# Patient Record
Sex: MALE | Race: AMERICAN INDIAN OR ALASKA NATIVE
[De-identification: names, ages, dates, MRNs, and addresses within clinical notes are randomized per-mention and may not be internally consistent; named-entity substitution may affect disease eponyms.]

---

## 2017-04-10 ENCOUNTER — HOSPITAL ENCOUNTER (EMERGENCY)
Dept: HOSPITAL 31 - C.ER | Age: 67
Discharge: HOME | End: 2017-04-10
Payer: COMMERCIAL

## 2017-04-10 VITALS — OXYGEN SATURATION: 100 %

## 2017-04-10 VITALS — BODY MASS INDEX: 25.9 KG/M2

## 2017-04-10 VITALS — SYSTOLIC BLOOD PRESSURE: 148 MMHG | HEART RATE: 76 BPM | RESPIRATION RATE: 18 BRPM | DIASTOLIC BLOOD PRESSURE: 88 MMHG

## 2017-04-10 VITALS — TEMPERATURE: 98.1 F

## 2017-04-10 DIAGNOSIS — R05: Primary | ICD-10-CM

## 2017-04-10 NOTE — C.PDOC
History Of Present Illness


66 year old male presents to the ED with complaints of a dry non-productive 

cough for the past week. Patient states he was seen by his PMD and was told it 

was likely viral. Denies fever, chills, chest pain, SOB, or any other 

complaints at this time.


Time Seen by Provider: 04/10/17 15:27


Chief Complaint (Nursing): Cough, Cold, Congestion


History Per: Patient


History/Exam Limitations: no limitations


Onset/Duration Of Symptoms: Days


Current Symptoms Are (Timing): Still Present


Severity: Mild


Associated Symptoms: denies: Fever, Chills, Chest Pain, Productive Cough





Past Medical History


Reviewed: Historical Data, Nursing Documentation, Vital Signs


Vital Signs: 


 Last Vital Signs











Temp  98.1 F   04/10/17 13:34


 


Pulse  76   04/10/17 15:58


 


Resp  18   04/10/17 15:58


 


BP  148/88   04/10/17 15:58


 


Pulse Ox  99   04/10/17 15:58














- Medical History


PMH: Benign Prostatic Hyperplasia, Cardia Arrhythmia (Ventricular fibrillations 

x 2 in the past with acute MI's.), CHF, HTN, Hypercholesterolemia, 

Hyperlipidemia, Kidney Stones, Pneumonia, Chronic Kidney Disease


Surgical History: Coronary Stent (X4)





- [facility] Procedures








CARDIOPULM RESUSCITA NOS (10/14/13)


CONTINUOUS INVASIVE MECHANICAL VENTILATION <96 CONSEC HRS (10/14/13)


CONTROL POSTOP PROST HEM (10/14/13)


CORONAR ARTERIOGR-2 CATH (10/14/13)


DESTRUCTION OF PROSTATE, ENDO (02/21/16)


DILATION OF BLADDER WITH INTRALUMINAL DEVICE, ENDO (02/21/16)


DILATION OF L KIDNEY PELVIS WITH INTRALUM DEV, PERC APPROACH (02/21/16)


DIR ING HERNIA REP-GRAFT (07/19/01)


DRAINAGE OF BLADDER WITH DRAINAGE DEVICE, OPEN APPROACH (02/21/16)


ESOPHAGOGASTRODUODENOSCOPY [EGD] W/CLOSED BIOPSY (01/23/02)


EXC LES SOFT TISSUE NEC (07/19/01)


EXTIRPATION OF MATTER FROM BLADDER, ENDO (02/21/16)


EXTIRPATION OF MATTER FROM LEFT URETER, ENDO (02/21/16)


FLUOROSCOPY OF LEFT HEART USING LOW OSMOLAR CONTRAST (04/10/16)


INSERT ENDOTRACHEAL TUBE (10/14/13)


INSERTION OF ENDOTRACHEAL AIRWAY INTO TRACHEA, VIA OPENING (02/21/16)


INSERTION OF ONE VASCULAR STENT (10/14/13)


INSPECTION OF LARYNX, ENDO (02/21/16)


INSPECTION OF PERITONEAL CAVITY, OPEN APPROACH (02/21/16)


INSPECTION OF TRACHEOBRONCHIAL TREE, ENDO (02/21/16)


INTRODUCTION OF NUTRITIONAL INTO UP GI, VIA OPENING (02/21/16)


INTRODUCTION OF VASOPRESSOR INTO PERIPH VEIN, PERC APPROACH (02/21/16)


LEFT HEART CARDIAC CATH (10/14/13)


LT HEART ANGIOCARDIOGRAM (10/14/13)


MEASURE OF CARDIAC SAMPL & PRESSURE, L HEART, PERC APPROACH (04/10/16)


PACKED CELL TRANSFUSION (10/14/13)


PERCUTANEOUS TRANSLUMINAL CORONARY ANGIOPLASTY [PTCA] (10/14/13)


PROCEDURE ON SINGLE VESSEL (10/14/13)


REPAIR BLADDER, OPEN APPROACH (02/21/16)


RESPIRATORY VENTILATION, GREATER THAN 96 CONSECUTIVE HOURS (02/21/16)


RETROGR CYSTOURETHROGRAM (10/14/13)


TRANSFUSE NONAUT FROZEN PLASMA IN PERIPH VEIN, PERC (02/21/16)


TRANSFUSE NONAUT RED BLOOD CELLS IN PERIPH VEIN, PERC (02/21/16)


TRANSURETH BLADD BIOPSY (10/14/13)


TU DESTRUC BLADD LES NEC (10/14/13)








Family History: States: Unknown Family Hx





- Social History


Hx Tobacco Use: No


Hx Alcohol Use: No


Hx Substance Use: No





- Immunization History


Hx Tetanus Toxoid Vaccination: No


Hx Influenza Vaccination: Yes


Hx Pneumococcal Vaccination: No





Review Of Systems


Except As Marked, All Systems Reviewed And Found Negative.


Constitutional: Negative for: Fever, Chills


Cardiovascular: Negative for: Chest Pain, Palpitations


Respiratory: Positive for: Cough.  Negative for: Shortness of Breath, Sputum





Physical Exam





- Physical Exam


Appears: Non-toxic, No Acute Distress


Skin: Normal Color, Warm, Dry


Head: Atraumatic, Normacephalic


Eye(s): bilateral: Normal Inspection


Oral Mucosa: Moist


Neck: Supple


Chest: Symmetrical, No Deformity


Cardiovascular: Rhythm Regular


Respiratory: Normal Breath Sounds, No Accessory Muscle Use, No Rales, No Rhonchi

, No Wheezing


Extremity: Normal ROM, No Pedal Edema


Neurological/Psych: Oriented x3, Normal Speech, Normal Cognition





ED Course And Treatment


O2 Sat by Pulse Oximetry: 100 (Room air)


Pulse Ox Interpretation: Normal


Progress Note: Patient treated with Robitussin.





Medical Decision Making


Medical Decision Making: 


dry non-prod cough chronic, clear lungs


otherwise normal exam


defer w/u w informed consent.





Disposition


Doctor Will See Patient In The: Office


Counseled Patient/Family Regarding: Studies Performed, Diagnosis





- Disposition


Referrals: 


Kiley Burnham MD [Staff Provider] - 


Disposition: HOME/ ROUTINE


Disposition Time: 15:45


Condition: GOOD


Additional Instructions: 


continue OTC cold medicines, as directed


Follow-up with Dr. DONIS as needed. 





- Clinical Impression


Clinical Impression: 


 Cough





- Scribe Statement


The provider has reviewed the documentation as recorded by the Scribe


Benitez Carney.





Provider Attestation: 





All medical record entries made by the Scribe were at my direction and 

personally dictated by me. I have reviewed the chart and agree that the record 

accurately reflects my personal performance of the history, physical exam, 

medical decision making, and the department course for this patient. I have 

also personally directed, reviewed, and agree with the discharge instructions 

and disposition.

## 2017-11-09 ENCOUNTER — HOSPITAL ENCOUNTER (INPATIENT)
Dept: HOSPITAL 31 - C.ER | Age: 67
LOS: 4 days | Discharge: HOME | DRG: 812 | End: 2017-11-13
Attending: LEGAL MEDICINE | Admitting: LEGAL MEDICINE
Payer: MEDICARE

## 2017-11-09 VITALS — BODY MASS INDEX: 25.9 KG/M2

## 2017-11-09 DIAGNOSIS — Z87.01: ICD-10-CM

## 2017-11-09 DIAGNOSIS — K29.50: ICD-10-CM

## 2017-11-09 DIAGNOSIS — F17.200: ICD-10-CM

## 2017-11-09 DIAGNOSIS — E11.22: ICD-10-CM

## 2017-11-09 DIAGNOSIS — K59.00: ICD-10-CM

## 2017-11-09 DIAGNOSIS — I25.2: ICD-10-CM

## 2017-11-09 DIAGNOSIS — I25.10: ICD-10-CM

## 2017-11-09 DIAGNOSIS — H40.9: ICD-10-CM

## 2017-11-09 DIAGNOSIS — Z79.899: ICD-10-CM

## 2017-11-09 DIAGNOSIS — N18.2: ICD-10-CM

## 2017-11-09 DIAGNOSIS — D62: Primary | ICD-10-CM

## 2017-11-09 DIAGNOSIS — K92.1: ICD-10-CM

## 2017-11-09 DIAGNOSIS — N32.89: ICD-10-CM

## 2017-11-09 DIAGNOSIS — Z95.5: ICD-10-CM

## 2017-11-09 DIAGNOSIS — I25.5: ICD-10-CM

## 2017-11-09 DIAGNOSIS — I50.9: ICD-10-CM

## 2017-11-09 DIAGNOSIS — Z87.442: ICD-10-CM

## 2017-11-09 DIAGNOSIS — K64.8: ICD-10-CM

## 2017-11-09 DIAGNOSIS — N40.0: ICD-10-CM

## 2017-11-09 DIAGNOSIS — Z87.11: ICD-10-CM

## 2017-11-09 DIAGNOSIS — E78.00: ICD-10-CM

## 2017-11-09 DIAGNOSIS — K44.9: ICD-10-CM

## 2017-11-09 DIAGNOSIS — K21.9: ICD-10-CM

## 2017-11-09 DIAGNOSIS — E78.5: ICD-10-CM

## 2017-11-09 DIAGNOSIS — N17.9: ICD-10-CM

## 2017-11-09 DIAGNOSIS — H91.90: ICD-10-CM

## 2017-11-09 DIAGNOSIS — I49.9: ICD-10-CM

## 2017-11-09 DIAGNOSIS — K57.30: ICD-10-CM

## 2017-11-09 DIAGNOSIS — J44.9: ICD-10-CM

## 2017-11-09 DIAGNOSIS — K29.00: ICD-10-CM

## 2017-11-09 DIAGNOSIS — I13.0: ICD-10-CM

## 2017-11-09 LAB
ALBUMIN/GLOB SERPL: 1.3 {RATIO} (ref 1–2.1)
ALP SERPL-CCNC: 47 U/L (ref 38–126)
ALT SERPL-CCNC: 27 U/L (ref 21–72)
APTT BLD: 31 SECONDS (ref 21–34)
AST SERPL-CCNC: 24 U/L (ref 17–59)
BACTERIA #/AREA URNS HPF: (no result) /[HPF]
BASOPHILS # BLD AUTO: 0.1 K/UL (ref 0–0.2)
BASOPHILS NFR BLD: 1.3 % (ref 0–2)
BILIRUB SERPL-MCNC: 0.6 MG/DL (ref 0.2–1.3)
BILIRUB UR-MCNC: NEGATIVE MG/DL
BUN SERPL-MCNC: 19 MG/DL (ref 9–20)
CALCIUM SERPL-MCNC: 8.2 MG/DL (ref 8.6–10.4)
CHLORIDE SERPL-SCNC: 104 MMOL/L (ref 98–107)
CO2 SERPL-SCNC: 22 MMOL/L (ref 22–30)
EOSINOPHIL # BLD AUTO: 0.1 K/UL (ref 0–0.7)
EOSINOPHIL NFR BLD: 1.1 % (ref 0–4)
ERYTHROCYTE [DISTWIDTH] IN BLOOD BY AUTOMATED COUNT: 19.6 % (ref 11.5–14.5)
GLOBULIN SER-MCNC: 2.8 GM/DL (ref 2.2–3.9)
GLUCOSE SERPL-MCNC: 101 MG/DL (ref 75–110)
GLUCOSE UR STRIP-MCNC: NORMAL MG/DL
HCT VFR BLD CALC: 17.2 % (ref 35–51)
INR PPP: 1.1
KETONES UR STRIP-MCNC: NEGATIVE MG/DL
LEUKOCYTE ESTERASE UR-ACNC: (no result) LEU/UL
LYMPHOCYTES # BLD AUTO: 0.9 K/UL (ref 1–4.3)
LYMPHOCYTES NFR BLD AUTO: 16.4 % (ref 20–40)
MCH RBC QN AUTO: 17.2 PG (ref 27–31)
MCHC RBC AUTO-ENTMCNC: 28.6 G/DL (ref 33–37)
MCV RBC AUTO: 60.3 FL (ref 80–94)
MONOCYTES # BLD: 0.7 K/UL (ref 0–0.8)
MONOCYTES NFR BLD: 12.2 % (ref 0–10)
NRBC BLD AUTO-RTO: 0.2 % (ref 0–2)
PH UR STRIP: 8 [PH] (ref 5–8)
PLATELET # BLD: 284 K/UL (ref 130–400)
PMV BLD AUTO: 8.5 FL (ref 7.2–11.7)
POTASSIUM SERPL-SCNC: 4.3 MMOL/L (ref 3.6–5.2)
PROT SERPL-MCNC: 6.5 G/DL (ref 6.3–8.3)
PROT UR STRIP-MCNC: (no result) MG/DL
RBC # UR STRIP: (no result) /UL
RBC #/AREA URNS HPF: 2 /HPF (ref 0–3)
SODIUM SERPL-SCNC: 136 MMOL/L (ref 132–148)
SP GR UR STRIP: 1.01 (ref 1–1.03)
TRI-PHOS CRY #/AREA URNS HPF: (no result) /HPF
UROBILINOGEN UR-MCNC: NORMAL MG/DL (ref 0.2–1)
WBC # BLD AUTO: 5.6 K/UL (ref 4.8–10.8)
WBC #/AREA URNS HPF: 62 /HPF (ref 0–5)

## 2017-11-09 PROCEDURE — 30233N1 TRANSFUSION OF NONAUTOLOGOUS RED BLOOD CELLS INTO PERIPHERAL VEIN, PERCUTANEOUS APPROACH: ICD-10-PCS | Performed by: LEGAL MEDICINE

## 2017-11-09 RX ADMIN — PANTOPRAZOLE SODIUM SCH MLS/HR: 40 INJECTION, POWDER, FOR SOLUTION INTRAVENOUS at 21:32

## 2017-11-09 RX ADMIN — Medication SCH: at 19:53

## 2017-11-09 NOTE — C.PDOC
History Of Present Illness


65 y/o male presents to ED sent by PMD for low hemoglobin. Patient states he 

had epigastric abdominal pain and went to see Dr. Burnham where lab work was 

done and showed a hemoglobin of 5. PMD advised patient come to ED for further 

evaluation.Patient reports he has been constipated and denies fever, chills, 

dizziness, nausea or any other complaints at this time.    


Time Seen by Provider: 17 10:47


Chief Complaint (Nursing): Abdominal Pain


History Per: Patient


History/Exam Limitations: no limitations


Onset/Duration Of Symptoms: Days


Current Symptoms Are (Timing): Still Present


Location Of Pain/Discomfort: Epigastric





Past Medical History


Reviewed: Historical Data, Nursing Documentation, Vital Signs


Vital Signs: 


 Last Vital Signs











Temp  98.7 F   17 13:14


 


Pulse  79   17 13:14


 


Resp  15   17 13:14


 


BP  117/66   17 13:14


 


Pulse Ox  100   17 13:37














- Medical History


PMH: Benign Prostatic Hyperplasia, Cardia Arrhythmia (Ventricular fibrillations 

x 2 in the past with acute MI's.), CHF, HTN, Hypercholesterolemia, 

Hyperlipidemia, Kidney Stones, Pneumonia, Chronic Kidney Disease


Surgical History: Coronary Stent (X4)





- [facility] Procedures








CARDIOPULM RESUSCITA NOS (10/14/13)


CONTINUOUS INVASIVE MECHANICAL VENTILATION <96 CONSEC HRS (10/14/13)


CONTROL POSTOP PROST HEM (10/14/13)


CORONAR ARTERIOGR-2 CATH (10/14/13)


DESTRUCTION OF PROSTATE, ENDO (16)


DILATION OF BLADDER WITH INTRALUMINAL DEVICE, ENDO (16)


DILATION OF L KIDNEY PELVIS WITH INTRALUM DEV, PERC APPROACH (16)


DIR ING HERNIA REP-GRAFT (01)


DRAINAGE OF BLADDER WITH DRAINAGE DEVICE, OPEN APPROACH (16)


ESOPHAGOGASTRODUODENOSCOPY [EGD] W/CLOSED BIOPSY (02)


EXC LES SOFT TISSUE NEC (01)


EXTIRPATION OF MATTER FROM BLADDER, ENDO (16)


EXTIRPATION OF MATTER FROM LEFT URETER, ENDO (16)


FLUOROSCOPY OF LEFT HEART USING LOW OSMOLAR CONTRAST (04/10/16)


INSERT ENDOTRACHEAL TUBE (10/14/13)


INSERTION OF ENDOTRACHEAL AIRWAY INTO TRACHEA, VIA OPENING (16)


INSERTION OF ONE VASCULAR STENT (10/14/13)


INSPECTION OF LARYNX, ENDO (16)


INSPECTION OF PERITONEAL CAVITY, OPEN APPROACH (16)


INSPECTION OF TRACHEOBRONCHIAL TREE, ENDO (16)


INTRODUCTION OF NUTRITIONAL INTO UP GI, VIA OPENING (16)


INTRODUCTION OF VASOPRESSOR INTO PERIPH VEIN, PERC APPROACH (16)


LEFT HEART CARDIAC CATH (10/14/13)


LT HEART ANGIOCARDIOGRAM (10/14/13)


MEASURE OF CARDIAC SAMPL & PRESSURE, L HEART, PERC APPROACH (04/10/16)


PACKED CELL TRANSFUSION (10/14/13)


PERCUTANEOUS TRANSLUMINAL CORONARY ANGIOPLASTY [PTCA] (10/14/13)


PROCEDURE ON SINGLE VESSEL (10/14/13)


REPAIR BLADDER, OPEN APPROACH (16)


RESPIRATORY VENTILATION, GREATER THAN 96 CONSECUTIVE HOURS (16)


RETROGR CYSTOURETHROGRAM (10/14/13)


TRANSFUSE NONAUT FROZEN PLASMA IN PERIPH VEIN, PERC (16)


TRANSFUSE NONAUT RED BLOOD CELLS IN PERIPH VEIN, PERC (16)


TRANSURETH BLADD BIOPSY (10/14/13)


TU DESTRUC BLADD LES NEC (10/14/13)








Family History: States: No Known Family Hx





- Social History


Hx Tobacco Use: No


Hx Alcohol Use: No


Hx Substance Use: No





- Immunization History


Hx Tetanus Toxoid Vaccination: No


Hx Influenza Vaccination: No


Hx Pneumococcal Vaccination: No





Review Of Systems


Constitutional: Negative for: Fever, Chills


Gastrointestinal: Positive for: Abdominal Pain.  Negative for: Nausea, Vomiting


Skin: Negative for: Rash


Neurological: Negative for: Weakness, Numbness, Dizziness





Physical Exam





- Physical Exam


Appears: Non-toxic, No Acute Distress


Skin: Normal Color, Warm, Dry, No Rash


Head: Atraumatic, Normacephalic


Eye(s): bilateral: Normal Inspection


Oral Mucosa: Moist


Neck: Normal ROM, Supple


Cardiovascular: Rhythm Regular


Respiratory: Normal Breath Sounds, No Rales, No Rhonchi, No Wheezing


Gastrointestinal/Abdominal: Soft, Tenderness (Mild periumbilical ), No Guarding

, No Rebound


Rectal: Heme Negative (Stool brown, trace positive with guiac)


Back: No CVA Tenderness


Neurological/Psych: Oriented x3





ED Course And Treatment





- Laboratory Results


Result Diagrams: 


 17 11:25





 17 11:25


Lab Interpretation: Abnormal


ECG: Interpreted By Me


ECG Rhythm: Sinus Rhythm, Nonspecific Changes


ECG Interpretation: No Acute Changes


Rate From EC


O2 Sat by Pulse Oximetry: 100 (RA)


Pulse Ox Interpretation: Normal





- Other Rad


  ** No standard instances


X-Ray: Viewed By Me, Read By Radiologist


Interpretation: Findings:  Mild venous congestion.  Calcification at the aortic 

knob.  Tortuous aorta.  Mild cardiomegaly.  Degenerative changes in the spine 

and shoulders.  Few mildly distended loops of small bowel in the right and left 

vinny abdomen.  Mild fecal retention the colon.  Degenerative changes in the 

spine and bilateral iliac crests and hips.  Calcified phleboliths in pelvis.  

Impression:  Nonspecific bowel gas pattern with a few distended loops of bowel 

in the right and left mid abdomen. Correlation with CT scan may be helpful if 

abdominal pain persists. Clinical correlation.


Progress Note: Treated with IVF NSS @ 100 ml/hr.  2 units PC ordered.  Case 

discussed with Dr Aggarwal who request admission to Dr Dodge.  Admitted to 

telemetry in stable condition


Reassessment Condition: Unchanged





- Physician Consult Information


Physician Contacted: Kiley Burnham


Outcome Of Conversation: admit





Medical Decision Making


Medical Decision Making: 


Patient is on Brilinta for irregular heart rate, on blood thinners





Plan: Abdominal Xray, IV fluids, ECG, Blood work, UA 





Disposition


Discussed With Dr.: Kiley Burnham


Doctor Will See Patient In The: Hospital





- Disposition


Disposition: HOSPITALIZED


Disposition Time: 12:10


Condition: STABLE





- POA


Present On Arrival: None





- Clinical Impression


Clinical Impression: 


 Anemia, GI bleeding








- PA / NP / Resident Statement


MD/DO has reviewed & agrees with the documentation as recorded.





- Scribe Statement


The provider has reviewed the documentation as recorded by the Scribe


Zeeshan Blanc





All medical record entries made by the Rhiannaibbre were at my direction and 

personally dictated by me. I have reviewed the chart and agree that the record 

accurately reflects my personal performance of the history, physical exam, 

medical decision making, and the department course for this patient. I have 

also personally directed, reviewed, and agree with the discharge instructions 

and disposition.





Decision To Admit





- Pt Status Changed To:


Hospital Disposition Of: Inpatient





- Admit Certification


Admit to Inpatient:: After my assessment, the patient will require 

hospitalization for at least two midnights.  This is because of the severity of 

symptoms shown, intensity of services needed, and/or the medical risk in this 

patient being treated as an outpatient.





- InPatient:


Physician Admission Certification:: Anemia.  GI Bleeding





- .


Bed Request Type: Telemetry


Admitting Physician: Kiley Burnham


Patient Diagnosis: 


 Anemia, GI bleeding

## 2017-11-09 NOTE — RAD
Abdomen four views 



History: Abdominal pain. 



Comparison: None available. 



Findings: 



Mild venous congestion. 



Calcification at the aortic knob. 



Tortuous aorta. 



Mild cardiomegaly. 



Degenerative changes in the spine and shoulders. 



Few mildly distended loops of small bowel in the right and left vinny 

abdomen. 



Mild fecal retention the colon. 



Degenerative changes in the spine and bilateral iliac crests and 

hips. 



Calcified phleboliths in pelvis. 



Impression: 



Nonspecific bowel gas pattern with a few distended loops of bowel in 

the right and left mid abdomen. Correlation with CT scan may be 

helpful if abdominal pain persists. Clinical correlation.

## 2017-11-09 NOTE — CP.PCM.CON
History of Present Illness





- History of Present Illness


History of Present Illness: 





65 yo male complaining of vague abdominal discomfort, and nausea for 2 weeks. A 

CBC done in my office yesterday revealed a hgb: 5.0. Patient was advised 

hospitalization. Patient is known to have a CAD, s/p PCI of the RCA, an 

ischemic cardiomyopathy with a mild LV systolic dysfunction ( LVEF= 40%, mild MR

, TR on recent echocardiogram.), urinary bladder stones, a peptic ulcer 10 

years ago, on Pepcid, Brilinta, ASA, Crestor, Coreg, Losartan, he denies any 

loss of weight, but admits to have black, tarry stools for the past few days. 

He is receiving PRC transfusions. Brilinta and ASA are on hold, and the patient 

is scheduled for an EGD on Saturday.





Review of Systems





- Constitutional


Constitutional: Weakness





- Cardiovascular


Cardiovascular: Lightheadedness





- Gastrointestinal


Gastrointestinal: Melena, Nausea, Vomiting





Past Patient History





- Infectious Disease


Hx of Infectious Diseases: None





- Tetanus Immunizations


Tetanus Immunization: Unknown





- Past Medical History & Family History


Past Medical History?: Yes





- Past Social History


Smoking Status: Smoked extensively [place] quit after stent placement


Occupation: worked in [employer]


Alcohol: None


Drugs: Denies


Home Situation {Lives}: With Family





- CARDIAC


Hx Cardiac Disorders: Yes


Hx Cardia Arrhythmia: Yes (Ventricular fibrillations x 2 in the past with acute 

MI's.)


Hx Congestive Heart Failure: Yes


Hx Heart Attack: Yes


Hx Hypercholesterolemia: Yes


Hx Hypertension: Yes





- PULMONARY


Hx Respiratory Disorders: No


Hx Pneumonia: Yes





- NEUROLOGICAL


Hx Neurological Disorder: No





- HEENT


Hx HEENT Problems: Yes


Hx Deafness: Yes (HEARING AIDS)


Hx Glaucoma: Yes





- RENAL


Hx Chronic Kidney Disease: Yes


Hx Kidney Stones: Yes


Other/Comment: Perforated bladder 2015





- ENDOCRINE/METABOLIC


Hx Endocrine Disorders: Yes (Pre diabetes/no medication)


Other/Comment: "borderline diabetes"





- HEMATOLOGICAL/ONCOLOGICAL


Hx Blood Disorders: Yes


Hx Blood Transfusions: Yes


Hx Cirrhosis: No


Hx Hepatitis A: No


Hx Hepatitis B: No


Hx Hepatitis C: No


Hx Human Immunodeficiency Virus (HIV): No





- INTEGUMENTARY


Hx Dermatological Problems: No





- MUSCULOSKELETAL/RHEUMATOLOGICAL


Hx Musculoskeletal Disorders: Yes


Hx Back Pain: Yes


Hx Falls: No


Hx Herniated Disk: Yes ("LOWER BACK")





- GASTROINTESTINAL


Hx Gastrointestinal Disorders: Yes


Hx Bowel Surgery: No


Hx Clostridium Difficile: No


Hx Colitis: No


Hx Colostomy: No


Hx Constipation: Yes


Hx Crohn's Disease: No


Hx Diarrhea: No


Hx Diverticulitis: No


Hx Esophageal Varices: No


Hx Fatty Liver Disease: No


Hx Gall Bladder Disease: No


Hx Gastritis: Yes


Hx Gastroesophageal Reflux: Yes


Hx Hemorrhoids: No


Hx Ileostomy: No


Hx Irritable Bowel: No


Hx Liver Failure: No


Hx Nausea: No


Hx Pancreatitis: No


HX Swallowing Problems: No


Hx Ulcer: Yes (2008 with H pylori treated)





- GENITOURINARY/GYNECOLOGICAL


Hx Genitourinary Disorders: Yes


Hx Prostate Problems: Yes





- PSYCHIATRIC


Hx Psychophysiologic Disorder: No


Hx Substance Use: No





- SURGICAL HISTORY


Hx Surgeries: Yes


Hx Coronary Stent: Yes (X4)


Other/Comment: Bladder/Prostate surgery and perforation repair





- ANESTHESIA


Hx Anesthesia: Yes


Hx Anesthesia Reactions: No (? HAD MI 2013 AFTER PROSTATE SURGERY-PT ALSO HAD 

BLEEDING)


Hx Malignant Hyperthermia: No





Meds


Allergies/Adverse Reactions: 


 Allergies











Allergy/AdvReac Type Severity Reaction Status Date / Time


 


Penicillins Allergy Unknown HAPPENED Verified 11/09/17 10:10





   AS A  





   CHILD-UNKNOWN  





   REACTION  














- Medications


Medications: 


 Current Medications





Acetaminophen (Tylenol 325mg Tab)  650 mg PO Q6 PRN


   PRN Reason: transfusion


   Last Admin: 11/09/17 16:55 Dose:  650 mg


Carvedilol (Coreg)  6.25 mg PO BID [facility]


   Last Admin: 11/09/17 19:52 Dose:  Not Given


Furosemide (Lasix)  20 mg IVP DAILY [facility]


Pantoprazole Sodium 80 mg/ (Dextrose)  100 mls @ 10 mls/hr IVPB .Q10H [facility]


   PRN Reason: 8 MG/HR


   Last Admin: 11/09/17 21:32 Dose:  10 mls/hr


Losartan Potassium (Cozaar)  100 mg PO DAILY [facility]


   Last Admin: 11/09/17 19:52 Dose:  Not Given


Multivitamins (Hexavitamin)  1 tab PO DAILY [facility]


   Last Admin: 11/09/17 19:53 Dose:  Not Given


Rosuvastatin Calcium (Crestor)  10 mg PO HS [facility]


   Last Admin: 11/09/17 21:22 Dose:  Not Given


Tamsulosin HCl (Flomax)  0.4 mg PO DAILY [facility]


   Last Admin: 11/09/17 19:53 Dose:  Not Given











Physical Exam





- Constitutional


Appears: No Acute Distress





- Head Exam


Head Exam: NORMAL INSPECTION





- Eye Exam


Eye Exam: Normal appearance





- ENT Exam


ENT Exam: Normal Exam





- Neck Exam


Neck exam: Positive for: Normal Inspection





- Respiratory Exam


Respiratory Exam: Clear to Auscultation Bilateral, NORMAL BREATHING PATTERN





- Cardiovascular Exam


Cardiovascular Exam: REGULAR RHYTHM





- GI/Abdominal Exam


GI & Abdominal Exam: Normal Bowel Sounds, Soft





- Rectal Exam


Rectal Exam: Black Stool





- Extremities Exam


Extremities exam: Positive for: normal inspection





- Back Exam


Back exam: NORMAL INSPECTION





- Neurological Exam


Neurological exam: Alert, Oriented x3





- Psychiatric Exam


Psychiatric exam: Anxious





- Skin


Skin Exam: Dry, Intact





Results





- Vital Signs


Recent Vital Signs: 


 Last Vital Signs











Temp  98.4 F   11/09/17 23:18


 


Pulse  73   11/09/17 23:18


 


Resp  20   11/09/17 21:15


 


BP  125/68   11/09/17 21:22


 


Pulse Ox  98   11/09/17 16:00














- Labs


Result Diagrams: 


 11/09/17 11:25





 11/09/17 11:25


Labs: 


 Laboratory Results - last 24 hr











  11/09/17 11/09/17 11/09/17





  11:25 11:25 11:25


 


WBC  5.6  


 


RBC  2.85 L  


 


Hgb  4.9 L* D  


 


Hct  17.2 L  


 


MCV  60.3 L D  


 


MCH  17.2 L  


 


MCHC  28.6 L  


 


RDW  19.6 H  


 


Plt Count  284  D  


 


MPV  8.5  


 


Neut % (Auto)  69.0  


 


Lymph % (Auto)  16.4 L  


 


Mono % (Auto)  12.2 H  


 


Eos % (Auto)  1.1  


 


Baso % (Auto)  1.3  


 


Neut #  3.8  


 


Lymph #  0.9 L  


 


Mono #  0.7  


 


Eos #  0.1  


 


Baso #  0.1  


 


Smear Path Review    


 


PT   12.9 H 


 


INR   1.1 


 


APTT   31 


 


Sodium   


 


Potassium   


 


Chloride   


 


Carbon Dioxide   


 


Anion Gap   


 


BUN   


 


Creatinine   


 


Est GFR ( Amer)   


 


Est GFR (Non-Af Amer)   


 


Random Glucose   


 


Calcium   


 


Total Bilirubin   


 


AST   


 


ALT   


 


Alkaline Phosphatase   


 


Total Protein   


 


Albumin   


 


Globulin   


 


Albumin/Globulin Ratio   


 


Lipase   


 


Urine Color    Yellow


 


Urine Clarity    Hazy


 


Urine pH    8.0


 


Ur Specific Gravity    1.014


 


Urine Protein    1+ H


 


Urine Glucose (UA)    Normal


 


Urine Ketones    Negative


 


Urine Blood    1+ H


 


Urine Nitrate    Negative


 


Urine Bilirubin    Negative


 


Urine Urobilinogen    Normal


 


Ur Leukocyte Esterase    3+ H


 


Urine WBC (Auto)    62 H


 


Urine RBC (Auto)    2


 


Ur Squamous Epith Cells    2


 


Triple Phos Crystals    Occ H


 


Urine Bacteria    Rare


 


Blood Type   


 


Antibody Screen   














  11/09/17 11/09/17





  11:25 11:25


 


WBC  


 


RBC  


 


Hgb  


 


Hct  


 


MCV  


 


MCH  


 


MCHC  


 


RDW  


 


Plt Count  


 


MPV  


 


Neut % (Auto)  


 


Lymph % (Auto)  


 


Mono % (Auto)  


 


Eos % (Auto)  


 


Baso % (Auto)  


 


Neut #  


 


Lymph #  


 


Mono #  


 


Eos #  


 


Baso #  


 


Smear Path Review  


 


PT  


 


INR  


 


APTT  


 


Sodium  136 


 


Potassium  4.3 


 


Chloride  104 


 


Carbon Dioxide  22 


 


Anion Gap  15 


 


BUN  19 


 


Creatinine  1.5 


 


Est GFR ( Amer)  57 


 


Est GFR (Non-Af Amer)  47 


 


Random Glucose  101 


 


Calcium  8.2 L 


 


Total Bilirubin  0.6 


 


AST  24 


 


ALT  27 


 


Alkaline Phosphatase  47 


 


Total Protein  6.5 


 


Albumin  3.7 


 


Globulin  2.8 


 


Albumin/Globulin Ratio  1.3 


 


Lipase  120 


 


Urine Color  


 


Urine Clarity  


 


Urine pH  


 


Ur Specific Gravity  


 


Urine Protein  


 


Urine Glucose (UA)  


 


Urine Ketones  


 


Urine Blood  


 


Urine Nitrate  


 


Urine Bilirubin  


 


Urine Urobilinogen  


 


Ur Leukocyte Esterase  


 


Urine WBC (Auto)  


 


Urine RBC (Auto)  


 


Ur Squamous Epith Cells  


 


Triple Phos Crystals  


 


Urine Bacteria  


 


Blood Type   A POSITIVE


 


Antibody Screen   Negative














Assessment & Plan


(1) Severe anemia


Status: Acute   





(2) GI bleeding


Assessment and Plan: 


Hold Brilinta and ASA. Start Protonix as per Dr Price.


Status: Acute   





(3) Coronary artery disease


Status: Chronic

## 2017-11-09 NOTE — CP.PCM.CON
History of Present Illness





- History of Present Illness


History of Present Illness: 





66 /yo male with pmx of HTN/CAD/ho stent restenosis presents to [facility] 

with weakness, dizziness and black stools x2 days. Patient has history of anemia

, GI source. Pateint has no complaints of chest pain, denies dizziness, denies 

any visual disturbance. Patient notes has last bM was 2 days ago. no dysuria. 

PAtient's GI and primary physicain requested ICU monitoring with h/o stent 

restenosis and Arrythmia.





Review of Systems





- Constitutional


Constitutional: Weakness





- Cardiovascular


Cardiovascular: Other


Additional comments: 





Arrythmia





- Gastrointestinal


Gastrointestinal: Change in Bowel Habits, Constipation, Melena





- Genitourinary


Genitourinary: Other.  absent: As Per HPI, Change in Urinary Stream, Difficulty 

Urinating, Dysuria, Flank Pain, Hematuria, Pyuria, Nocturia, Urinary 

Incontinence, Urinary Frequency, Urinary Hesitance, Urinary Urgency, Voiding 

Freq/Small Amts, Freq UTI, Hx Renal/Bladder Calculi, Hx /Renal Surgery, 

Bladder Distension


Additional comments: 


patient denies





- Neurological


Neurological: absent: As Per HPI, Abnormal Gait, Abnormal Hearing, Abnormal 

Movements, Abnormal Speech, Behavioral Changes, Burning Sensations, Confusion, 

Convulsions, Disequilibrium, Dizziness, Numbness, Focal Weakness, Frequent Falls

, Headaches, Lack of Coordination, Loss of Vision, Memory Loss, Paresthesias, 

Radicular Pain, Restless Legs, Sensory Deficit, Syncope, Tingling, Tremor, 

Vertigo, Weakness, Other Visual Disturbances, Other





- Hematologic/Lymphatic


Hematologic: As Per HPI, Other (ON antipletelets)





Past Patient History





- Infectious Disease


Hx of Infectious Diseases: None





- Tetanus Immunizations


Tetanus Immunization: Unknown





- Past Medical History & Family History


Past Medical History?: Yes





- Past Social History


Smoking Status: Smoked extensively [place] quit after stent placement


Occupation: worked in [employer]


Alcohol: None


Drugs: Denies


Home Situation {Lives}: With Family





- CARDIAC


Hx Cardiac Disorders: Yes


Hx Cardia Arrhythmia: Yes (Ventricular fibrillations x 2 in the past with acute 

MI's.)


Hx Congestive Heart Failure: Yes


Hx Heart Attack: Yes


Hx Hypercholesterolemia: Yes


Hx Hypertension: Yes





- PULMONARY


Hx Respiratory Disorders: No


Hx Pneumonia: Yes





- NEUROLOGICAL


Hx Neurological Disorder: No





- HEENT


Hx HEENT Problems: Yes


Hx Deafness: Yes (HEARING AIDS)


Hx Glaucoma: Yes





- RENAL


Hx Chronic Kidney Disease: Yes


Hx Kidney Stones: Yes


Other/Comment: Perforated bladder 2015





- ENDOCRINE/METABOLIC


Hx Endocrine Disorders: Yes (Pre diabetes/no medication)


Other/Comment: "borderline diabetes"





- HEMATOLOGICAL/ONCOLOGICAL


Hx Blood Disorders: Yes


Hx Blood Transfusions: Yes


Hx Cirrhosis: No


Hx Hepatitis A: No


Hx Hepatitis B: No


Hx Hepatitis C: No


Hx Human Immunodeficiency Virus (HIV): No





- INTEGUMENTARY


Hx Dermatological Problems: No





- MUSCULOSKELETAL/RHEUMATOLOGICAL


Hx Musculoskeletal Disorders: Yes


Hx Back Pain: Yes


Hx Falls: No


Hx Herniated Disk: Yes ("LOWER BACK")





- GASTROINTESTINAL


Hx Gastrointestinal Disorders: Yes


Hx Bowel Surgery: No


Hx Clostridium Difficile: No


Hx Colitis: No


Hx Colostomy: No


Hx Constipation: Yes


Hx Crohn's Disease: No


Hx Diarrhea: No


Hx Diverticulitis: No


Hx Esophageal Varices: No


Hx Fatty Liver Disease: No


Hx Gall Bladder Disease: No


Hx Gastritis: Yes


Hx Gastroesophageal Reflux: Yes


Hx Hemorrhoids: No


Hx Ileostomy: No


Hx Irritable Bowel: No


Hx Liver Failure: No


Hx Nausea: No


Hx Pancreatitis: No


HX Swallowing Problems: No


Hx Ulcer: Yes (2008 with H pylori treated)





- GENITOURINARY/GYNECOLOGICAL


Hx Genitourinary Disorders: Yes


Hx Prostate Problems: Yes





- PSYCHIATRIC


Hx Psychophysiologic Disorder: No


Hx Substance Use: No





- SURGICAL HISTORY


Hx Surgeries: Yes


Hx Coronary Stent: Yes (X4)


Other/Comment: Bladder/Prostate surgery and perforation repair





- ANESTHESIA


Hx Anesthesia: Yes


Hx Anesthesia Reactions: No (? HAD MI 2013 AFTER PROSTATE SURGERY-PT ALSO HAD 

BLEEDING)


Hx Malignant Hyperthermia: No





Meds


Allergies/Adverse Reactions: 


 Allergies











Allergy/AdvReac Type Severity Reaction Status Date / Time


 


Penicillins Allergy Unknown HAPPENED Verified 11/09/17 10:10





   AS A  





   CHILD-UNKNOWN  





   REACTION  














- Medications


Medications: 


 Current Medications





Acetaminophen (Tylenol 325mg Tab)  650 mg PO Q6 PRN


   PRN Reason: transfusion


   Last Admin: 11/09/17 16:55 Dose:  650 mg


Carvedilol (Coreg)  6.25 mg PO BID GRETCHEN


Furosemide (Lasix)  20 mg IVP DAILY GRETCH[facility]


Sodium Chloride (Sodium Chloride 0.9%)  1,000 mls @ 100 mls/hr IV .Q10H GRETCHEN


   Last Admin: 11/09/17 12:06 Dose:  100 mls/hr


Losartan Potassium (Cozaar)  100 mg PO DAILY [facility]


Multivitamins (Hexavitamin)  1 tab PO DAILY [facility]


Pantoprazole Sodium (Protonix Inj)  40 mg IVP Q12H GRETCHEN


   Last Admin: 11/09/17 17:27 Dose:  40 mg


Rosuvastatin Calcium (Crestor)  10 mg PO HS [facility]


Tamsulosin HCl (Flomax)  0.4 mg PO DAILY [facility]











Physical Exam





- Constitutional


Appears: No Acute Distress





- Head Exam


Head Exam: ATRAUMATIC, NORMAL INSPECTION





- Eye Exam


Eye Exam: Normal appearance


Pupil Exam: NORMAL ACCOMODATION





- ENT Exam


ENT Exam: Normal Exam





- Respiratory Exam


Respiratory Exam: NORMAL BREATHING PATTERN





- Cardiovascular Exam


Cardiovascular Exam: REGULAR RHYTHM, +S1, +S2, Systolic Murmur





- GI/Abdominal Exam


GI & Abdominal Exam: Soft





- Rectal Exam


Additional comments: 





Black stool as per history





- Extremities Exam


Extremities exam: Positive for: normal inspection





- Skin


Skin Exam: Normal Color





Results





- Vital Signs


Recent Vital Signs: 


 Last Vital Signs











Temp  98.4 F   11/09/17 18:13


 


Pulse  75   11/09/17 18:13


 


Resp  20   11/09/17 18:13


 


BP  128/73   11/09/17 18:13


 


Pulse Ox  98   11/09/17 16:00














- Labs


Result Diagrams: 


 11/09/17 11:25





 11/09/17 11:25


Labs: 


 Laboratory Results - last 24 hr











  11/09/17 11/09/17 11/09/17





  11:25 11:25 11:25


 


WBC  5.6  


 


RBC  2.85 L  


 


Hgb  4.9 L* D  


 


Hct  17.2 L  


 


MCV  60.3 L D  


 


MCH  17.2 L  


 


MCHC  28.6 L  


 


RDW  19.6 H  


 


Plt Count  284  D  


 


MPV  8.5  


 


Neut % (Auto)  69.0  


 


Lymph % (Auto)  16.4 L  


 


Mono % (Auto)  12.2 H  


 


Eos % (Auto)  1.1  


 


Baso % (Auto)  1.3  


 


Neut #  3.8  


 


Lymph #  0.9 L  


 


Mono #  0.7  


 


Eos #  0.1  


 


Baso #  0.1  


 


Smear Path Review    


 


PT   12.9 H 


 


INR   1.1 


 


APTT   31 


 


Sodium   


 


Potassium   


 


Chloride   


 


Carbon Dioxide   


 


Anion Gap   


 


BUN   


 


Creatinine   


 


Est GFR ( Amer)   


 


Est GFR (Non-Af Amer)   


 


Random Glucose   


 


Calcium   


 


Total Bilirubin   


 


AST   


 


ALT   


 


Alkaline Phosphatase   


 


Total Protein   


 


Albumin   


 


Globulin   


 


Albumin/Globulin Ratio   


 


Lipase   


 


Urine Color    Yellow


 


Urine Clarity    Hazy


 


Urine pH    8.0


 


Ur Specific Gravity    1.014


 


Urine Protein    1+ H


 


Urine Glucose (UA)    Normal


 


Urine Ketones    Negative


 


Urine Blood    1+ H


 


Urine Nitrate    Negative


 


Urine Bilirubin    Negative


 


Urine Urobilinogen    Normal


 


Ur Leukocyte Esterase    3+ H


 


Urine WBC (Auto)    62 H


 


Urine RBC (Auto)    2


 


Ur Squamous Epith Cells    2


 


Triple Phos Crystals    Occ H


 


Urine Bacteria    Rare


 


Blood Type   


 


Antibody Screen   














  11/09/17 11/09/17





  11:25 11:25


 


WBC  


 


RBC  


 


Hgb  


 


Hct  


 


MCV  


 


MCH  


 


MCHC  


 


RDW  


 


Plt Count  


 


MPV  


 


Neut % (Auto)  


 


Lymph % (Auto)  


 


Mono % (Auto)  


 


Eos % (Auto)  


 


Baso % (Auto)  


 


Neut #  


 


Lymph #  


 


Mono #  


 


Eos #  


 


Baso #  


 


Smear Path Review  


 


PT  


 


INR  


 


APTT  


 


Sodium  136 


 


Potassium  4.3 


 


Chloride  104 


 


Carbon Dioxide  22 


 


Anion Gap  15 


 


BUN  19 


 


Creatinine  1.5 


 


Est GFR ( Amer)  57 


 


Est GFR (Non-Af Amer)  47 


 


Random Glucose  101 


 


Calcium  8.2 L 


 


Total Bilirubin  0.6 


 


AST  24 


 


ALT  27 


 


Alkaline Phosphatase  47 


 


Total Protein  6.5 


 


Albumin  3.7 


 


Globulin  2.8 


 


Albumin/Globulin Ratio  1.3 


 


Lipase  120 


 


Urine Color  


 


Urine Clarity  


 


Urine pH  


 


Ur Specific Gravity  


 


Urine Protein  


 


Urine Glucose (UA)  


 


Urine Ketones  


 


Urine Blood  


 


Urine Nitrate  


 


Urine Bilirubin  


 


Urine Urobilinogen  


 


Ur Leukocyte Esterase  


 


Urine WBC (Auto)  


 


Urine RBC (Auto)  


 


Ur Squamous Epith Cells  


 


Triple Phos Crystals  


 


Urine Bacteria  


 


Blood Type   A POSITIVE


 


Antibody Screen   Negative














Assessment & Plan


(1) Anemia associated with acute blood loss


Assessment and Plan: 


acute on chronic bloos loss anemia: continue blood transfusion to keep hb/hct >9

/24


-continue ppi


-GI endoscopy for urgent EGD





Status: Acute   Priority: High   Onset Date: ~11/07/17   





(2) CAD (coronary artery disease)


Assessment and Plan: 


Avoid antiplatelets


-continue av saqib blocker


-monitor hb/hct


-keep MAP >65


-check pro-bnp








Status: Chronic   Priority: High   





(3) COPD (chronic obstructive pulmonary disease)


Assessment and Plan: 


Patient strongly advised to stop smoking


no wheezing


PRN duonebs


Status: Suspected   Priority: Medium   





(4) CKD (chronic kidney disease) stage 2, GFR 60-89 ml/min


Assessment and Plan: 


creatine slightly high, avoid neprhjotosicx drugs


-hold Acei


-continue to monitor


-bladder US at abedside


-continue tamsulosen


Status: Acute   





(5) CKD (chronic kidney disease) stage 2, GFR 60-89 ml/min


Status: Acute   





(6) Acute kidney failure


Status: Acute   





- Assessment and Plan (Free Text)


Assessment: 





DVT ppx : scds


PUD ppx rx with ppi/protonix


Plan: 





Patient seen and examined at bedside. Patient remains hemodynamically stable. 

GIven the risks of type II mi and history of V-tach. Patient was informed of 

the necessacity for close monitoring.


Patient verbalized understanding.


Risks, benefits and alternatives explained to patient.


All questions answered.


cc time 45 minutes, excluding any time spent in any procedures


d/w ICU team/nuring


-

## 2017-11-09 NOTE — CP.PCM.HP
History of Present Illness





- History of Present Illness


History of Present Illness: 





                                Patient is a known case of CAD, had been on 

Brilinta  and ASA . Had been having abdominal discomfort for 3 weeks now





                 and claims he had black stools yesterday.





Present on Admission





- Present on Admission


Any Indicators Present on Admission: No





Review of Systems





- Review of Systems


Systems not reviewed;Unavailable: Acuity of Condition





- Gastrointestinal


Gastrointestinal: Bloating, Hematochezia, Melena





Past Patient History





- Infectious Disease


Hx of Infectious Diseases: None





- Tetanus Immunizations


Tetanus Immunization: Unknown





- Past Medical History & Family History


Past Medical History?: Yes





- Past Social History


Smoking Status: Former Smoker


Alcohol: None


Drugs: Denies


Home Situation {Lives}: With Family





- CARDIAC


Hx Cardiac Disorders: Yes


Hx Cardia Arrhythmia: Yes (Ventricular fibrillations x 2 in the past with acute 

MI's.)


Hx Congestive Heart Failure: Yes


Hx Heart Attack: Yes


Hx Hypercholesterolemia: Yes


Hx Hypertension: Yes





- PULMONARY


Hx Respiratory Disorders: No


Hx Pneumonia: Yes





- NEUROLOGICAL


Hx Neurological Disorder: No





- HEENT


Hx HEENT Problems: Yes


Hx Deafness: Yes (HEARING AIDS)


Hx Glaucoma: Yes





- RENAL


Hx Chronic Kidney Disease: Yes


Hx Kidney Stones: Yes


Other/Comment: Perforated bladder 2015





- ENDOCRINE/METABOLIC


Hx Endocrine Disorders: Yes (Pre diabetes/no medication)


Other/Comment: "borderline diabetes"





- HEMATOLOGICAL/ONCOLOGICAL


Hx Blood Disorders: Yes


Hx Blood Transfusions: Yes


Hx Cirrhosis: No


Hx Hepatitis A: No


Hx Hepatitis B: No


Hx Hepatitis C: No


Hx Human Immunodeficiency Virus (HIV): No





- INTEGUMENTARY


Hx Dermatological Problems: No





- MUSCULOSKELETAL/RHEUMATOLOGICAL


Hx Musculoskeletal Disorders: Yes


Hx Back Pain: Yes


Hx Falls: No


Hx Herniated Disk: Yes ("LOWER BACK")





- GASTROINTESTINAL


Hx Gastrointestinal Disorders: Yes


Hx Bowel Surgery: No


Hx Clostridium Difficile: No


Hx Colitis: No


Hx Colostomy: No


Hx Constipation: Yes


Hx Crohn's Disease: No


Hx Diarrhea: No


Hx Diverticulitis: No


Hx Esophageal Varices: No


Hx Fatty Liver Disease: No


Hx Gall Bladder Disease: No


Hx Gastritis: Yes


Hx Gastroesophageal Reflux: Yes


Hx Hemorrhoids: No


Hx Ileostomy: No


Hx Irritable Bowel: No


Hx Liver Failure: No


Hx Nausea: No


Hx Pancreatitis: No


HX Swallowing Problems: No


Hx Ulcer: Yes (2008 with H pylori treated)





- GENITOURINARY/GYNECOLOGICAL


Hx Genitourinary Disorders: Yes


Hx Prostate Problems: Yes





- PSYCHIATRIC


Hx Psychophysiologic Disorder: No


Hx Substance Use: No





- SURGICAL HISTORY


Hx Surgeries: Yes


Hx Coronary Stent: Yes (X4)


Other/Comment: Bladder/Prostate surgery and perforation repair





- ANESTHESIA


Hx Anesthesia: Yes


Hx Anesthesia Reactions: No (? HAD MI 2013 AFTER PROSTATE SURGERY-PT ALSO HAD 

BLEEDING)


Hx Malignant Hyperthermia: No





Meds


Allergies/Adverse Reactions: 


 Allergies











Allergy/AdvReac Type Severity Reaction Status Date / Time


 


Penicillins Allergy Unknown HAPPENED Verified 11/09/17 10:10





   AS A  





   CHILD-UNKNOWN  





   REACTION  














Physical Exam





- Constitutional


Appears: Well, No Acute Distress





- Head Exam


Head Exam: ATRAUMATIC, NORMAL INSPECTION, NORMOCEPHALIC





- Eye Exam


Pupil Exam: PERRL





- ENT Exam


ENT Exam: Mucous Membranes Moist





- Neck Exam


Neck exam: Positive for: Full Rom





- Respiratory Exam


Respiratory Exam: Clear to Auscultation Bilateral, NORMAL BREATHING PATTERN





- Cardiovascular Exam


Cardiovascular Exam: REGULAR RHYTHM, +S1, +S2





- GI/Abdominal Exam


GI & Abdominal Exam: Normal Bowel Sounds, Soft, Tenderness





- Rectal Exam


Rectal Exam: Deferred





- Back Exam


Back exam: NORMAL INSPECTION





- Neurological Exam


Neurological exam: Alert, Oriented x3





- Skin


Skin Exam: Dry, Intact, Normal Color, Pallor, Warm





Results





- Vital Signs


Recent Vital Signs: 





 Last Vital Signs











Temp  98.4 F   11/09/17 18:13


 


Pulse  75   11/09/17 18:13


 


Resp  20   11/09/17 18:13


 


BP  128/73   11/09/17 18:13


 


Pulse Ox  98   11/09/17 16:00














- Labs


Result Diagrams: 


 11/09/17 11:25





 11/09/17 11:25


Labs: 





 Laboratory Results - last 24 hr











  11/09/17 11/09/17 11/09/17





  11:25 11:25 11:25


 


WBC  5.6  


 


RBC  2.85 L  


 


Hgb  4.9 L* D  


 


Hct  17.2 L  


 


MCV  60.3 L D  


 


MCH  17.2 L  


 


MCHC  28.6 L  


 


RDW  19.6 H  


 


Plt Count  284  D  


 


MPV  8.5  


 


Neut % (Auto)  69.0  


 


Lymph % (Auto)  16.4 L  


 


Mono % (Auto)  12.2 H  


 


Eos % (Auto)  1.1  


 


Baso % (Auto)  1.3  


 


Neut #  3.8  


 


Lymph #  0.9 L  


 


Mono #  0.7  


 


Eos #  0.1  


 


Baso #  0.1  


 


Smear Path Review    


 


PT   12.9 H 


 


INR   1.1 


 


APTT   31 


 


Sodium   


 


Potassium   


 


Chloride   


 


Carbon Dioxide   


 


Anion Gap   


 


BUN   


 


Creatinine   


 


Est GFR ( Amer)   


 


Est GFR (Non-Af Amer)   


 


Random Glucose   


 


Calcium   


 


Total Bilirubin   


 


AST   


 


ALT   


 


Alkaline Phosphatase   


 


Total Protein   


 


Albumin   


 


Globulin   


 


Albumin/Globulin Ratio   


 


Lipase   


 


Urine Color    Yellow


 


Urine Clarity    Hazy


 


Urine pH    8.0


 


Ur Specific Gravity    1.014


 


Urine Protein    1+ H


 


Urine Glucose (UA)    Normal


 


Urine Ketones    Negative


 


Urine Blood    1+ H


 


Urine Nitrate    Negative


 


Urine Bilirubin    Negative


 


Urine Urobilinogen    Normal


 


Ur Leukocyte Esterase    3+ H


 


Urine WBC (Auto)    62 H


 


Urine RBC (Auto)    2


 


Ur Squamous Epith Cells    2


 


Triple Phos Crystals    Occ H


 


Urine Bacteria    Rare


 


Blood Type   


 


Antibody Screen   














  11/09/17 11/09/17





  11:25 11:25


 


WBC  


 


RBC  


 


Hgb  


 


Hct  


 


MCV  


 


MCH  


 


MCHC  


 


RDW  


 


Plt Count  


 


MPV  


 


Neut % (Auto)  


 


Lymph % (Auto)  


 


Mono % (Auto)  


 


Eos % (Auto)  


 


Baso % (Auto)  


 


Neut #  


 


Lymph #  


 


Mono #  


 


Eos #  


 


Baso #  


 


Smear Path Review  


 


PT  


 


INR  


 


APTT  


 


Sodium  136 


 


Potassium  4.3 


 


Chloride  104 


 


Carbon Dioxide  22 


 


Anion Gap  15 


 


BUN  19 


 


Creatinine  1.5 


 


Est GFR ( Amer)  57 


 


Est GFR (Non-Af Amer)  47 


 


Random Glucose  101 


 


Calcium  8.2 L 


 


Total Bilirubin  0.6 


 


AST  24 


 


ALT  27 


 


Alkaline Phosphatase  47 


 


Total Protein  6.5 


 


Albumin  3.7 


 


Globulin  2.8 


 


Albumin/Globulin Ratio  1.3 


 


Lipase  120 


 


Urine Color  


 


Urine Clarity  


 


Urine pH  


 


Ur Specific Gravity  


 


Urine Protein  


 


Urine Glucose (UA)  


 


Urine Ketones  


 


Urine Blood  


 


Urine Nitrate  


 


Urine Bilirubin  


 


Urine Urobilinogen  


 


Ur Leukocyte Esterase  


 


Urine WBC (Auto)  


 


Urine RBC (Auto)  


 


Ur Squamous Epith Cells  


 


Triple Phos Crystals  


 


Urine Bacteria  


 


Blood Type   A POSITIVE


 


Antibody Screen   Negative














Assessment & Plan





- Assessment and Plan (Free Text)


Assessment: 





                         Assessment:








                    Severe anemia from blood loss.





                    UGI Bleeding?





                     CAD





                    Pre diabetes





                    HTN.


  


                    BPH








             


Plan: 





                                         Plan:








               Hold Brilinta and ASA





              Blood transfusion to 10 hgb,





            Transfer to ICU





             GI and cardiology consult





- Date & Time


Date: 11/09/17


Time: 06:40

## 2017-11-09 NOTE — CP.PCM.CON
History of Present Illness





- History of Present Illness


History of Present Illness: 





65 yo AA male went to see his Cardiologist yesterday afternoon due to epigastric

/chest pain. Found to have hgb-5 on exam and told to go to ED. Patient reports 

he has been having heartburn and 2 days PTA he had black tarry large stool. Has 

h/o Peptic ulcer treated in [place] about ten years ago. Has extensive CAD 

where he has failed Plavix, Effient and other anti-platelet drugs and required 

several stents between 2013 and 2016. Also h/o CHF. Reports his brother had 

colon cancer and patient has never had a colonoscopy. Felt weak and lightheaded 

and found to have hgb-4.  in ED here. Transfusion of PRBCs is ongoing with a 

total of 6 units ordered between today and tomorrow. No vomiting, fever or 

chills. Last took blood thinners this am. Takes Famotidine as outpatient 

according to his wife and Dr Burnham at bedside. 





Review of Systems





- Cardiovascular


Cardiovascular: Dyspnea on Exertion.  absent: Chest Pain





- Respiratory


Respiratory: absent: Cough, Hemoptysis





- Gastrointestinal


Gastrointestinal: As Per HPI, Abdominal Pain, Heartburn, Melena





Past Patient History





- Infectious Disease


Hx of Infectious Diseases: None





- Tetanus Immunizations


Tetanus Immunization: Unknown





- Past Medical History & Family History


Past Medical History?: Yes





- Past Social History


Smoking Status: Former Smoker


Alcohol: None


Drugs: Denies


Home Situation {Lives}: With Family





- CARDIAC


Hx Cardiac Disorders: Yes


Hx Cardia Arrhythmia: Yes (Ventricular fibrillations x 2 in the past with acute 

MI's.)


Hx Congestive Heart Failure: Yes


Hx Heart Attack: Yes


Hx Hypercholesterolemia: Yes


Hx Hypertension: Yes





- PULMONARY


Hx Pneumonia: Yes





- NEUROLOGICAL


Hx Neurological Disorder: No





- HEENT


Hx HEENT Problems: Yes


Hx Deafness: Yes (HEARING AIDS)


Hx Glaucoma: Yes





- RENAL


Hx Chronic Kidney Disease: Yes


Hx Kidney Stones: Yes


Other/Comment: Perforated bladder 2015





- ENDOCRINE/METABOLIC


Hx Endocrine Disorders: Yes (Pre diabetes/no medication)


Hx Diabetes Mellitus Type 2: Yes


Other/Comment: "borderline diabetes"





- HEMATOLOGICAL/ONCOLOGICAL


Hx Blood Disorders: Yes


Hx Blood Transfusions: Yes


Hx Cirrhosis: No


Hx Hepatitis A: No


Hx Hepatitis B: No


Hx Hepatitis C: No


Hx Human Immunodeficiency Virus (HIV): No





- INTEGUMENTARY


Hx Dermatological Problems: No





- MUSCULOSKELETAL/RHEUMATOLOGICAL


Hx Musculoskeletal Disorders: Yes


Hx Back Pain: Yes


Hx Falls: No


Hx Herniated Disk: Yes ("LOWER BACK")





- GASTROINTESTINAL


Hx Gastrointestinal Disorders: Yes


Hx Bowel Surgery: No


Hx Clostridium Difficile: No


Hx Colitis: No


Hx Colostomy: No


Hx Constipation: Yes


Hx Crohn's Disease: No


Hx Diarrhea: No


Hx Diverticulitis: No


Hx Esophageal Varices: No


Hx Fatty Liver Disease: No


Hx Gall Bladder Disease: No


Hx Gastritis: Yes


Hx Gastroesophageal Reflux: Yes


Hx Hemorrhoids: No


Hx Ileostomy: No


Hx Irritable Bowel: No


Hx Liver Failure: No


Hx Nausea: No


Hx Pancreatitis: No


HX Swallowing Problems: No


Hx Ulcer: Yes (2008 with H pylori treated)





- GENITOURINARY/GYNECOLOGICAL


Hx Genitourinary Disorders: Yes


Hx Prostate Problems: Yes





- PSYCHIATRIC


Hx Substance Use: No





- SURGICAL HISTORY


Hx Coronary Stent: Yes (X4)


Other/Comment: Bladder/Prostate surgery and perforation repair





- ANESTHESIA


Hx Anesthesia: Yes


Hx Anesthesia Reactions: No (? HAD MI 2013 AFTER PROSTATE SURGERY-PT ALSO HAD 

BLEEDING)


Hx Malignant Hyperthermia: No





Meds


Allergies/Adverse Reactions: 


 Allergies











Allergy/AdvReac Type Severity Reaction Status Date / Time


 


Penicillins Allergy Unknown HAPPENED Verified 11/09/17 10:10





   AS A  





   CHILD-UNKNOWN  





   REACTION  














- Medications


Medications: 


 Current Medications





Acetaminophen (Tylenol 325mg Tab)  650 mg PO Q6 PRN


   PRN Reason: transfusion


   Last Admin: 11/09/17 16:55 Dose:  650 mg


Carvedilol (Coreg)  6.25 mg PO BID [facility]


Furosemide (Lasix)  20 mg IVP DAILY [facility]


Sodium Chloride (Sodium Chloride 0.9%)  1,000 mls @ 100 mls/hr IV .Q10H [facility]


   Last Admin: 11/09/17 12:06 Dose:  100 mls/hr


Losartan Potassium (Cozaar)  100 mg PO DAILY [facility]


Multivitamins (Hexavitamin)  1 tab PO DAILY [facility]


Pantoprazole Sodium (Protonix Inj)  40 mg IVP Q12H [facility]


   Last Admin: 11/09/17 17:27 Dose:  40 mg


Rosuvastatin Calcium (Crestor)  10 mg PO HS [facility]


Tamsulosin HCl (Flomax)  0.4 mg PO DAILY [facility]











Physical Exam





- Constitutional


Appears: No Acute Distress





- Head Exam


Head Exam: ATRAUMATIC, NORMOCEPHALIC





- Eye Exam


Eye Exam: EOMI, PERRL





- Respiratory Exam


Respiratory Exam: Clear to Auscultation Bilateral, NORMAL BREATHING PATTERN





- Cardiovascular Exam


Cardiovascular Exam: REGULAR RHYTHM, +S1





- GI/Abdominal Exam


GI & Abdominal Exam: Distended, Hyperactive Bowel Sounds, Soft.  absent: Mass, 

Rebound, Rigid





- Rectal Exam


Rectal Exam: Deferred





- Extremities Exam


Extremities exam: Positive for: normal inspection.  Negative for: pedal edema





- Neurological Exam


Neurological exam: Alert, CN II-XII Intact, Oriented x3





- Psychiatric Exam


Psychiatric exam: Normal Affect, Normal Mood





- Skin


Skin Exam: Dry, Warm





Results





- Vital Signs


Recent Vital Signs: 


 Last Vital Signs











Temp  98.3 F   11/09/17 17:58


 


Pulse  75   11/09/17 17:58


 


Resp  20   11/09/17 17:58


 


BP  128/69   11/09/17 17:58


 


Pulse Ox  98   11/09/17 16:00














- Labs


Result Diagrams: 


 11/09/17 11:25





 11/09/17 11:25


Labs: 


 Laboratory Results - last 24 hr











  11/09/17 11/09/17 11/09/17





  11:25 11:25 11:25


 


WBC  5.6  


 


RBC  2.85 L  


 


Hgb  4.9 L* D  


 


Hct  17.2 L  


 


MCV  60.3 L D  


 


MCH  17.2 L  


 


MCHC  28.6 L  


 


RDW  19.6 H  


 


Plt Count  284  D  


 


MPV  8.5  


 


Neut % (Auto)  69.0  


 


Lymph % (Auto)  16.4 L  


 


Mono % (Auto)  12.2 H  


 


Eos % (Auto)  1.1  


 


Baso % (Auto)  1.3  


 


Neut #  3.8  


 


Lymph #  0.9 L  


 


Mono #  0.7  


 


Eos #  0.1  


 


Baso #  0.1  


 


Smear Path Review    


 


PT   12.9 H 


 


INR   1.1 


 


APTT   31 


 


Sodium   


 


Potassium   


 


Chloride   


 


Carbon Dioxide   


 


Anion Gap   


 


BUN   


 


Creatinine   


 


Est GFR ( Amer)   


 


Est GFR (Non-Af Amer)   


 


Random Glucose   


 


Calcium   


 


Total Bilirubin   


 


AST   


 


ALT   


 


Alkaline Phosphatase   


 


Total Protein   


 


Albumin   


 


Globulin   


 


Albumin/Globulin Ratio   


 


Lipase   


 


Urine Color    Yellow


 


Urine Clarity    Hazy


 


Urine pH    8.0


 


Ur Specific Gravity    1.014


 


Urine Protein    1+ H


 


Urine Glucose (UA)    Normal


 


Urine Ketones    Negative


 


Urine Blood    1+ H


 


Urine Nitrate    Negative


 


Urine Bilirubin    Negative


 


Urine Urobilinogen    Normal


 


Ur Leukocyte Esterase    3+ H


 


Urine WBC (Auto)    62 H


 


Urine RBC (Auto)    2


 


Ur Squamous Epith Cells    2


 


Triple Phos Crystals    Occ H


 


Urine Bacteria    Rare


 


Blood Type   


 


Antibody Screen   














  11/09/17 11/09/17





  11:25 11:25


 


WBC  


 


RBC  


 


Hgb  


 


Hct  


 


MCV  


 


MCH  


 


MCHC  


 


RDW  


 


Plt Count  


 


MPV  


 


Neut % (Auto)  


 


Lymph % (Auto)  


 


Mono % (Auto)  


 


Eos % (Auto)  


 


Baso % (Auto)  


 


Neut #  


 


Lymph #  


 


Mono #  


 


Eos #  


 


Baso #  


 


Smear Path Review  


 


PT  


 


INR  


 


APTT  


 


Sodium  136 


 


Potassium  4.3 


 


Chloride  104 


 


Carbon Dioxide  22 


 


Anion Gap  15 


 


BUN  19 


 


Creatinine  1.5 


 


Est GFR ( Amer)  57 


 


Est GFR (Non-Af Amer)  47 


 


Random Glucose  101 


 


Calcium  8.2 L 


 


Total Bilirubin  0.6 


 


AST  24 


 


ALT  27 


 


Alkaline Phosphatase  47 


 


Total Protein  6.5 


 


Albumin  3.7 


 


Globulin  2.8 


 


Albumin/Globulin Ratio  1.3 


 


Lipase  120 


 


Urine Color  


 


Urine Clarity  


 


Urine pH  


 


Ur Specific Gravity  


 


Urine Protein  


 


Urine Glucose (UA)  


 


Urine Ketones  


 


Urine Blood  


 


Urine Nitrate  


 


Urine Bilirubin  


 


Urine Urobilinogen  


 


Ur Leukocyte Esterase  


 


Urine WBC (Auto)  


 


Urine RBC (Auto)  


 


Ur Squamous Epith Cells  


 


Triple Phos Crystals  


 


Urine Bacteria  


 


Blood Type   A POSITIVE


 


Antibody Screen   Negative














Assessment & Plan


(1) Melena


Assessment and Plan: 


GI bleeding likely due to be from an UGI source, Ulcer, gastritis, as he has 

been on Brillanta and ASA without a PPI prophylaxis. Has been taking Famotidine 

and is at high risk for bleed especially given h/o peptic ulcer in the past. 

Doubt LGI source but patient does have a brother with colon cancer and has 

never been screened due to cardiac risk. 





Case discussed with Dr Spivey and Chacha


Need to hold Brillanta and ASA for 48 hours if possible to facilitate a 

therapeutic EGD if needed. Can perform procedure sooner in event of life 

threatening emesis or hemodynamic instability. 


Transfuse to hgb=10 and will start Protonix 40mg IV Q12.


If H/H stabilize over night with no further bleeding can consider Heparin or 

Lovenox as a bridge to the EGD given his h/o of coronary occlusion in the past 

when these meds were held. Unable to perform adequate hemostatic maneuvers if 

on anti-platelet drugs + anticoagulants. 


Clear liquid diet only for now.


Will plan EGD when stable in 48 hours. 


Consider colonoscopy when medically atable, even screening while on anti-

platelet drugs vs Cologard fecal screening


Advise ICU consultation for transfer given his risk of CHF during transfusion 

or acute MI off antiplatelet drugs, recurrent bleeding. 


Status: Acute   Priority: High   





(2) Anemia associated with acute blood loss


Assessment and Plan: 


as above


Status: Acute   





(3) H/O peptic ulcer


Status: Resolved   Priority: Low   





(4) Coronary artery disease


Status: Chronic   Priority: High

## 2017-11-10 LAB
ALBUMIN/GLOB SERPL: 0.9 {RATIO} (ref 1–2.1)
ALP SERPL-CCNC: 50 U/L (ref 38–126)
ALT SERPL-CCNC: 31 U/L (ref 21–72)
AST SERPL-CCNC: 19 U/L (ref 17–59)
BASOPHILS # BLD AUTO: 0.1 K/UL (ref 0–0.2)
BASOPHILS # BLD AUTO: 0.1 K/UL (ref 0–0.2)
BASOPHILS NFR BLD: 0.9 % (ref 0–2)
BASOPHILS NFR BLD: 0.9 % (ref 0–2)
BILIRUB SERPL-MCNC: 1.2 MG/DL (ref 0.2–1.3)
BUN SERPL-MCNC: 14 MG/DL (ref 9–20)
CALCIUM SERPL-MCNC: 7.8 MG/DL (ref 8.6–10.4)
CHLORIDE SERPL-SCNC: 105 MMOL/L (ref 98–107)
CO2 SERPL-SCNC: 22 MMOL/L (ref 22–30)
EOSINOPHIL # BLD AUTO: 0.1 K/UL (ref 0–0.7)
EOSINOPHIL # BLD AUTO: 0.2 K/UL (ref 0–0.7)
EOSINOPHIL NFR BLD: 1.4 % (ref 0–4)
EOSINOPHIL NFR BLD: 1.8 % (ref 0–4)
ERYTHROCYTE [DISTWIDTH] IN BLOOD BY AUTOMATED COUNT: 26.5 % (ref 11.5–14.5)
ERYTHROCYTE [DISTWIDTH] IN BLOOD BY AUTOMATED COUNT: 27.2 % (ref 11.5–14.5)
GLOBULIN SER-MCNC: 3.7 GM/DL (ref 2.2–3.9)
GLUCOSE SERPL-MCNC: 79 MG/DL (ref 75–110)
HCT VFR BLD CALC: 25.5 % (ref 35–51)
HCT VFR BLD CALC: 34.5 % (ref 35–51)
IRON SERPL-MCNC: 30 UG/DL (ref 49–181)
LYMPHOCYTES # BLD AUTO: 1.3 K/UL (ref 1–4.3)
LYMPHOCYTES # BLD AUTO: 1.3 K/UL (ref 1–4.3)
LYMPHOCYTES NFR BLD AUTO: 14.8 % (ref 20–40)
LYMPHOCYTES NFR BLD AUTO: 19.6 % (ref 20–40)
MAGNESIUM SERPL-MCNC: 1.7 MG/DL (ref 1.6–2.3)
MCH RBC QN AUTO: 21.2 PG (ref 27–31)
MCH RBC QN AUTO: 22.7 PG (ref 27–31)
MCHC RBC AUTO-ENTMCNC: 30.9 G/DL (ref 33–37)
MCHC RBC AUTO-ENTMCNC: 31.7 G/DL (ref 33–37)
MCV RBC AUTO: 68.5 FL (ref 80–94)
MCV RBC AUTO: 71.6 FL (ref 80–94)
MONOCYTES # BLD: 0.8 K/UL (ref 0–0.8)
MONOCYTES # BLD: 0.9 K/UL (ref 0–0.8)
MONOCYTES NFR BLD: 10.2 % (ref 0–10)
MONOCYTES NFR BLD: 12.1 % (ref 0–10)
NRBC BLD AUTO-RTO: 0.1 % (ref 0–2)
NRBC BLD AUTO-RTO: 0.2 % (ref 0–2)
PHOSPHATE SERPL-MCNC: 3.4 MG/DL (ref 2.5–4.5)
PLATELET # BLD: 261 K/UL (ref 130–400)
PLATELET # BLD: 280 K/UL (ref 130–400)
PMV BLD AUTO: 8.7 FL (ref 7.2–11.7)
PMV BLD AUTO: 8.9 FL (ref 7.2–11.7)
POTASSIUM SERPL-SCNC: 3.8 MMOL/L (ref 3.6–5.2)
PROT SERPL-MCNC: 7.1 G/DL (ref 6.3–8.3)
SODIUM SERPL-SCNC: 134 MMOL/L (ref 132–148)
WBC # BLD AUTO: 6.6 K/UL (ref 4.8–10.8)
WBC # BLD AUTO: 8.8 K/UL (ref 4.8–10.8)

## 2017-11-10 RX ADMIN — PANTOPRAZOLE SODIUM SCH MLS/HR: 40 INJECTION, POWDER, FOR SOLUTION INTRAVENOUS at 09:02

## 2017-11-10 RX ADMIN — Medication SCH TAB: at 09:09

## 2017-11-10 RX ADMIN — PANTOPRAZOLE SODIUM SCH: 40 INJECTION, POWDER, FOR SOLUTION INTRAVENOUS at 18:20

## 2017-11-10 RX ADMIN — PANTOPRAZOLE SODIUM SCH MLS/HR: 40 INJECTION, POWDER, FOR SOLUTION INTRAVENOUS at 20:00

## 2017-11-10 NOTE — CP.PCM.PN
Subjective





- Date & Time of Evaluation


Date of Evaluation: 11/10/17


Time of Evaluation: 14:02





- Subjective


Subjective: 





Patient seen in ICU


No bleeding, melena, pain or any bowel movement. hgb=7.9 after 3u PRBC's.


Blood thinners on hold





Objective





- Vital Signs/Intake and Output


Vital Signs (last 24 hours): 


 











Temp Pulse Resp BP Pulse Ox


 


 98.2 F   62   17   132/69   100 


 


 11/10/17 12:00  11/10/17 13:10  11/10/17 13:10  11/10/17 13:10  11/10/17 13:10








Intake and Output: 


 











 11/10/17 11/10/17





 06:59 18:59


 


Intake Total 2259 720


 


Output Total 4250 


 


Balance -1991 720














- Medications


Medications: 


 Current Medications





Acetaminophen (Tylenol 325mg Tab)  650 mg PO Q6 PRN


   PRN Reason: transfusion


   Last Admin: 11/10/17 09:09 Dose:  650 mg


Albuterol/Ipratropium (Duoneb 3 Mg/0.5 Mg (3 Ml) Ud)  3 ml INH RQ6 PRN


   PRN Reason: Shortness of Breath


Carvedilol (Coreg)  6.25 mg PO BID [facility]


   Last Admin: 11/10/17 09:06 Dose:  Not Given


Furosemide (Lasix)  20 mg IVP DAILY [facility]


   Last Admin: 11/10/17 09:09 Dose:  20 mg


Pantoprazole Sodium 80 mg/ (Dextrose)  100 mls @ 10 mls/hr IVPB .Q10H [facility]


   PRN Reason: 8 MG/HR


   Last Admin: 11/10/17 09:02 Dose:  10 mls/hr


Losartan Potassium (Cozaar)  100 mg PO DAILY [facility]


   Last Admin: 11/10/17 09:06 Dose:  Not Given


Multivitamins (Hexavitamin)  1 tab PO DAILY [facility]


   Last Admin: 11/10/17 09:09 Dose:  1 tab


Rosuvastatin Calcium (Crestor)  10 mg PO HS [facility]


   Last Admin: 11/09/17 21:22 Dose:  Not Given


Tamsulosin HCl (Flomax)  0.4 mg PO DAILY [facility]


   Last Admin: 11/10/17 09:09 Dose:  0.4 mg











- Labs


Labs: 


 





 11/10/17 05:15 





 11/10/17 05:15 





 











PT  12.9 SECONDS (9.7-12.2)  H  11/09/17  11:25    


 


INR  1.1   11/09/17  11:25    


 


APTT  31 SECONDS (21-34)   11/09/17  11:25    














- Constitutional


Appears: No Acute Distress





- Head Exam


Head Exam: ATRAUMATIC, NORMOCEPHALIC





- Eye Exam


Eye Exam: EOMI, PERRL





- Respiratory Exam


Respiratory Exam: NORMAL BREATHING PATTERN





- Cardiovascular Exam


Cardiovascular Exam: REGULAR RHYTHM





- GI/Abdominal Exam


GI & Abdominal Exam: Soft, Hyperactive Bowel Sounds.  absent: Guarding, Rigid, 

Tenderness, Mass, Rebound





- Rectal Exam


Rectal Exam: Deferred





- Extremities Exam


Extremities Exam: Normal Inspection





Assessment and Plan


(1) Melena


Assessment & Plan: 


Hemoglobin appropriate for level of transfusion given. Two more units received 

today


Continue IV Protonix


May advance diet today but NPO for EGD tomorrow morning.


Repeat labs.


No blood thinners to be given pre-op to allow for hemostatic alternatives if 

needed.


Status: Acute   





(2) Anemia associated with acute blood loss


Assessment & Plan: 


as above


Status: Acute   





(3) H/O peptic ulcer


Status: Resolved   





(4) Coronary artery disease


Status: Chronic

## 2017-11-10 NOTE — CP.PCM.PN
Subjective





- Date & Time of Evaluation


Date of Evaluation: 11/10/17


Time of Evaluation: 09:50





- Subjective


Subjective: 





                                                  patient is comfortable. No 

signs of bleeding. Hgb this AM after 3 units of PRC was 7.8 indicating no 

continuation





                    of bleeding. On his 4th unit of PRC infusion. Patiemnt 

tolerating the transfusion very well. No SOB or chest pains. Claims he feels 





                   stronger.





Objective





- Vital Signs/Intake and Output


Vital Signs (last 24 hours): 


 











Temp Pulse Resp BP Pulse Ox


 


 98.2 F   68   18   123/63   100 


 


 11/10/17 09:05  11/10/17 09:05  11/10/17 09:05  11/10/17 09:09  11/10/17 07:40








Intake and Output: 


 











 11/10/17 11/10/17





 06:59 18:59


 


Intake Total 2259 10


 


Output Total 4250 


 


Balance -1991 10














- Medications


Medications: 


 Current Medications





Acetaminophen (Tylenol 325mg Tab)  650 mg PO Q6 PRN


   PRN Reason: transfusion


   Last Admin: 11/10/17 09:09 Dose:  650 mg


Carvedilol (Coreg)  6.25 mg PO BID [facility]


   Last Admin: 11/10/17 09:06 Dose:  Not Given


Furosemide (Lasix)  20 mg IVP DAILY [facility]


   Last Admin: 11/10/17 09:09 Dose:  20 mg


Pantoprazole Sodium 80 mg/ (Dextrose)  100 mls @ 10 mls/hr IVPB .Q10H [facility]


   PRN Reason: 8 MG/HR


   Last Admin: 11/10/17 09:02 Dose:  10 mls/hr


Losartan Potassium (Cozaar)  100 mg PO DAILY [facility]


   Last Admin: 11/10/17 09:06 Dose:  Not Given


Multivitamins (Hexavitamin)  1 tab PO DAILY [facility]


   Last Admin: 11/10/17 09:09 Dose:  1 tab


Rosuvastatin Calcium (Crestor)  10 mg PO HS [facility]


   Last Admin: 11/09/17 21:22 Dose:  Not Given


Tamsulosin HCl (Flomax)  0.4 mg PO DAILY [facility]


   Last Admin: 11/10/17 09:09 Dose:  0.4 mg











- Labs


Labs: 


 





 11/10/17 05:15 





 11/10/17 05:15 





 











PT  12.9 SECONDS (9.7-12.2)  H  11/09/17  11:25    


 


INR  1.1   11/09/17  11:25    


 


APTT  31 SECONDS (21-34)   11/09/17  11:25    














- Constitutional


Appears: Well, No Acute Distress





- Head Exam


Head Exam: ATRAUMATIC, NORMAL INSPECTION, NORMOCEPHALIC





- Eye Exam


Pupil Exam: PERRL





- ENT Exam


ENT Exam: Mucous Membranes Moist, Normal External Ear Exam





- Neck Exam


Neck Exam: Full ROM





- Respiratory Exam


Respiratory Exam: Clear to Ausculation Bilateral, NORMAL BREATHING PATTERN





- Cardiovascular Exam


Cardiovascular Exam: REGULAR RHYTHM, +S1, +S2





- GI/Abdominal Exam


GI & Abdominal Exam: Soft


Additional comments: 





                                   Epoigastric and midabdominal tenderness. No 

masses.





- Rectal Exam


Rectal Exam: Deferred





- Extremities Exam


Extremities Exam: Full ROM, Normal Inspection





- Back Exam


Back Exam: Full ROM, NORMAL INSPECTION





- Neurological Exam


Neurological Exam: Alert, Awake, Oriented x3





- Psychiatric Exam


Psychiatric exam: Normal Affect, Normal Mood





- Skin


Skin Exam: Dry, Intact, Normal Color, Warm





Assessment and Plan


(1) Anemia


Status: Acute   





(2) Anemia associated with acute blood loss


Status: Acute   





(3) GI bleeding


Status: Acute   





(4) Melena


Status: Acute   





(5) Severe anemia


Status: Acute   





(6) CAD (coronary artery disease)


Status: Chronic   





(7) Congestive heart failure (CHF)


Status: Chronic   





(8) Pre-diabetes


Status: Chronic   





(9) Hyperlipidemia LDL goal <100


Status: Acute   





(10) Hyperlipidemia LDL goal <70


Status: Chronic   





- Assessment and Plan (Free Text)


Plan: 





                                      Status---Acute








                Plan:





         Tranfuse PRB to  HGB 10





         For endoscopy in AM.





         Hold Brilinta and ASA for now.





         Continue IV Protonix.

## 2017-11-10 NOTE — CP.[email or web address]
Addendum entered and electronically signed by Ellen Orosco  11/10/17 11:32

: 


Today's plan: patient stable for transfer to med/surg floor 








Original Note:








CCU Subjective





- Physician Review


Subjective (Free Text): 


Patient seen and examined at bedside. Patient says he is feeling good. Patient 

has no dizziness, headache, shortness of breath, abdominal pain, nausea, 

vomiting, constipation, or diarrhea. Patient had no bowel movements overnight. 


11/10/17 11:16








CCU Objective





- Vital Signs / Intake & Output


Vital Signs (Last 4 hours): 


Vital Signs











  Temp Pulse Resp BP Pulse Ox


 


 11/10/17 10:40   68  19  137/70  100


 


 11/10/17 09:40   83  14  137/74  100


 


 11/10/17 09:35  98.4 F  64  18  133/67 


 


 11/10/17 09:09     123/63 


 


 11/10/17 09:05  98.2 F  68  18  122/68 


 


 11/10/17 08:50  98.2 F  75  18  123/63 


 


 11/10/17 08:40   68  18  123/63  100


 


 11/10/17 08:35  98.2 F  75  18  124/62 


 


 11/10/17 08:30  98.2 F  73  18  121/59 L 


 


 11/10/17 08:00  98.0 F  68   


 


 11/10/17 07:40   70  20  121/59 L  100











Intake and Output (Last 8hrs): 


 Intake & Output











 11/09/17 11/10/17 11/10/17





 22:59 06:59 14:59


 


Intake Total 342 1917 365


 


Output Total  4250 


 


Balance 342 -2333 365


 


Weight  189 lb 9 oz 


 


Intake:   


 


  Intake, IV Amount  530 40


 


    Left Antecubital  180 40


 


    Right Hand  350 


 


  Oral  320 


 


  Blood Product 292 1067 325


 


    Apheresis Rbc Cp2d As3 Lr 292  





    1st  Unit [id number]   


 


    Red Blood Cells Cpd As1   325





    Lr  Unit [id number]   


 


    Red Blood Cells Cpd As1  325 





    Lr  Unit [id number]   


 


  Other 50  


 


    Apheresis Rbc Cp2d As3 Lr 50  





    1st  Unit [id number]   


 


Output:   


 


  Urine  4250 


 


    Urine, Voided  3800 


 


Other:   


 


  # Voids   


 


    Urine, Voided  4 


 


  # Bowel Movements  0 














- Physical Exam


Head: Positive for: Atraumatic, Normocephalic


Pupils: Positive for: PERRL


Extroacular Muscles: Positive for: EOMI


Mouth: Positive for: Moist Mucous Membranes


Respiratory/Chest: Positive for: Clear to Auscultation, Good Air Exchange.  

Negative for: Respiratory Distress, Accessory Muscle Use


Cardiovascular: Positive for: Regular Rate and Rhythm, Normal S1, S2


Abdomen: Positive for: Normal Bowel Sounds.  Negative for: Tenderness, 

Distention


Upper Extremity: Positive for: Normal Inspection


Lower Extremity: Positive for: Normal Inspection


Neurological: Positive for: GCS=15


Skin: Positive for: Warm, Normal Color.  Negative for: Rashes


Psychiatric: Positive for: Alert, Oriented x 3





- Medications


Active Medications: 


Active Medications











Generic Name Dose Route Start Last Admin





  Trade Name Freq  PRN Reason Stop Dose Admin


 


Acetaminophen  650 mg  11/09/17 15:42  11/10/17 09:09





  Tylenol 325mg Tab  PO   650 mg





  Q6 PRN   Administration





  transfusion   


 


Carvedilol  6.25 mg  11/09/17 18:00  11/10/17 09:06





  Coreg  PO   Not Given





  BID GRETCHEN   


 


Furosemide  20 mg  11/10/17 10:00  11/10/17 09:09





  Lasix  IVP   20 mg





  DAILY GRETCHEN   Administration


 


Pantoprazole Sodium 80 mg/  100 mls @ 10 mls/hr  11/09/17 20:30  11/10/17 09:02





  Dextrose  IVPB   10 mls/hr





  .Q10H GRETCHEN   Administration





  8 MG/HR   


 


Losartan Potassium  100 mg  11/09/17 16:30  11/10/17 09:06





  Cozaar  PO   Not Given





  DAILY GRETCHEN   


 


Multivitamins  1 tab  11/09/17 16:30  11/10/17 09:09





  Hexavitamin  PO   1 tab





  DAILY GRETCHEN   Administration


 


Rosuvastatin Calcium  10 mg  11/09/17 22:00  11/09/17 21:22





  Crestor  PO   Not Given





  HS GRETCHEN   


 


Tamsulosin HCl  0.4 mg  11/09/17 16:30  11/10/17 09:09





  Flomax  PO   0.4 mg





  DAILY GRETCHEN   Administration














- Patient Studies


Lab Studies: 


 Lab Studies











  11/10/17 11/10/17 11/10/17 Range/Units





  05:15 05:15 05:15 


 


WBC   6.6   (4.8-10.8)  K/uL


 


RBC   3.72 L   (4.40-5.90)  Mil/uL


 


Hgb   7.9 L D   (12.0-18.0)  g/dL


 


Hct   25.5 L   (35.0-51.0)  %


 


MCV   68.5 L D   (80.0-94.0)  fL


 


MCH   21.2 L   (27.0-31.0)  pg


 


MCHC   30.9 L   (33.0-37.0)  g/dL


 


RDW   26.5 H   (11.5-14.5)  %


 


Plt Count   261   (130-400)  K/uL


 


MPV   8.9   (7.2-11.7)  fL


 


Neut % (Auto)   66.0   (50.0-75.0)  %


 


Lymph % (Auto)   19.6 L   (20.0-40.0)  %


 


Mono % (Auto)   12.1 H   (0.0-10.0)  %


 


Eos % (Auto)   1.4   (0.0-4.0)  %


 


Baso % (Auto)   0.9   (0.0-2.0)  %


 


Neut #   4.4   (1.8-7.0)  K/uL


 


Lymph #   1.3   (1.0-4.3)  K/uL


 


Mono #   0.8   (0.0-0.8)  K/uL


 


Eos #   0.1   (0.0-0.7)  K/uL


 


Baso #   0.1   (0.0-0.2)  K/uL


 


Smear Path Review     


 


PT     (9.7-12.2)  SECONDS


 


INR     


 


APTT     (21-34)  SECONDS


 


Sodium  134    (132-148)  mmol/L


 


Potassium  3.8    (3.6-5.2)  mmol/L


 


Chloride  105    ()  mmol/L


 


Carbon Dioxide  22    (22-30)  mmol/L


 


Anion Gap  11    (10-20)  


 


BUN  14    (9-20)  mg/dL


 


Creatinine  1.3    (0.8-1.5)  mg/dL


 


Est GFR (African Amer)  > 60    


 


Est GFR (Non-Af Amer)  55    


 


Random Glucose  79    ()  mg/dL


 


Calcium  7.8 L    (8.6-10.4)  mg/dl


 


Phosphorus  3.4    (2.5-4.5)  mg/dL


 


Magnesium  1.7    (1.6-2.3)  mg/dL


 


Iron    30 L  ()  ug/dL


 


TIBC    401  (250-450)  ug/dL


 


% Saturation    8 L  (20-55)  


 


Total Bilirubin  1.2    (0.2-1.3)  mg/dL


 


AST  19    (17-59)  U/L


 


ALT  31    (21-72)  U/L


 


Alkaline Phosphatase  50    ()  U/L


 


Total Protein  7.1    (6.3-8.3)  g/dL


 


Albumin  3.4 L    (3.5-5.0)  g/dL


 


Globulin  3.7    (2.2-3.9)  gm/dL


 


Albumin/Globulin Ratio  0.9 L    (1.0-2.1)  


 


Lipase     ()  U/L


 


Urine Color     (YELLOW)  


 


Urine Clarity     (Clear)  


 


Urine pH     (5.0-8.0)  


 


Ur Specific Gravity     (1.003-1.030)  


 


Urine Protein     (NEGATIVE)  mg/dL


 


Urine Glucose (UA)     (Normal)  mg/dL


 


Urine Ketones     (NEGATIVE)  mg/dL


 


Urine Blood     (NEGATIVE)  


 


Urine Nitrate     (NEGATIVE)  


 


Urine Bilirubin     (NEGATIVE)  


 


Urine Urobilinogen     (0.2-1.0)  mg/dL


 


Ur Leukocyte Esterase     (Negative)  Ray/uL


 


Urine WBC (Auto)     (0-5)  /hpf


 


Urine RBC (Auto)     (0-3)  /hpf


 


Ur Squamous Epith Cells     (0-5)  /hpf


 


Triple Phos Crystals     (<OCC)  /hpf


 


Urine Bacteria     (<OCC)  


 


Blood Type     


 


Antibody Screen     














  11/09/17 11/09/17 11/09/17 Range/Units





  11:25 11:25 11:25 


 


WBC     (4.8-10.8)  K/uL


 


RBC     (4.40-5.90)  Mil/uL


 


Hgb     (12.0-18.0)  g/dL


 


Hct     (35.0-51.0)  %


 


MCV     (80.0-94.0)  fL


 


MCH     (27.0-31.0)  pg


 


MCHC     (33.0-37.0)  g/dL


 


RDW     (11.5-14.5)  %


 


Plt Count     (130-400)  K/uL


 


MPV     (7.2-11.7)  fL


 


Neut % (Auto)     (50.0-75.0)  %


 


Lymph % (Auto)     (20.0-40.0)  %


 


Mono % (Auto)     (0.0-10.0)  %


 


Eos % (Auto)     (0.0-4.0)  %


 


Baso % (Auto)     (0.0-2.0)  %


 


Neut #     (1.8-7.0)  K/uL


 


Lymph #     (1.0-4.3)  K/uL


 


Mono #     (0.0-0.8)  K/uL


 


Eos #     (0.0-0.7)  K/uL


 


Baso #     (0.0-0.2)  K/uL


 


Smear Path Review     


 


PT     (9.7-12.2)  SECONDS


 


INR     


 


APTT     (21-34)  SECONDS


 


Sodium   136   (132-148)  mmol/L


 


Potassium   4.3   (3.6-5.2)  mmol/L


 


Chloride   104   ()  mmol/L


 


Carbon Dioxide   22   (22-30)  mmol/L


 


Anion Gap   15   (10-20)  


 


BUN   19   (9-20)  mg/dL


 


Creatinine   1.5   (0.8-1.5)  mg/dL


 


Est GFR ( Amer)   57   


 


Est GFR (Non-Af Amer)   47   


 


Random Glucose   101   ()  mg/dL


 


Calcium   8.2 L   (8.6-10.4)  mg/dl


 


Phosphorus     (2.5-4.5)  mg/dL


 


Magnesium     (1.6-2.3)  mg/dL


 


Iron     ()  ug/dL


 


TIBC     (250-450)  ug/dL


 


% Saturation     (20-55)  


 


Total Bilirubin   0.6   (0.2-1.3)  mg/dL


 


AST   24   (17-59)  U/L


 


ALT   27   (21-72)  U/L


 


Alkaline Phosphatase   47   ()  U/L


 


Total Protein   6.5   (6.3-8.3)  g/dL


 


Albumin   3.7   (3.5-5.0)  g/dL


 


Globulin   2.8   (2.2-3.9)  gm/dL


 


Albumin/Globulin Ratio   1.3   (1.0-2.1)  


 


Lipase   120   ()  U/L


 


Urine Color    Yellow  (YELLOW)  


 


Urine Clarity    Hazy  (Clear)  


 


Urine pH    8.0  (5.0-8.0)  


 


Ur Specific Gravity    1.014  (1.003-1.030)  


 


Urine Protein    1+ H  (NEGATIVE)  mg/dL


 


Urine Glucose (UA)    Normal  (Normal)  mg/dL


 


Urine Ketones    Negative  (NEGATIVE)  mg/dL


 


Urine Blood    1+ H  (NEGATIVE)  


 


Urine Nitrate    Negative  (NEGATIVE)  


 


Urine Bilirubin    Negative  (NEGATIVE)  


 


Urine Urobilinogen    Normal  (0.2-1.0)  mg/dL


 


Ur Leukocyte Esterase    3+ H  (Negative)  Ray/uL


 


Urine WBC (Auto)    62 H  (0-5)  /hpf


 


Urine RBC (Auto)    2  (0-3)  /hpf


 


Ur Squamous Epith Cells    2  (0-5)  /hpf


 


Triple Phos Crystals    Occ H  (<OCC)  /hpf


 


Urine Bacteria    Rare  (<OCC)  


 


Blood Type  A POSITIVE    


 


Antibody Screen  Negative    














  11/09/17 11/09/17 Range/Units





  11:25 11:25 


 


WBC   5.6  (4.8-10.8)  K/uL


 


RBC   2.85 L  (4.40-5.90)  Mil/uL


 


Hgb   4.9 L* D  (12.0-18.0)  g/dL


 


Hct   17.2 L  (35.0-51.0)  %


 


MCV   60.3 L D  (80.0-94.0)  fL


 


MCH   17.2 L  (27.0-31.0)  pg


 


MCHC   28.6 L  (33.0-37.0)  g/dL


 


RDW   19.6 H  (11.5-14.5)  %


 


Plt Count   284  D  (130-400)  K/uL


 


MPV   8.5  (7.2-11.7)  fL


 


Neut % (Auto)   69.0  (50.0-75.0)  %


 


Lymph % (Auto)   16.4 L  (20.0-40.0)  %


 


Mono % (Auto)   12.2 H  (0.0-10.0)  %


 


Eos % (Auto)   1.1  (0.0-4.0)  %


 


Baso % (Auto)   1.3  (0.0-2.0)  %


 


Neut #   3.8  (1.8-7.0)  K/uL


 


Lymph #   0.9 L  (1.0-4.3)  K/uL


 


Mono #   0.7  (0.0-0.8)  K/uL


 


Eos #   0.1  (0.0-0.7)  K/uL


 


Baso #   0.1  (0.0-0.2)  K/uL


 


Smear Path Review     


 


PT  12.9 H   (9.7-12.2)  SECONDS


 


INR  1.1   


 


APTT  31   (21-34)  SECONDS


 


Sodium    (132-148)  mmol/L


 


Potassium    (3.6-5.2)  mmol/L


 


Chloride    ()  mmol/L


 


Carbon Dioxide    (22-30)  mmol/L


 


Anion Gap    (10-20)  


 


BUN    (9-20)  mg/dL


 


Creatinine    (0.8-1.5)  mg/dL


 


Est GFR (African Amer)    


 


Est GFR (Non-Af Amer)    


 


Random Glucose    ()  mg/dL


 


Calcium    (8.6-10.4)  mg/dl


 


Phosphorus    (2.5-4.5)  mg/dL


 


Magnesium    (1.6-2.3)  mg/dL


 


Iron    ()  ug/dL


 


TIBC    (250-450)  ug/dL


 


% Saturation    (20-55)  


 


Total Bilirubin    (0.2-1.3)  mg/dL


 


AST    (17-59)  U/L


 


ALT    (21-72)  U/L


 


Alkaline Phosphatase    ()  U/L


 


Total Protein    (6.3-8.3)  g/dL


 


Albumin    (3.5-5.0)  g/dL


 


Globulin    (2.2-3.9)  gm/dL


 


Albumin/Globulin Ratio    (1.0-2.1)  


 


Lipase    ()  U/L


 


Urine Color    (YELLOW)  


 


Urine Clarity    (Clear)  


 


Urine pH    (5.0-8.0)  


 


Ur Specific Gravity    (1.003-1.030)  


 


Urine Protein    (NEGATIVE)  mg/dL


 


Urine Glucose (UA)    (Normal)  mg/dL


 


Urine Ketones    (NEGATIVE)  mg/dL


 


Urine Blood    (NEGATIVE)  


 


Urine Nitrate    (NEGATIVE)  


 


Urine Bilirubin    (NEGATIVE)  


 


Urine Urobilinogen    (0.2-1.0)  mg/dL


 


Ur Leukocyte Esterase    (Negative)  Ray/uL


 


Urine WBC (Auto)    (0-5)  /hpf


 


Urine RBC (Auto)    (0-3)  /hpf


 


Ur Squamous Epith Cells    (0-5)  /hpf


 


Triple Phos Crystals    (<OCC)  /hpf


 


Urine Bacteria    (<OCC)  


 


Blood Type    


 


Antibody Screen    








 Laboratory Results - last 24 hr











  11/09/17 11/09/17 11/09/17





  11:25 11:25 11:25


 


WBC  5.6  


 


RBC  2.85 L  


 


Hgb  4.9 L* D  


 


Hct  17.2 L  


 


MCV  60.3 L D  


 


MCH  17.2 L  


 


MCHC  28.6 L  


 


RDW  19.6 H  


 


Plt Count  284  D  


 


MPV  8.5  


 


Neut % (Auto)  69.0  


 


Lymph % (Auto)  16.4 L  


 


Mono % (Auto)  12.2 H  


 


Eos % (Auto)  1.1  


 


Baso % (Auto)  1.3  


 


Neut #  3.8  


 


Lymph #  0.9 L  


 


Mono #  0.7  


 


Eos #  0.1  


 


Baso #  0.1  


 


Smear Path Review    


 


PT   12.9 H 


 


INR   1.1 


 


APTT   31 


 


Sodium   


 


Potassium   


 


Chloride   


 


Carbon Dioxide   


 


Anion Gap   


 


BUN   


 


Creatinine   


 


Est GFR ( Amer)   


 


Est GFR (Non-Af Amer)   


 


Random Glucose   


 


Calcium   


 


Phosphorus   


 


Magnesium   


 


Iron   


 


TIBC   


 


% Saturation   


 


Total Bilirubin   


 


AST   


 


ALT   


 


Alkaline Phosphatase   


 


Total Protein   


 


Albumin   


 


Globulin   


 


Albumin/Globulin Ratio   


 


Lipase   


 


Urine Color    Yellow


 


Urine Clarity    Hazy


 


Urine pH    8.0


 


Ur Specific Gravity    1.014


 


Urine Protein    1+ H


 


Urine Glucose (UA)    Normal


 


Urine Ketones    Negative


 


Urine Blood    1+ H


 


Urine Nitrate    Negative


 


Urine Bilirubin    Negative


 


Urine Urobilinogen    Normal


 


Ur Leukocyte Esterase    3+ H


 


Urine WBC (Auto)    62 H


 


Urine RBC (Auto)    2


 


Ur Squamous Epith Cells    2


 


Triple Phos Crystals    Occ H


 


Urine Bacteria    Rare


 


Blood Type   


 


Antibody Screen   














  11/09/17 11/09/17 11/10/17





  11:25 11:25 05:15


 


WBC   


 


RBC   


 


Hgb   


 


Hct   


 


MCV   


 


MCH   


 


MCHC   


 


RDW   


 


Plt Count   


 


MPV   


 


Neut % (Auto)   


 


Lymph % (Auto)   


 


Mono % (Auto)   


 


Eos % (Auto)   


 


Baso % (Auto)   


 


Neut #   


 


Lymph #   


 


Mono #   


 


Eos #   


 


Baso #   


 


Smear Path Review   


 


PT   


 


INR   


 


APTT   


 


Sodium  136  


 


Potassium  4.3  


 


Chloride  104  


 


Carbon Dioxide  22  


 


Anion Gap  15  


 


BUN  19  


 


Creatinine  1.5  


 


Est GFR ( Amer)  57  


 


Est GFR (Non-Af Amer)  47  


 


Random Glucose  101  


 


Calcium  8.2 L  


 


Phosphorus   


 


Magnesium   


 


Iron    30 L


 


TIBC    401


 


% Saturation    8 L


 


Total Bilirubin  0.6  


 


AST  24  


 


ALT  27  


 


Alkaline Phosphatase  47  


 


Total Protein  6.5  


 


Albumin  3.7  


 


Globulin  2.8  


 


Albumin/Globulin Ratio  1.3  


 


Lipase  120  


 


Urine Color   


 


Urine Clarity   


 


Urine pH   


 


Ur Specific Gravity   


 


Urine Protein   


 


Urine Glucose (UA)   


 


Urine Ketones   


 


Urine Blood   


 


Urine Nitrate   


 


Urine Bilirubin   


 


Urine Urobilinogen   


 


Ur Leukocyte Esterase   


 


Urine WBC (Auto)   


 


Urine RBC (Auto)   


 


Ur Squamous Epith Cells   


 


Triple Phos Crystals   


 


Urine Bacteria   


 


Blood Type   A POSITIVE 


 


Antibody Screen   Negative 














  11/10/17 11/10/17





  05:15 05:15


 


WBC  6.6 


 


RBC  3.72 L 


 


Hgb  7.9 L D 


 


Hct  25.5 L 


 


MCV  68.5 L D 


 


MCH  21.2 L 


 


MCHC  30.9 L 


 


RDW  26.5 H 


 


Plt Count  261 


 


MPV  8.9 


 


Neut % (Auto)  66.0 


 


Lymph % (Auto)  19.6 L 


 


Mono % (Auto)  12.1 H 


 


Eos % (Auto)  1.4 


 


Baso % (Auto)  0.9 


 


Neut #  4.4 


 


Lymph #  1.3 


 


Mono #  0.8 


 


Eos #  0.1 


 


Baso #  0.1 


 


Smear Path Review  


 


PT  


 


INR  


 


APTT  


 


Sodium   134


 


Potassium   3.8


 


Chloride   105


 


Carbon Dioxide   22


 


Anion Gap   11


 


BUN   14


 


Creatinine   1.3


 


Est GFR ( Amer)   > 60


 


Est GFR (Non-Af Amer)   55


 


Random Glucose   79


 


Calcium   7.8 L


 


Phosphorus   3.4


 


Magnesium   1.7


 


Iron  


 


TIBC  


 


% Saturation  


 


Total Bilirubin   1.2


 


AST   19


 


ALT   31


 


Alkaline Phosphatase   50


 


Total Protein   7.1


 


Albumin   3.4 L


 


Globulin   3.7


 


Albumin/Globulin Ratio   0.9 L


 


Lipase  


 


Urine Color  


 


Urine Clarity  


 


Urine pH  


 


Ur Specific Gravity  


 


Urine Protein  


 


Urine Glucose (UA)  


 


Urine Ketones  


 


Urine Blood  


 


Urine Nitrate  


 


Urine Bilirubin  


 


Urine Urobilinogen  


 


Ur Leukocyte Esterase  


 


Urine WBC (Auto)  


 


Urine RBC (Auto)  


 


Ur Squamous Epith Cells  


 


Triple Phos Crystals  


 


Urine Bacteria  


 


Blood Type  


 


Antibody Screen  











EKG/Cardiology Studies: 


Cardiology / EKG Studies





11/09/17 11:08


ELECTROCARDIOGRAM Stat 


   Comment: 


   Mode Of Transportation: BED


   Reason For Exam: Pain














Review of Systems





- Constitutional


Constitutional: absent: Fever, Chills, Sweats, Weakness





- EENT


Eyes: absent: Blurred Vision





- Cardiovascular


Cardiovascular: absent: Chest Pain, Dyspnea, Edema





- Respiratory


Respiratory: absent: Dyspnea





- Gastrointestinal


Gastrointestinal: absent: Abdominal Pain, Constipation, Diarrhea, Nausea, 

Vomiting





- Genitourinary


Genitourinary: absent: Difficulty Urinating





- Integumentary


Integumentary: absent: Rash





- Neurological


Neurological: absent: Dizziness, Headaches





Critical Care Progress Note





- Nutrition


Nutrition: 


 Nutrition











 Category Date Time Status


 


 Liquid Diet [DIET] Diets  11/09/17 Dinner Active














Assessment/Plan





- Assessment and Plan (Free Text)


Assessment: 


Patient is a 65 y/o M with PMHx of HTN, CAD, stents, COPD admitted for GI bleed 

with hemoglobin of 4.9





Neuro: intact, no acute issues





Cardio: hx CAD


-Cardio consult, Dr. Spivey, help appreciated 


-keep MAP > 65 


- hold home meds of aspirin and brilinta


-Carvedilol 6.25 mg po BID


-Cozaar 100 mg po daily


-Crestor 10 mg PO HS 


-Lasix 20 mg ivp daily





Pulm: hx COPD


-duonebs PRN 





Nephro: CKD, stage 2


-avoid nephrotoxic drugs


-hold ACEI





GI: chronic GI bleed


-GI consult, Dr. Price, help appreciated


-patient to go for endoscopy 


-Protonix drip 


-3 units of PRBCs given yesterday, HgB increased to 7.9, 1 more u of PRBCs 

given today 


-1,000 mg Calcium Chloride given once





:


-Flomax .4mg po daily 





Prophylaxis:


GI: protonix drip


DVT: SCDs, chemical prophylaxis contraindicated due to GI bleed

## 2017-11-11 LAB
ALBUMIN/GLOB SERPL: 0.9 {RATIO} (ref 1–2.1)
ALP SERPL-CCNC: 51 U/L (ref 38–126)
ALT SERPL-CCNC: 30 U/L (ref 21–72)
AST SERPL-CCNC: 23 U/L (ref 17–59)
BASOPHILS # BLD AUTO: 0.1 K/UL (ref 0–0.2)
BASOPHILS # BLD AUTO: 0.1 K/UL (ref 0–0.2)
BASOPHILS NFR BLD: 0.8 % (ref 0–2)
BASOPHILS NFR BLD: 0.8 % (ref 0–2)
BILIRUB SERPL-MCNC: 1 MG/DL (ref 0.2–1.3)
BUN SERPL-MCNC: 15 MG/DL (ref 9–20)
CALCIUM SERPL-MCNC: 8.4 MG/DL (ref 8.6–10.4)
CHLORIDE SERPL-SCNC: 102 MMOL/L (ref 98–107)
CO2 SERPL-SCNC: 23 MMOL/L (ref 22–30)
EOSINOPHIL # BLD AUTO: 0.2 K/UL (ref 0–0.7)
EOSINOPHIL # BLD AUTO: 0.3 K/UL (ref 0–0.7)
EOSINOPHIL NFR BLD: 2.5 % (ref 0–4)
EOSINOPHIL NFR BLD: 3 % (ref 0–4)
ERYTHROCYTE [DISTWIDTH] IN BLOOD BY AUTOMATED COUNT: 28 % (ref 11.5–14.5)
ERYTHROCYTE [DISTWIDTH] IN BLOOD BY AUTOMATED COUNT: 28.4 % (ref 11.5–14.5)
GLOBULIN SER-MCNC: 4.1 GM/DL (ref 2.2–3.9)
GLUCOSE SERPL-MCNC: 95 MG/DL (ref 75–110)
HCT VFR BLD CALC: 32.6 % (ref 35–51)
HCT VFR BLD CALC: 35.4 % (ref 35–51)
LYMPHOCYTES # BLD AUTO: 1.5 K/UL (ref 1–4.3)
LYMPHOCYTES # BLD AUTO: 1.5 K/UL (ref 1–4.3)
LYMPHOCYTES NFR BLD AUTO: 15.2 % (ref 20–40)
LYMPHOCYTES NFR BLD AUTO: 16.9 % (ref 20–40)
MAGNESIUM SERPL-MCNC: 1.7 MG/DL (ref 1.6–2.3)
MCH RBC QN AUTO: 22.9 PG (ref 27–31)
MCH RBC QN AUTO: 23.2 PG (ref 27–31)
MCHC RBC AUTO-ENTMCNC: 31.7 G/DL (ref 33–37)
MCHC RBC AUTO-ENTMCNC: 32.4 G/DL (ref 33–37)
MCV RBC AUTO: 71.7 FL (ref 80–94)
MCV RBC AUTO: 72.4 FL (ref 80–94)
MONOCYTES # BLD: 0.9 K/UL (ref 0–0.8)
MONOCYTES # BLD: 1 K/UL (ref 0–0.8)
MONOCYTES NFR BLD: 9.9 % (ref 0–10)
MONOCYTES NFR BLD: 9.9 % (ref 0–10)
NRBC BLD AUTO-RTO: 0.2 % (ref 0–2)
NRBC BLD AUTO-RTO: 0.2 % (ref 0–2)
PHOSPHATE SERPL-MCNC: 3.4 MG/DL (ref 2.5–4.5)
PLATELET # BLD: 258 K/UL (ref 130–400)
PLATELET # BLD: 278 K/UL (ref 130–400)
PMV BLD AUTO: 8.6 FL (ref 7.2–11.7)
PMV BLD AUTO: 8.8 FL (ref 7.2–11.7)
POTASSIUM SERPL-SCNC: 3.9 MMOL/L (ref 3.6–5.2)
PROT SERPL-MCNC: 7.6 G/DL (ref 6.3–8.3)
SODIUM SERPL-SCNC: 135 MMOL/L (ref 132–148)
WBC # BLD AUTO: 9 K/UL (ref 4.8–10.8)
WBC # BLD AUTO: 9.6 K/UL (ref 4.8–10.8)

## 2017-11-11 PROCEDURE — 0DB68ZX EXCISION OF STOMACH, VIA NATURAL OR ARTIFICIAL OPENING ENDOSCOPIC, DIAGNOSTIC: ICD-10-PCS | Performed by: INTERNAL MEDICINE

## 2017-11-11 RX ADMIN — PANTOPRAZOLE SODIUM SCH MG: 40 TABLET, DELAYED RELEASE ORAL at 09:58

## 2017-11-11 RX ADMIN — Medication SCH TAB: at 09:58

## 2017-11-11 RX ADMIN — PANTOPRAZOLE SODIUM SCH: 40 INJECTION, POWDER, FOR SOLUTION INTRAVENOUS at 02:00

## 2017-11-11 NOTE — CP.[email or web address].PN
Subjective





- Date & Time of Evaluation


Date of Evaluation: 11/11/17


Time of Evaluation: 17:00





- Subjective


Subjective: 





Patient has no SOB or chest pain. Had EGD performed this AM: hastritis and 

hiatal hernia with no active bleeding, no source of bleeding. Hgb: 11.2. 

Scheduled for a colonoscopy in AM by Dr Price.





Objective





- Vital Signs/Intake and Output


Vital Signs (last 24 hours): 


 











Temp Pulse Resp BP Pulse Ox


 


 98.1 F   76   20   132/73   99 


 


 11/11/17 20:00  11/11/17 21:00  11/11/17 21:00  11/11/17 21:00  11/11/17 21:00








Intake and Output: 


 











 11/11/17 11/12/17





 18:59 06:59


 


Intake Total 300 


 


Balance 300 














- Medications


Medications: 


 Current Medications





Acetaminophen (Tylenol 325mg Tab)  650 mg PO Q6 PRN


   PRN Reason: transfusion


   Last Admin: 11/10/17 09:09 Dose:  650 mg


Albuterol/Ipratropium (Duoneb 3 Mg/0.5 Mg (3 Ml) Ud)  3 ml INH RQ6 PRN


   PRN Reason: Shortness of Breath


Carvedilol (Coreg)  6.25 mg PO BID [facility]


   Last Admin: 11/11/17 18:01 Dose:  6.25 mg


Furosemide (Lasix)  20 mg IVP DAILY GRETCHEN


   Last Admin: 11/11/17 09:58 Dose:  20 mg


Losartan Potassium (Cozaar)  100 mg PO DAILY GRETCHEN


   Last Admin: 11/11/17 10:01 Dose:  100 mg


Multivitamins (Hexavitamin)  1 tab PO DAILY GRETCHEN


   Last Admin: 11/11/17 09:58 Dose:  1 tab


Pantoprazole Sodium (Protonix Ec Tab)  40 mg PO DAILY GRETCHEN


   Last Admin: 11/11/17 09:58 Dose:  40 mg


Rosuvastatin Calcium (Crestor)  10 mg PO HS [facility]


   Last Admin: 11/11/17 21:32 Dose:  10 mg


Tamsulosin HCl (Flomax)  0.4 mg PO DAILY [facility]


   Last Admin: 11/11/17 09:58 Dose:  0.4 mg











- Labs


Labs: 


 





 11/11/17 16:28 





 11/11/17 06:25 





 











PT  12.9 SECONDS (9.7-12.2)  H  11/09/17  11:25    


 


INR  1.1   11/09/17  11:25    


 


APTT  31 SECONDS (21-34)   11/09/17  11:25    














- Constitutional


Appears: No Acute Distress





- Head Exam


Head Exam: NORMAL INSPECTION





- Eye Exam


Eye Exam: Normal appearance





- ENT Exam


ENT Exam: Normal Exam





- Neck Exam


Neck Exam: Normal Inspection





- Respiratory Exam


Respiratory Exam: Clear to Ausculation Bilateral, NORMAL BREATHING PATTERN





- Cardiovascular Exam


Cardiovascular Exam: REGULAR RHYTHM, Murmur





- GI/Abdominal Exam


GI & Abdominal Exam: Soft, Normal Bowel Sounds





- Extremities Exam


Extremities Exam: Normal Inspection





- Back Exam


Back Exam: NORMAL INSPECTION





- Neurological Exam


Neurological Exam: Alert, Awake, Normal Gait, Oriented x3


Neuro motor strength exam: Left Upper Extremity: 5





- Psychiatric Exam


Psychiatric exam: Anxious





- Skin


Skin Exam: Dry, Intact, Normal Color, Warm





Assessment and Plan


(1) Severe anemia


Assessment & Plan: 


Hgb today:11.2


Status: Resolved   





(2) GI bleeding


Assessment & Plan: 


For a colonoscopy in AM.


Status: Acute   





(3) Coronary artery disease


Assessment & Plan: 


Stable so far


Status: Chronic

## 2017-11-11 NOTE — [email or web address]
Subjective





- Date & Time of Evaluation


Date of Evaluation: 11/10/17


Time of Evaluation: 19:00





- Subjective


Subjective: 





Patient has no complaint. VSS. Lungs clear. Heart : S1 S2 regular Hgb: 10.8 

after transfusion of 6 units pf PRC. For an EGD in AM.





Objective





- Vital Signs/Intake and Output


Vital Signs (last 24 hours): 


 











Temp Pulse Resp BP Pulse Ox


 


 98.5 F   72   20   118/62   98 


 


 11/10/17 20:00  11/10/17 20:50  11/10/17 20:50  11/10/17 20:50  11/10/17 20:50








Intake and Output: 


 











 11/10/17 11/11/17





 18:59 06:59


 


Intake Total 1095 


 


Output Total 2000 


 


Balance -905 














- Medications


Medications: 


 Current Medications





Acetaminophen (Tylenol 325mg Tab)  650 mg PO Q6 PRN


   PRN Reason: transfusion


   Last Admin: 11/10/17 09:09 Dose:  650 mg


Albuterol/Ipratropium (Duoneb 3 Mg/0.5 Mg (3 Ml) Ud)  3 ml INH RQ6 PRN


   PRN Reason: Shortness of Breath


Carvedilol (Coreg)  6.25 mg PO BID [facility]


   Last Admin: 11/10/17 18:20 Dose:  Not Given


Furosemide (Lasix)  20 mg IVP DAILY [facility]


   Last Admin: 11/10/17 09:09 Dose:  20 mg


Pantoprazole Sodium 80 mg/ (Dextrose)  100 mls @ 10 mls/hr IVPB .Q10H [facility]


   PRN Reason: 8 MG/HR


   Last Admin: 11/10/17 20:00 Dose:  10 mls/hr


Losartan Potassium (Cozaar)  100 mg PO DAILY [facility]


   Last Admin: 11/10/17 09:06 Dose:  Not Given


Multivitamins (Hexavitamin)  1 tab PO DAILY [facility]


   Last Admin: 11/10/17 09:09 Dose:  1 tab


Rosuvastatin Calcium (Crestor)  10 mg PO HS [facility]


   Last Admin: 11/10/17 21:25 Dose:  10 mg


Tamsulosin HCl (Flomax)  0.4 mg PO DAILY [facility]


   Last Admin: 11/10/17 09:09 Dose:  0.4 mg











- Labs


Labs: 


 





 11/10/17 17:37 





 11/10/17 05:15 





 











PT  12.9 SECONDS (9.7-12.2)  H  11/09/17  11:25    


 


INR  1.1   11/09/17  11:25    


 


APTT  31 SECONDS (21-34)   11/09/17  11:25    














- Constitutional


Appears: No Acute Distress





- Head Exam


Head Exam: NORMAL INSPECTION





- Eye Exam


Eye Exam: Normal appearance





- ENT Exam


ENT Exam: Normal Exam





- Neck Exam


Neck Exam: Normal Inspection





- Respiratory Exam


Respiratory Exam: Clear to Ausculation Bilateral, NORMAL BREATHING PATTERN





- Cardiovascular Exam


Cardiovascular Exam: REGULAR RHYTHM, Murmur





- GI/Abdominal Exam


GI & Abdominal Exam: Soft, Normal Bowel Sounds





- Rectal Exam


Rectal Exam: Deferred





-  Exam


 Exam: NORMAL INSPECTION





- Extremities Exam


Extremities Exam: Normal Inspection





- Back Exam


Back Exam: NORMAL INSPECTION





- Neurological Exam


Neurological Exam: Alert, Awake, Oriented x3





- Psychiatric Exam


Psychiatric exam: Anxious





- Skin


Skin Exam: Dry, Intact, Normal Color, Warm





Assessment and Plan


(1) Severe anemia


Status: Acute   





(2) GI bleeding


Status: Acute   





(3) Coronary artery disease


Assessment & Plan: 


Stable,so far.


Status: Chronic

## 2017-11-11 NOTE — CP.PCM.PN
Subjective





- Date & Time of Evaluation


Date of Evaluation: 11/11/17


Time of Evaluation: 08:51





- Subjective


Subjective: 





EGD: See Operative report and photos





NO evidence of active bleeding, ulcer or mass


Mild gastritis and small hiatal hernia found.





As patient has been off anti-platelet drugs, to decrease further cardiac risk, 

I will schedule for emergent colonoscopy tomorrow am. 


Following this antiplatelet or anti-coagulant drugs may be used. 


Can give Lovenox today as a bridge as well (one dose this am)


Continue Protonix prophylaxis on discharge.


Repeat labs





Objective





- Vital Signs/Intake and Output


Vital Signs (last 24 hours): 


 











Temp Pulse Resp BP Pulse Ox


 


 97.5 F L  68   18   125/66   99 


 


 11/11/17 08:00  11/11/17 08:10  11/11/17 08:10  11/11/17 08:10  11/11/17 08:10








Intake and Output: 


 











 11/11/17 11/11/17





 06:59 18:59


 


Intake Total 770 


 


Output Total 1150 


 


Balance -380 














- Medications


Medications: 


 Current Medications





Acetaminophen (Tylenol 325mg Tab)  650 mg PO Q6 PRN


   PRN Reason: transfusion


   Last Admin: 11/10/17 09:09 Dose:  650 mg


Albuterol/Ipratropium (Duoneb 3 Mg/0.5 Mg (3 Ml) Ud)  3 ml INH RQ6 PRN


   PRN Reason: Shortness of Breath


Carvedilol (Coreg)  6.25 mg PO BID [facility]


   Last Admin: 11/10/17 18:20 Dose:  Not Given


Furosemide (Lasix)  20 mg IVP DAILY [facility]


   Last Admin: 11/10/17 09:09 Dose:  20 mg


Pantoprazole Sodium 80 mg/ (Dextrose)  100 mls @ 10 mls/hr IVPB .Q10H [facility]


   PRN Reason: 8 MG/HR


   Last Admin: 11/11/17 02:00 Dose:  Not Given


Losartan Potassium (Cozaar)  100 mg PO DAILY [facility]


   Last Admin: 11/10/17 09:06 Dose:  Not Given


Multivitamins (Hexavitamin)  1 tab PO DAILY [facility]


   Last Admin: 11/10/17 09:09 Dose:  1 tab


Rosuvastatin Calcium (Crestor)  10 mg PO HS [facility]


   Last Admin: 11/10/17 21:25 Dose:  10 mg


Tamsulosin HCl (Flomax)  0.4 mg PO DAILY [facility]


   Last Admin: 11/10/17 09:09 Dose:  0.4 mg











- Labs


Labs: 


 





 11/11/17 06:23 





 11/11/17 06:25 





 











PT  12.9 SECONDS (9.7-12.2)  H  11/09/17  11:25    


 


INR  1.1   11/09/17  11:25    


 


APTT  31 SECONDS (21-34)   11/09/17  11:25    














Assessment and Plan


(1) Melena


Status: Acute   





(2) Anemia associated with acute blood loss


Status: Acute   





(3) H/O peptic ulcer


Status: Resolved   





(4) Coronary artery disease


Status: Chronic

## 2017-11-11 NOTE — CP.PCM.PN
Subjective





- Date & Time of Evaluation


Date of Evaluation: 11/11/17


Time of Evaluation: 07:35





- Subjective


Subjective: 





                                       Patient has no active bleeding. Hgb 

after 6 units of PRC 10.8 Abdominal p[ains 8is much lesser.. For EGD this AM.





Objective





- Vital Signs/Intake and Output


Vital Signs (last 24 hours): 


 











Temp Pulse Resp BP Pulse Ox


 


 98.4 F   68   17   123/66   98 


 


 11/11/17 04:00  11/11/17 06:50  11/11/17 06:50  11/11/17 06:50  11/11/17 06:50








Intake and Output: 


 











 11/11/17 11/11/17





 06:59 18:59


 


Intake Total 770 


 


Output Total 1150 


 


Balance -380 














- Medications


Medications: 


 Current Medications





Acetaminophen (Tylenol 325mg Tab)  650 mg PO Q6 PRN


   PRN Reason: transfusion


   Last Admin: 11/10/17 09:09 Dose:  650 mg


Albuterol/Ipratropium (Duoneb 3 Mg/0.5 Mg (3 Ml) Ud)  3 ml INH RQ6 PRN


   PRN Reason: Shortness of Breath


Carvedilol (Coreg)  6.25 mg PO BID [facility]


   Last Admin: 11/10/17 18:20 Dose:  Not Given


Furosemide (Lasix)  20 mg IVP DAILY [facility]


   Last Admin: 11/10/17 09:09 Dose:  20 mg


Pantoprazole Sodium 80 mg/ (Dextrose)  100 mls @ 10 mls/hr IVPB .Q10H [facility]


   PRN Reason: 8 MG/HR


   Last Admin: 11/11/17 02:00 Dose:  Not Given


Losartan Potassium (Cozaar)  100 mg PO DAILY [facility]


   Last Admin: 11/10/17 09:06 Dose:  Not Given


Multivitamins (Hexavitamin)  1 tab PO DAILY [facility]


   Last Admin: 11/10/17 09:09 Dose:  1 tab


Rosuvastatin Calcium (Crestor)  10 mg PO HS [facility]


   Last Admin: 11/10/17 21:25 Dose:  10 mg


Tamsulosin HCl (Flomax)  0.4 mg PO DAILY [facility]


   Last Admin: 11/10/17 09:09 Dose:  0.4 mg











- Labs


Labs: 


 





 11/11/17 06:23 





 11/11/17 06:25 





 











PT  12.9 SECONDS (9.7-12.2)  H  11/09/17  11:25    


 


INR  1.1   11/09/17  11:25    


 


APTT  31 SECONDS (21-34)   11/09/17  11:25    














- Constitutional


Appears: Non-toxic, No Acute Distress





- Head Exam


Head Exam: ATRAUMATIC, NORMAL INSPECTION, NORMOCEPHALIC





- Eye Exam


Eye Exam: Normal appearance


Pupil Exam: PERRL





- ENT Exam


ENT Exam: Normal External Ear Exam





- Neck Exam


Neck Exam: Full ROM





- Respiratory Exam


Respiratory Exam: Clear to Ausculation Bilateral, NORMAL BREATHING PATTERN





- Cardiovascular Exam


Cardiovascular Exam: REGULAR RHYTHM, +S1, +S2





- GI/Abdominal Exam


GI & Abdominal Exam: Soft, Normal Bowel Sounds





- Rectal Exam


Rectal Exam: Deferred





- Back Exam


Back Exam: Full ROM, NORMAL INSPECTION





- Neurological Exam


Neurological Exam: Alert, Awake, Oriented x3





- Psychiatric Exam


Psychiatric exam: Normal Affect, Normal Mood





- Skin


Skin Exam: Dry, Intact, Normal Color





Assessment and Plan


(1) Anemia


Status: Acute   





(2) Anemia associated with acute blood loss


Status: Acute   





(3) GI bleeding


Status: Acute   





(4) Melena


Status: Acute   





(5) Severe anemia


Status: Acute   





(6) CAD (coronary artery disease)


Status: Chronic   





(7) Congestive heart failure (CHF)


Status: Chronic   





(8) Pre-diabetes


Status: Chronic   





(9) Hyperlipidemia LDL goal <100


Status: Chronic   





(10) Hyperlipidemia LDL goal <70


Status: Chronic   





- Assessment and Plan (Free Text)


Plan: 





                                                        Plan:








                                    EGD this AM.





                                   Continue monitoring  for bleeding and 

cardiac  status.

## 2017-11-12 LAB
ALBUMIN/GLOB SERPL: 0.9 {RATIO} (ref 1–2.1)
ALP SERPL-CCNC: 60 U/L (ref 38–126)
ALT SERPL-CCNC: 24 U/L (ref 21–72)
AST SERPL-CCNC: 18 U/L (ref 17–59)
BILIRUB SERPL-MCNC: 1 MG/DL (ref 0.2–1.3)
BUN SERPL-MCNC: 14 MG/DL (ref 9–20)
CALCIUM SERPL-MCNC: 8.2 MG/DL (ref 8.6–10.4)
CHLORIDE SERPL-SCNC: 99 MMOL/L (ref 98–107)
CO2 SERPL-SCNC: 23 MMOL/L (ref 22–30)
ERYTHROCYTE [DISTWIDTH] IN BLOOD BY AUTOMATED COUNT: 29.2 % (ref 11.5–14.5)
GLOBULIN SER-MCNC: 4.3 GM/DL (ref 2.2–3.9)
GLUCOSE SERPL-MCNC: 86 MG/DL (ref 75–110)
HCT VFR BLD CALC: 35 % (ref 35–51)
MCH RBC QN AUTO: 23.4 PG (ref 27–31)
MCHC RBC AUTO-ENTMCNC: 32.1 G/DL (ref 33–37)
MCV RBC AUTO: 72.8 FL (ref 80–94)
PLATELET # BLD: 265 K/UL (ref 130–400)
PMV BLD AUTO: 8.8 FL (ref 7.2–11.7)
POTASSIUM SERPL-SCNC: 3.3 MMOL/L (ref 3.6–5.2)
PROT SERPL-MCNC: 8.1 G/DL (ref 6.3–8.3)
SODIUM SERPL-SCNC: 135 MMOL/L (ref 132–148)
WBC # BLD AUTO: 11 K/UL (ref 4.8–10.8)

## 2017-11-12 PROCEDURE — 0DJD8ZZ INSPECTION OF LOWER INTESTINAL TRACT, VIA NATURAL OR ARTIFICIAL OPENING ENDOSCOPIC: ICD-10-PCS | Performed by: INTERNAL MEDICINE

## 2017-11-12 RX ADMIN — Medication SCH TAB: at 11:43

## 2017-11-12 RX ADMIN — PANTOPRAZOLE SODIUM SCH MG: 40 TABLET, DELAYED RELEASE ORAL at 11:43

## 2017-11-12 NOTE — CP.PCM.PN
Subjective





- Date & Time of Evaluation


Date of Evaluation: 11/12/17


Time of Evaluation: 07:15





- Subjective


Subjective: 





                                            Status post Upper endoscopy. 

patient tolerated the procedure very well. Findings...mild gastritis. No 





             Source of upper GI bleeding. Patient to heve an emergent 

colonoscopy this AM.





Objective





- Vital Signs/Intake and Output


Vital Signs (last 24 hours): 


 











Temp Pulse Resp BP Pulse Ox


 


 98.2 F   92 H  17   128/73   98 


 


 11/12/17 04:00  11/12/17 05:50  11/12/17 05:50  11/12/17 05:40  11/12/17 05:40











- Medications


Medications: 


 Current Medications





Acetaminophen (Tylenol 325mg Tab)  650 mg PO Q6 PRN


   PRN Reason: transfusion


   Last Admin: 11/10/17 09:09 Dose:  650 mg


Albuterol/Ipratropium (Duoneb 3 Mg/0.5 Mg (3 Ml) Ud)  3 ml INH RQ6 PRN


   PRN Reason: Shortness of Breath


Carvedilol (Coreg)  6.25 mg PO BID [facility]


   Last Admin: 11/11/17 18:01 Dose:  6.25 mg


Furosemide (Lasix)  20 mg IVP DAILY [facility]


   Last Admin: 11/11/17 09:58 Dose:  20 mg


Losartan Potassium (Cozaar)  100 mg PO DAILY [facility]


   Last Admin: 11/11/17 10:01 Dose:  100 mg


Multivitamins (Hexavitamin)  1 tab PO DAILY [facility]


   Last Admin: 11/11/17 09:58 Dose:  1 tab


Pantoprazole Sodium (Protonix Ec Tab)  40 mg PO DAILY [facility]


   Last Admin: 11/11/17 09:58 Dose:  40 mg


Rosuvastatin Calcium (Crestor)  10 mg PO HS [facility]


   Last Admin: 11/11/17 21:32 Dose:  10 mg


Tamsulosin HCl (Flomax)  0.4 mg PO DAILY [facility]


   Last Admin: 11/11/17 09:58 Dose:  0.4 mg











- Labs


Labs: 


 





 11/12/17 06:35 





 11/12/17 06:35 





 











PT  12.9 SECONDS (9.7-12.2)  H  11/09/17  11:25    


 


INR  1.1   11/09/17  11:25    


 


APTT  31 SECONDS (21-34)   11/09/17  11:25    














- Constitutional


Appears: Well, No Acute Distress





- Head Exam


Head Exam: ATRAUMATIC, NORMAL INSPECTION, NORMOCEPHALIC





- Eye Exam


Eye Exam: Normal appearance


Pupil Exam: PERRL





- ENT Exam


ENT Exam: Mucous Membranes Moist





- Neck Exam


Neck Exam: Full ROM





- Respiratory Exam


Respiratory Exam: Clear to Ausculation Bilateral, NORMAL BREATHING PATTERN





- Cardiovascular Exam


Cardiovascular Exam: REGULAR RHYTHM, +S1, +S2





- GI/Abdominal Exam


GI & Abdominal Exam: Soft, Normal Bowel Sounds





- Rectal Exam


Rectal Exam: Deferred





- Extremities Exam


Extremities Exam: Full ROM





- Back Exam


Back Exam: Full ROM, NORMAL INSPECTION





- Neurological Exam


Neurological Exam: Alert, Awake, Oriented x3





- Psychiatric Exam


Psychiatric exam: Normal Affect, Normal Mood





- Skin


Skin Exam: Dry, Intact, Normal Color, Warm





Assessment and Plan


(1) Anemia


Status: Acute   





(2) Anemia associated with acute blood loss


Status: Acute   





(3) GI bleeding


Status: Acute   





(4) Melena


Status: Acute   





(5) Severe anemia


Status: Resolved   





(6) CAD (coronary artery disease)


Status: Chronic   





(7) Congestive heart failure (CHF)


Status: Chronic   





(8) Pre-diabetes


Status: Chronic   





(9) Hyperlipidemia LDL goal <100


Status: Chronic   





(10) Hyperlipidemia LDL goal <70


Status: Chronic   





- Assessment and Plan (Free Text)


Plan: 





                                     Plan:








                 For colonoscopy this AM.





                 Resume Brilinta and ASA post colonoscopy.

## 2017-11-12 NOTE — CP.PCM.PN
Subjective





- Date & Time of Evaluation


Date of Evaluation: 11/12/17


Time of Evaluation: 09:37





- Subjective


Subjective: 





Colonoscopy note: See endoscopy report and photos





Mild left sided diverticulosis


Internal hemorrhoids


NO bleeding, polyps, masses or AVMs. No fresh or old blood.





Resume [place] and ASA today and observe for active bleeding


Consider referral to outside Gastroenterologist for Pillcam/small bowel study 

if bleeding or anemia recurs


Continue PPI prophylaxis with the anti-platelet Rx.


Advance diet and possible discharge in am if stable.


Will follow AS NEEDED.


Can see me in the office in 2 weeks as well. 





Objective





- Vital Signs/Intake and Output


Vital Signs (last 24 hours): 


 











Temp Pulse Resp BP Pulse Ox


 


 98.6 F   91 H  19   126/73   99 


 


 11/12/17 08:00  11/12/17 07:50  11/12/17 07:50  11/12/17 07:46  11/12/17 07:50








Intake and Output: 


 











 11/12/17 11/12/17





 06:59 18:59


 


Intake Total 750 


 


Balance 750 














- Medications


Medications: 


 Current Medications





Acetaminophen (Tylenol 325mg Tab)  650 mg PO Q6 PRN


   PRN Reason: transfusion


   Last Admin: 11/10/17 09:09 Dose:  650 mg


Albuterol/Ipratropium (Duoneb 3 Mg/0.5 Mg (3 Ml) Ud)  3 ml INH RQ6 PRN


   PRN Reason: Shortness of Breath


Carvedilol (Coreg)  6.25 mg PO BID [facility]


   Last Admin: 11/12/17 08:28 Dose:  6.25 mg


Furosemide (Lasix)  20 mg IVP DAILY [facility]


   Last Admin: 11/11/17 09:58 Dose:  20 mg


Losartan Potassium (Cozaar)  100 mg PO DAILY [facility]


   Last Admin: 11/11/17 10:01 Dose:  100 mg


Multivitamins (Hexavitamin)  1 tab PO DAILY [facility]


   Last Admin: 11/11/17 09:58 Dose:  1 tab


Pantoprazole Sodium (Protonix Ec Tab)  40 mg PO DAILY [facility]


   Last Admin: 11/11/17 09:58 Dose:  40 mg


Rosuvastatin Calcium (Crestor)  10 mg PO HS [facility]


   Last Admin: 11/11/17 21:32 Dose:  10 mg


Tamsulosin HCl (Flomax)  0.4 mg PO DAILY [facility]


   Last Admin: 11/11/17 09:58 Dose:  0.4 mg











- Labs


Labs: 


 





 11/12/17 06:35 





 11/12/17 06:35 





 











PT  12.9 SECONDS (9.7-12.2)  H  11/09/17  11:25    


 


INR  1.1   11/09/17  11:25    


 


APTT  31 SECONDS (21-34)   11/09/17  11:25    














Assessment and Plan


(1) Melena


Status: Acute   





(2) Anemia associated with acute blood loss


Status: Acute   





(3) H/O peptic ulcer


Status: Resolved   





(4) Coronary artery disease


Status: Chronic

## 2017-11-13 VITALS
DIASTOLIC BLOOD PRESSURE: 71 MMHG | SYSTOLIC BLOOD PRESSURE: 105 MMHG | OXYGEN SATURATION: 98 % | HEART RATE: 89 BPM | RESPIRATION RATE: 17 BRPM

## 2017-11-13 VITALS — TEMPERATURE: 98.3 F

## 2017-11-13 LAB
BUN SERPL-MCNC: 22 MG/DL (ref 9–20)
CALCIUM SERPL-MCNC: 8.4 MG/DL (ref 8.6–10.4)
CHLORIDE SERPL-SCNC: 99 MMOL/L (ref 98–107)
CO2 SERPL-SCNC: 25 MMOL/L (ref 22–30)
GLUCOSE SERPL-MCNC: 103 MG/DL (ref 75–110)
POTASSIUM SERPL-SCNC: 3.7 MMOL/L (ref 3.6–5.2)
SODIUM SERPL-SCNC: 133 MMOL/L (ref 132–148)

## 2017-11-13 RX ADMIN — Medication SCH TAB: at 09:14

## 2017-11-13 RX ADMIN — PANTOPRAZOLE SODIUM SCH MG: 40 TABLET, DELAYED RELEASE ORAL at 09:14

## 2017-11-13 NOTE — CP.PCM.DIS
Provider





- Provider


Date of Admission: 


11/09/17 12:05





Attending physician: 


Kiley Burnham MD





Time Spent in preparation of Discharge (in minutes): 60





Diagnosis





- Discharge Diagnosis


(1) Anemia


Status: Acute   





(2) Anemia associated with acute blood loss


Status: Acute   Priority: High   Onset Date: ~11/07/17   





(3) GI bleeding


Status: Acute   





(4) Melena


Status: Acute   Priority: High   





(5) Severe anemia


Status: Resolved   





(6) CAD (coronary artery disease)


Status: Chronic   Priority: High   





(7) Congestive heart failure (CHF)


Status: Chronic   





(8) Pre-diabetes


Status: Chronic   





(9) Hyperlipidemia LDL goal <100


Status: Chronic   





(10) Hyperlipidemia LDL goal <70


Status: Chronic   





Hospital Course





- Lab Results


Lab Results: 


 Micro Results





11/09/17 08:30   Naris   MRSA Culture - Final


                            MRSA NOT DETECTED





 Most Recent Lab Values











WBC  11.0 K/uL (4.8-10.8)  H  11/12/17  06:35    


 


RBC  4.81 Mil/uL (4.40-5.90)   11/12/17  06:35    


 


Hgb  11.2 g/dL (12.0-18.0)  L  11/12/17  06:35    


 


Hct  35.0 % (35.0-51.0)   11/12/17  06:35    


 


MCV  72.8 fL (80.0-94.0)  L  11/12/17  06:35    


 


MCH  23.4 pg (27.0-31.0)  L  11/12/17  06:35    


 


MCHC  32.1 g/dL (33.0-37.0)  L  11/12/17  06:35    


 


RDW  29.2 % (11.5-14.5)  H  11/12/17  06:35    


 


Plt Count  265 K/uL (130-400)   11/12/17  06:35    


 


MPV  8.8 fL (7.2-11.7)   11/12/17  06:35    


 


Neut % (Auto)  71.6 % (50.0-75.0)   11/11/17  16:28    


 


Lymph % (Auto)  15.2 % (20.0-40.0)  L  11/11/17  16:28    


 


Mono % (Auto)  9.9 % (0.0-10.0)   11/11/17  16:28    


 


Eos % (Auto)  2.5 % (0.0-4.0)   11/11/17  16:28    


 


Baso % (Auto)  0.8 % (0.0-2.0)   11/11/17  16:28    


 


Neut #  6.9 K/uL (1.8-7.0)   11/11/17  16:28    


 


Lymph #  1.5 K/uL (1.0-4.3)   11/11/17  16:28    


 


Mono #  1.0 K/uL (0.0-0.8)  H  11/11/17  16:28    


 


Eos #  0.2 K/uL (0.0-0.7)   11/11/17  16:28    


 


Baso #  0.1 K/uL (0.0-0.2)   11/11/17  16:28    


 


Smear Path Review     11/09/17  11:25    


 


PT  12.9 SECONDS (9.7-12.2)  H  11/09/17  11:25    


 


INR  1.1   11/09/17  11:25    


 


APTT  31 SECONDS (21-34)   11/09/17  11:25    


 


Sodium  135 mmol/L (132-148)   11/12/17  06:35    


 


Potassium  3.3 mmol/L (3.6-5.2)  L  11/12/17  06:35    


 


Chloride  99 mmol/L ()   11/12/17  06:35    


 


Carbon Dioxide  23 mmol/L (22-30)   11/12/17  06:35    


 


Anion Gap  16  (10-20)   11/12/17  06:35    


 


BUN  14 mg/dL (9-20)   11/12/17  06:35    


 


Creatinine  1.3 mg/dL (0.8-1.5)   11/12/17  06:35    


 


Est GFR ( Amer)  > 60   11/12/17  06:35    


 


Est GFR (Non-Af Amer)  55   11/12/17  06:35    


 


Random Glucose  86 mg/dL ()   11/12/17  06:35    


 


Calcium  8.2 mg/dl (8.6-10.4)  L  11/12/17  06:35    


 


Phosphorus  3.4 mg/dL (2.5-4.5)   11/11/17  06:25    


 


Magnesium  1.7 mg/dL (1.6-2.3)   11/11/17  06:25    


 


Iron  30 ug/dL ()  L  11/10/17  05:15    


 


TIBC  401 ug/dL (250-450)   11/10/17  05:15    


 


% Saturation  8  (20-55)  L  11/10/17  05:15    


 


Total Bilirubin  1.0 mg/dL (0.2-1.3)   11/12/17  06:35    


 


AST  18 U/L (17-59)   11/12/17  06:35    


 


ALT  24 U/L (21-72)   11/12/17  06:35    


 


Alkaline Phosphatase  60 U/L ()   11/12/17  06:35    


 


Total Protein  8.1 g/dL (6.3-8.3)   11/12/17  06:35    


 


Albumin  3.9 g/dL (3.5-5.0)   11/12/17  06:35    


 


Globulin  4.3 gm/dL (2.2-3.9)  H  11/12/17  06:35    


 


Albumin/Globulin Ratio  0.9  (1.0-2.1)  L  11/12/17  06:35    


 


Lipase  120 U/L ()   11/09/17  11:25    


 


Urine Color  Yellow  (YELLOW)   11/09/17  11:25    


 


Urine Clarity  Hazy  (Clear)   11/09/17  11:25    


 


Urine pH  8.0  (5.0-8.0)   11/09/17  11:25    


 


Ur Specific Gravity  1.014  (1.003-1.030)   11/09/17  11:25    


 


Urine Protein  1+ mg/dL (NEGATIVE)  H  11/09/17  11:25    


 


Urine Glucose (UA)  Normal mg/dL (Normal)   11/09/17  11:25    


 


Urine Ketones  Negative mg/dL (NEGATIVE)   11/09/17  11:25    


 


Urine Blood  1+  (NEGATIVE)  H  11/09/17  11:25    


 


Urine Nitrate  Negative  (NEGATIVE)   11/09/17  11:25    


 


Urine Bilirubin  Negative  (NEGATIVE)   11/09/17  11:25    


 


Urine Urobilinogen  Normal mg/dL (0.2-1.0)   11/09/17  11:25    


 


Ur Leukocyte Esterase  3+ Ray/uL (Negative)  H  11/09/17  11:25    


 


Urine WBC (Auto)  62 /hpf (0-5)  H  11/09/17  11:25    


 


Urine RBC (Auto)  2 /hpf (0-3)   11/09/17  11:25    


 


Ur Squamous Epith Cells  2 /hpf (0-5)   11/09/17  11:25    


 


Triple Phos Crystals  Occ /hpf (<OCC)  H  11/09/17  11:25    


 


Urine Bacteria  Rare  (<OCC)   11/09/17  11:25    


 


Blood Type  A POSITIVE   11/09/17  11:25    


 


Antibody Screen  Negative   11/09/17  11:25    














- Hospital Course


Hospital Course: 





                                   Admitted this 66 years old male from the ER 

beacuse of severe anemia of HGB 4.8. Patient was seen by DR. Spivey





            in his office the day before and he noticed that his HGB was 5.00 

Patient claims that about a week or two weeks prior he was feelin bloated and 

he 





           noticed that his BM was black in color. No nausea or vomting 

associated.No obvious abdominal pains.





- Date & Time of H&P


Date of H&P: 11/13/17


Time of H&P: 10:50





Discharge Exam





- Head Exam


Head Exam: ATRAUMATIC, NORMAL INSPECTION, NORMOCEPHALIC





- Eye Exam


Eye Exam: EOMI


Pupil Exam: NORMAL ACCOMODATION, PERRL





- ENT Exam


ENT Exam: Normal External Ear Exam





- Neck Exam


Neck exam: Full Rom





- Respiratory Exam


Respiratory Exam: Accessory Muscle Use, NORMAL BREATHING PATTERN, UNREMARKABLE





- Cardiovascular Exam


Cardiovascular Exam: REGULAR RHYTHM, +S1, +S2





- GI/Abdominal Exam


GI & Abdominal Exam: Normal Bowel Sounds, Soft, Unremarkable





- Rectal Exam


Rectal Exam: Deferred





- Neurological Exam


Neurological exam: Alert, Normal Gait, Oriented x3





- Psychiatric Exam


Psychiatric exam: Normal Affect, Normal Mood





- Skin


Skin Exam: Dry, Intact, Warm





Discharge Plan





- Follow Up Plan


Condition: STABLE


Disposition: HOME/ ROUTINE


Patient education suggested?: No


Instructions:  Anemia (ED)





Clinical Quality Measures





- CQM - Stroke


Statin prescribed: Yes





- CQM - VTE


Did patient receive overlap therapy during hosptialization?: No





- CQM - Heart Failure


Beta-Blocker Prescribed: Carvedilol


Will be discharged to: Home


Follow Up Date (must be within 7 days from discharge): 11/21/17


Follow Up Time: 02:00





- Date & Time of Discharge Summary


Date of Discharge Summary: 11/13/17


Time of Discharge Summary: 10:55

## 2017-11-14 NOTE — CARD
--------------- APPROVED REPORT --------------





EKG Measurement

Heart [id number]

[id number]

[id number]

[id number]

[id number]



<Conclusion>

Normal sinus rhythm

Possible Left atrial enlargement

Left ventricular hypertrophy

Nonspecific T wave abnormality

Abnormal ECG

## 2018-08-06 ENCOUNTER — HOSPITAL ENCOUNTER (INPATIENT)
Dept: HOSPITAL 31 - C.SDS | Age: 68
LOS: 2 days | Discharge: HOME | DRG: 713 | End: 2018-08-08
Attending: LEGAL MEDICINE | Admitting: LEGAL MEDICINE
Payer: MEDICARE

## 2018-08-06 VITALS — RESPIRATION RATE: 20 BRPM

## 2018-08-06 VITALS — BODY MASS INDEX: 27.4 KG/M2

## 2018-08-06 DIAGNOSIS — N40.0: Primary | ICD-10-CM

## 2018-08-06 DIAGNOSIS — I25.10: ICD-10-CM

## 2018-08-06 DIAGNOSIS — E11.22: ICD-10-CM

## 2018-08-06 DIAGNOSIS — N20.9: ICD-10-CM

## 2018-08-06 DIAGNOSIS — N32.89: ICD-10-CM

## 2018-08-06 DIAGNOSIS — Z95.5: ICD-10-CM

## 2018-08-06 DIAGNOSIS — H40.9: ICD-10-CM

## 2018-08-06 DIAGNOSIS — N18.9: ICD-10-CM

## 2018-08-06 DIAGNOSIS — I13.0: ICD-10-CM

## 2018-08-06 DIAGNOSIS — I50.9: ICD-10-CM

## 2018-08-06 DIAGNOSIS — D64.9: ICD-10-CM

## 2018-08-06 DIAGNOSIS — Z87.891: ICD-10-CM

## 2018-08-06 PROCEDURE — 0TBB8ZZ EXCISION OF BLADDER, VIA NATURAL OR ARTIFICIAL OPENING ENDOSCOPIC: ICD-10-PCS | Performed by: UROLOGY

## 2018-08-06 PROCEDURE — 0VB08ZZ EXCISION OF PROSTATE, VIA NATURAL OR ARTIFICIAL OPENING ENDOSCOPIC: ICD-10-PCS | Performed by: UROLOGY

## 2018-08-06 PROCEDURE — 0T778DZ DILATION OF LEFT URETER WITH INTRALUMINAL DEVICE, VIA NATURAL OR ARTIFICIAL OPENING ENDOSCOPIC: ICD-10-PCS | Performed by: UROLOGY

## 2018-08-06 RX ADMIN — CEFOXITIN SODIUM SCH MLS/HR: 1 INJECTION, SOLUTION INTRAVENOUS at 16:37

## 2018-08-06 RX ADMIN — DEXTROSE AND SODIUM CHLORIDE SCH MLS/HR: 5; 450 INJECTION, SOLUTION INTRAVENOUS at 12:45

## 2018-08-06 NOTE — RAD
Date of service: 



08/06/2018



PROCEDURE:  Intraoperative fluoroscopy



HISTORY:

HEMATURIA AND UROLITHIASIS



COMPARISON:

Not available



TECHNIQUE:

Intraoperative fluoroscopy was provided for left ureteral stent 

insertion. Total time of fluoroscopy was 78.5 seconds.  Cumulative 

dose was 1.16 mGy meters squared. 



FINDINGS:

Multiple fluoroscopic spot films are submitted demonstrating 

sequential images during left ureteral stent insertion.



IMPRESSION:

Fluoroscopy provided.

## 2018-08-07 LAB
BUN SERPL-MCNC: 15 MG/DL (ref 9–20)
CALCIUM SERPL-MCNC: 8 MG/DL (ref 8.6–10.4)
ERYTHROCYTE [DISTWIDTH] IN BLOOD BY AUTOMATED COUNT: 19.8 % (ref 11.5–14.5)
GFR NON-AFRICAN AMERICAN: > 60
HGB BLD-MCNC: 10 G/DL (ref 12–18)
MCH RBC QN AUTO: 25.3 PG (ref 27–31)
MCHC RBC AUTO-ENTMCNC: 32.4 G/DL (ref 33–37)
MCV RBC AUTO: 78 FL (ref 80–94)
PLATELET # BLD: 162 K/UL (ref 130–400)
PMV BLD AUTO: 9.5 FL (ref 7.2–11.7)
RBC # BLD AUTO: 3.97 MIL/UL (ref 4.4–5.9)
WBC # BLD AUTO: 7.5 K/UL (ref 4.8–10.8)

## 2018-08-07 RX ADMIN — CEFOXITIN SODIUM SCH MLS/HR: 1 INJECTION, SOLUTION INTRAVENOUS at 00:09

## 2018-08-07 RX ADMIN — DEXTROSE AND SODIUM CHLORIDE SCH MLS/HR: 5; 450 INJECTION, SOLUTION INTRAVENOUS at 22:05

## 2018-08-07 RX ADMIN — CEFOXITIN SODIUM SCH MLS/HR: 1 INJECTION, SOLUTION INTRAVENOUS at 08:48

## 2018-08-07 RX ADMIN — CEFOXITIN SODIUM SCH MLS/HR: 1 INJECTION, SOLUTION INTRAVENOUS at 17:05

## 2018-08-07 RX ADMIN — ENOXAPARIN SODIUM SCH MG: 40 INJECTION SUBCUTANEOUS at 22:02

## 2018-08-07 RX ADMIN — DEXTROSE AND SODIUM CHLORIDE SCH MLS/HR: 5; 450 INJECTION, SOLUTION INTRAVENOUS at 05:30

## 2018-08-07 NOTE — OP
Copied To:  Glory Oakes MD

Attending MD:  Glory Oakes MD



UROLOGY OPERATIVE REPORT



PROCEDURE DATE:  08/06/2018



PREOPERATIVE DIAGNOSES:  Hematuria and urolithiasis.



POSTOPERATIVE DIAGNOSES:  Hematuria and urolithiasis, benign prostatic

hyperplasia, bladder cancer.



PROCEDURE:  Cystoscopy, transurethral resection of bladder tumor,

transurethral action of bladder tumor at bladder neck, transurethral

resection of the prostate, insertion of left ureteral stent, and left

retrograde ureteral pyelogram.



Procedure was formed under video endoscopic control as well as under

fluoroscopic control.



Procedure as follows:  The patient received perioperative antibiotics.  The

patient was placed in lithotomy position.  Genitalia prepped and sterilely.



 from the abdomen was obtained.  There was noted to be a branching

calculus overlying the left renal shadow.  The calculus involved the renal

pelvis and the lower pole collecting system.



A 22-Polish cystoscope sheath was introduced under direct vision.  Urethra,

prostate, bladder were inspected with a 30 degree and 70 degree lens.



FINDINGS.  There was no stricture in the anterior urethra.  There was

evidence of trilobar prostatic hypertrophy.  Prostatic urethra was

occlusive.  There was significant intravesical intrusion of the prostate

into the bladder.  The prostatic enlargement obscured the trigone and

ureteral orifices.



There was noted to be a papillary bladder tumor involving the left floor of

the bladder.  In addition, there was a papillary bladder tumor involving

the bladder neck and prostate.



There was no bladder stone identified.  There was moderate bladder

trabeculation.



The cystoscope sheath removed.  A 26-Polish continuous flow resectoscope

sheath was introduced under direct vision with the visual obturator.



The urine had been sent for bacteriologic examination.



The resectoscope was inserted.



Resection of the bladder tumor on the left lobe was performed.  Fulguration

was performed with electrocautery.  Specimens sent for pathologic

examination.



Resection of the papillary tumor of the left bladder neck was then

performed.  Specimen sent for pathologic examination.



Resection of the intravesical portion of the left lobe of the prostate was

performed to gain access to the ureteral orifice.  The prostate

intravesical portion was resected.  The resection also involves the

proximal prostate.



The specimens were removed using the Sigel scientific evacuator.



Hemostasis were achieved during resection.  Hemostasis was complete.



The resectoscope sheath removed.



A 22-Polish cystoscope sheath was reintroduced.



Further inspection now revealed the presence of the left ureteral orifice.



A 0.035 inch guidewire was inserted into the orifice.  However, the

guidewire could not advance for proper at approximately 2 to 3 cm.  The

guidewire coiled.  There was noted to be some deviation and curving over

the course of the ureter.



An open-ended catheter was inserted.  A Jenkins angled tip open-end

catheter was inserted into the orifice.  Iodinated contrast dye was

instilled.  The ureter was identified.  There was a loop within the ureter.



Simultaneous manipulation of the guidewire was necessary to advance beyond

the loop and strained the loop.  Thereafter, the open-ended catheter was

able to be inserted proximally.  The guidewire was exchanged for a sensor

wire.  A standard tipped open-ended catheter was inserted up to level of

kidney.  Retrograde pyelogram was performed.  The filling defect of the

stone was now better seen.



The guidewire was reinserted.  A 6-Polish multi-length stent was inserted

over the guidewire.  Proper stent position was confirmed with fluoroscopy

and endoscopy.  The guidewire was removed.  Stent was left in place.



The bladder was reinspected.  Hemostasis was complete both at the sides of

the bladder tumor resection as well as the prostatic resection.



The cystoscope and sheath removed.  Seo catheter was inserted.  Bladder

drainage was clear.



Exam under anesthesia was performed.  There was no abnormal pelvic mass

fixation or induration.  Prostate was supple and smooth.



The patient tolerated procedure without complication.







__________________________________________

Glory Oakes MD



cc:  Dr. Spivey and Dr. Burnham.



DD:  08/07/2018 6:47:23

DT:  08/07/2018 6:50:50

Job # [id number]

## 2018-08-07 NOTE — CP.PCM.HP
History of Present Illness





- History of Present Illness


History of Present Illness: 





                            I was called by Dr. Spivey last night that this 

patient is in the hospital and had a urological procedure done by Dr. Oakes 





         on 8/7/2018. I saw the patient today and is doing well post-op. No 

more blood in the urine since last night and is not complaining of pains.





         Heart rate is good, no ectopies. 





     





Present on Admission





- Present on Admission


Any Indicators Present on Admission: No





Review of Systems





- Genitourinary


Genitourinary: As Per HPI





Past Patient History





- Infectious Disease


Hx of Infectious Diseases: None





- Tetanus Immunizations


Tetanus Immunization: Unknown





- Past Medical History & Family History


Past Medical History?: Yes





- Past Social History


Smoking Status: Former Smoker


Alcohol: None


Drugs: Denies


Domestic Violence: Negative





- CARDIAC


Hx Cardiac Disorders: Yes


Hx Angina: Yes


Hx Cardia Arrhythmia: Yes (Ventricular fibrillations x 2 in the past with acute 

MI's.)


Hx Congestive Heart Failure: Yes


Hx Heart Attack: Yes


Hx Hypercholesterolemia: Yes


Hx Hypertension: Yes


Hx Peripheral Edema: Yes (not at present)


Other/Comment: life vest one month





- PULMONARY


Hx Respiratory Disorders: Yes


Hx Pneumonia: Yes





- NEUROLOGICAL


Hx Neurological Disorder: No





- HEENT


Hx HEENT Problems: Yes


Hx Deafness: Yes (HEARING AIDS)


Hx Glaucoma: Yes





- RENAL


Hx Chronic Kidney Disease: Yes


Hx Kidney Stones: Yes


Other/Comment: Perforated bladder 2015





- ENDOCRINE/METABOLIC


Hx Endocrine Disorders: Yes (Pre diabetes/no medication)


Hx Diabetes Mellitus Type 2: Yes


Other/Comment: "borderline diabetes"





- HEMATOLOGICAL/ONCOLOGICAL


Hx Blood Disorders: Yes


Hx Anemia: Yes


Hx Blood Transfusions: Yes


Hx Blood Transfusion Reaction: No





- MUSCULOSKELETAL/RHEUMATOLOGICAL


Hx Musculoskeletal Disorders: Yes


Hx Back Pain: Yes


Hx Falls: No


Hx Herniated Disk: Yes ("LOWER BACK")





- GASTROINTESTINAL


Hx Gastrointestinal Disorders: Yes


Hx Gastritis: Yes


Hx Gastroesophageal Reflux: Yes


Hx Ulcer: Yes (2008 with H pylori treated)





- GENITOURINARY/GYNECOLOGICAL


Hx Genitourinary Disorders: Yes


Hx Bladder Stone: Yes (Obstructing left ureteral stone.)


Hx Hematuria: Yes


Hx Prostate Problems: Yes


Other/Comment: enlarged prostate





- SURGICAL HISTORY


Hx Surgeries: Yes


Hx Angiogram: Yes


Hx Angioplasty: Yes


Hx Cardiac Catheterization: Yes


Hx Coronary Stent: Yes (X4)


Hx Herniorrhaphy: Yes


Other/Comment: Bladder/Prostate surgery and perforation repair





- ANESTHESIA


Hx Anesthesia: Yes


Hx Anesthesia Reactions: No (? HAD MI 2013 AFTER PROSTATE SURGERY-PT ALSO HAD 

BLEEDING)





Meds


Allergies/Adverse Reactions: 


 Allergies











Allergy/AdvReac Type Severity Reaction Status Date / Time


 


Penicillins Allergy Unknown HAPPENED Verified 11/09/17 10:10





   AS A  





   CHILD-UNKNOWN  





   REACTION  














Physical Exam





- Constitutional


Appears: No Acute Distress





- Head Exam


Head Exam: ATRAUMATIC, NORMAL INSPECTION, NORMOCEPHALIC





- Eye Exam


Eye Exam: Normal appearance


Pupil Exam: PERRL





- ENT Exam


ENT Exam: Mucous Membranes Moist, Normal Exam





- Neck Exam


Neck exam: Positive for: Full Rom





- Respiratory Exam


Respiratory Exam: Clear to Auscultation Bilateral, NORMAL BREATHING PATTERN





- Cardiovascular Exam


Cardiovascular Exam: REGULAR RHYTHM, +S1, +S2





- GI/Abdominal Exam


GI & Abdominal Exam: Normal Bowel Sounds, Soft





- Rectal Exam


Rectal Exam: Deferred





- Extremities Exam


Extremities exam: Positive for: full ROM





- Back Exam


Back exam: FULL ROM, NORMAL INSPECTION





- Neurological Exam


Neurological exam: Alert, Oriented x3, Reflexes Normal





- Psychiatric Exam


Psychiatric exam: Normal Affect, Normal Mood





- Skin


Skin Exam: Dry, Intact





Results





- Vital Signs


Recent Vital Signs: 





 Last Vital Signs











Temp  98.8 F   08/07/18 08:00


 


Pulse  63   08/07/18 08:00


 


Resp  20   08/07/18 08:00


 


BP  155/80 H  08/07/18 08:00


 


Pulse Ox  97   08/07/18 08:00














- Labs


Result Diagrams: 


 08/07/18 06:30





 08/07/18 06:30


Labs: 





 Laboratory Results - last 24 hr











  08/07/18 08/07/18





  06:30 06:30


 


WBC  7.5 


 


RBC  3.97 L 


 


Hgb  10.0 L 


 


Hct  30.9 L 


 


MCV  78.0 L 


 


MCH  25.3 L 


 


MCHC  32.4 L 


 


RDW  19.8 H 


 


Plt Count  162 


 


MPV  9.5 


 


Sodium   138


 


Potassium   4.1


 


Chloride   106


 


Carbon Dioxide   24


 


Anion Gap   12


 


BUN   15


 


Creatinine   1.2


 


Est GFR ( Amer)   > 60


 


Est GFR (Non-Af Amer)   > 60


 


Random Glucose   144 H


 


Calcium   8.0 L














Assessment & Plan





- Assessment and Plan (Free Text)


Assessment: 





                                     Assessm,ent











            CAD.





           S/P stent 4x





           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





          Anemia.





           Pre Diabetres.





           CHF.











          


Plan: 





                                 Plan:





            Cardiac monitoring.





            Start on lovenox today.

## 2018-08-08 VITALS — TEMPERATURE: 97.3 F | HEART RATE: 74 BPM | SYSTOLIC BLOOD PRESSURE: 142 MMHG | DIASTOLIC BLOOD PRESSURE: 73 MMHG

## 2018-08-08 VITALS — OXYGEN SATURATION: 98 %

## 2018-08-08 RX ADMIN — ENOXAPARIN SODIUM SCH: 40 INJECTION SUBCUTANEOUS at 10:57

## 2018-08-08 RX ADMIN — CEFOXITIN SODIUM SCH MLS/HR: 1 INJECTION, SOLUTION INTRAVENOUS at 00:26

## 2018-08-08 NOTE — PCM.URO
Urology Progress Note





- General


General: No Complaints, Tolerating Diet





- Subjective


Abdominal Pain: No


Flank Pain: No


Nausea: No


Vomiting: No


Hematuria: Yes (sl pink)


Dsypnea: No


Chest Pain: No


Fever & Chills: No





- Objective


Lab Studies: Reviewed


Lab Results Last 24 Hours: 


 Laboratory Results - last 24 hr











  08/07/18 08/07/18





  06:30 06:30


 


WBC  7.5 


 


RBC  3.97 L 


 


Hgb  10.0 L 


 


Hct  30.9 L 


 


MCV  78.0 L 


 


MCH  25.3 L 


 


MCHC  32.4 L 


 


RDW  19.8 H 


 


Plt Count  162 


 


MPV  9.5 


 


Sodium   138


 


Potassium   4.1


 


Chloride   106


 


Carbon Dioxide   24


 


Anion Gap   12


 


BUN   15


 


Creatinine   1.2


 


Est GFR ( Amer)   > 60


 


Est GFR (Non-Af Amer)   > 60


 


Random Glucose   144 H


 


Calcium   8.0 L











Intake & Output: 


 Intake & Output











 08/07/18 08/07/18 08/08/18





 06:59 18:59 06:59


 


Intake Total 1260 530 880


 


Output Total 2700 700 1700


 


Balance -1440 -170 -820


 


Intake:   


 


  Intake, IV Amount 960 530 480


 


    Left Antecubital 960 530 480


 


  Oral 300  400


 


Output:   


 


  Urine 2700 700 1700


 


    Urethral (Seo) 2700 700 1700











Vital Signs: 


 Vital Signs - 24 hr











  08/07/18 08/07/18 08/07/18





  00:00 08:00 12:49


 


Temperature 98.5 F 98.8 F 


 


Pulse Rate 61 63 


 


Respiratory 20 20 





Rate   


 


Blood Pressure 131/70 155/80 H 


 


O2 Sat by Pulse 96 97 99





Oximetry   














  08/07/18





  15:00


 


Temperature 98.7 F


 


Pulse Rate 61


 


Respiratory 20





Rate 


 


Blood Pressure 142/68


 


O2 Sat by Pulse 99





Oximetry 














- Physical Exam


Abdominal Exam: Soft, Non-Tender, Non-Distended


Back: No CVA Tenderness


Urine Color: Light Rebecca, Pink


Extremities: Normal: Bilateral





- Male


Phallus: Normal


Scrotum: Normal


Testes: Normal: Bilateral





- Plan


Catheter Care: Yes


Ambulation - Out of Bed: Yes


Intake & Output: Yes


See Orders: Yes


Additional Information: Imp:  progressing well, p TTURBT, TURP, Stent 

inseertion.  P/Rec:  Antibiotic rx.  Catheter in place.  Resume 

anticoagulation.  Discussed w pt, family.  Discussed w nursing staff.  To 

discuss w MDs





- Date & Time of Note


Date: 08/07/18


Time: 10:10

## 2018-08-08 NOTE — CP.PCM.DIS
Provider





- Provider


Date of Admission: 


08/06/18 10:09





Attending physician: 


Kiley Burnham MD





Time Spent in preparation of Discharge (in minutes): 77





Diagnosis





- Discharge Diagnosis


(1) Calculus of left kidney


Status: Acute   





(2) CAD (coronary artery disease)


Status: Chronic   





(3) Urinary bladder neoplasm


Status: Acute   





(4) CHF (congestive heart failure)


Status: Chronic   





Hospital Course





- Lab Results


Lab Results: 


 Micro Results





08/06/18 10:53   Urine,Catheterized   Urine Culture - Final


                            Proteus Mirabilis





 Most Recent Lab Values











WBC  7.5 K/uL (4.8-10.8)   08/07/18  06:30    


 


RBC  3.97 Mil/uL (4.40-5.90)  L  08/07/18  06:30    


 


Hgb  10.0 g/dL (12.0-18.0)  L  08/07/18  06:30    


 


Hct  30.9 % (35.0-51.0)  L  08/07/18  06:30    


 


MCV  78.0 fL (80.0-94.0)  L  08/07/18  06:30    


 


MCH  25.3 pg (27.0-31.0)  L  08/07/18  06:30    


 


MCHC  32.4 g/dL (33.0-37.0)  L  08/07/18  06:30    


 


RDW  19.8 % (11.5-14.5)  H  08/07/18  06:30    


 


Plt Count  162 K/uL (130-400)   08/07/18  06:30    


 


MPV  9.5 fL (7.2-11.7)   08/07/18  06:30    


 


Sodium  138 mmol/L (132-148)   08/07/18  06:30    


 


Potassium  4.1 mmol/L (3.6-5.2)   08/07/18  06:30    


 


Chloride  106 mmol/L ()   08/07/18  06:30    


 


Carbon Dioxide  24 mmol/L (22-30)   08/07/18  06:30    


 


Anion Gap  12  (10-20)   08/07/18  06:30    


 


BUN  15 mg/dL (9-20)   08/07/18  06:30    


 


Creatinine  1.2 mg/dL (0.8-1.5)   08/07/18  06:30    


 


Est GFR ( Amer)  > 60   08/07/18  06:30    


 


Est GFR (Non-Af Amer)  > 60   08/07/18  06:30    


 


Random Glucose  144 mg/dL ()  H  08/07/18  06:30    


 


Calcium  8.0 mg/dl (8.6-10.4)  L  08/07/18  06:30    














- Hospital Course


Hospital Course: 





                                Admitted this patient for Removal of a 

calculius at the left kidney which was not done due to the ostruction by a very 

large





        prostate. Dr. Oakes has to reduce the size of the prostate by partial 

prostatectomy.He also took out 2 growths in the bladder wall. Patient was on 





      Brilinta as an outpatient but he was taken off and started on Lvenox 2x 

daily by subQ Post procedure there was not much bleeding and was discharged





     with no bleeding and restarted on Brilinta as an outpatient.





Discharge Exam





- Head Exam


Head Exam: ATRAUMATIC, NORMAL INSPECTION, NORMOCEPHALIC





- Eye Exam


Pupil Exam: PERRL





- ENT Exam


ENT Exam: Mucous Membranes Moist, Normal Exam





- Respiratory Exam


Respiratory Exam: Clear to PA & Lateral, NORMAL BREATHING PATTERN, UNREMARKABLE





- Cardiovascular Exam


Cardiovascular Exam: REGULAR RHYTHM, +S1, +S2





- GI/Abdominal Exam


GI & Abdominal Exam: Normal Bowel Sounds, Soft





- Rectal Exam


Rectal Exam: Deferred





- Extremities Exam


Extremities exam: full ROM





- Back Exam


Back exam: NORMAL INSPECTION





- Neurological Exam


Neurological exam: Alert, Oriented x3, Reflexes Normal





- Skin


Skin Exam: Dry, Intact, Normal Color, Warm





Discharge Plan





- Follow Up Plan


Condition: GOOD


Disposition: HOME/ ROUTINE


Patient education suggested?: No


Additional Instructions: 


                                            To see Dr. Oakes tomorrow in his 

office. and to see me in 2 weeks.





Clinical Quality Measures





- CQM - Stroke


Antithrombotic Prescribed: Yes





- CQM - VTE


Did patient receive overlap therapy during hosptialization?: No

## 2018-08-13 NOTE — RAD
Date of service: 08/06/2018



PROCEDURE:  Abdomen



HISTORY:

HEMATURIA AND UROLITHIASIS



COMPARISON:

10/21/2016



TECHNIQUE:

Single AP radiograph of the abdomen labeled "".



FINDINGS:

Normal bowel gas pattern. No masses are identified. There is a large 

calcification in the region of the lower pole left kidney with a 

staghorn type appearance.  Consistent with staghorn calculus. No 

other abnormal intra-abdominal calcifications are seen elsewhere. 

Please note that the staghorn callus occasion was not evident on CT 

examination of 9/19/2016. 



IMPRESSION:

Left staghorn calculus.

## 2018-08-23 ENCOUNTER — HOSPITAL ENCOUNTER (INPATIENT)
Dept: HOSPITAL 31 - C.ER | Age: 68
LOS: 13 days | Discharge: HOME | DRG: 660 | End: 2018-09-05
Attending: LEGAL MEDICINE | Admitting: LEGAL MEDICINE
Payer: MEDICARE

## 2018-08-23 VITALS — BODY MASS INDEX: 27.4 KG/M2

## 2018-08-23 DIAGNOSIS — Z88.0: ICD-10-CM

## 2018-08-23 DIAGNOSIS — Z87.442: ICD-10-CM

## 2018-08-23 DIAGNOSIS — N40.0: ICD-10-CM

## 2018-08-23 DIAGNOSIS — D68.9: ICD-10-CM

## 2018-08-23 DIAGNOSIS — Z16.24: ICD-10-CM

## 2018-08-23 DIAGNOSIS — I25.10: ICD-10-CM

## 2018-08-23 DIAGNOSIS — B96.4: ICD-10-CM

## 2018-08-23 DIAGNOSIS — E78.00: ICD-10-CM

## 2018-08-23 DIAGNOSIS — N39.0: Primary | ICD-10-CM

## 2018-08-23 DIAGNOSIS — I50.9: ICD-10-CM

## 2018-08-23 DIAGNOSIS — D49.4: ICD-10-CM

## 2018-08-23 DIAGNOSIS — D62: ICD-10-CM

## 2018-08-23 DIAGNOSIS — E11.22: ICD-10-CM

## 2018-08-23 DIAGNOSIS — I25.2: ICD-10-CM

## 2018-08-23 DIAGNOSIS — R31.9: ICD-10-CM

## 2018-08-23 DIAGNOSIS — N20.0: ICD-10-CM

## 2018-08-23 DIAGNOSIS — Z87.01: ICD-10-CM

## 2018-08-23 DIAGNOSIS — Z79.84: ICD-10-CM

## 2018-08-23 DIAGNOSIS — H40.9: ICD-10-CM

## 2018-08-23 DIAGNOSIS — I25.5: ICD-10-CM

## 2018-08-23 DIAGNOSIS — H91.90: ICD-10-CM

## 2018-08-23 DIAGNOSIS — I48.91: ICD-10-CM

## 2018-08-23 DIAGNOSIS — Z95.5: ICD-10-CM

## 2018-08-23 DIAGNOSIS — I13.0: ICD-10-CM

## 2018-08-23 DIAGNOSIS — R07.9: ICD-10-CM

## 2018-08-23 DIAGNOSIS — N18.2: ICD-10-CM

## 2018-08-23 DIAGNOSIS — E78.5: ICD-10-CM

## 2018-08-23 LAB
ALBUMIN SERPL-MCNC: 3.8 G/DL (ref 3.5–5)
ALBUMIN/GLOB SERPL: 1.2 {RATIO} (ref 1–2.1)
ALT SERPL-CCNC: 19 U/L (ref 21–72)
AST SERPL-CCNC: 17 U/L (ref 17–59)
BACTERIA #/AREA URNS HPF: (no result) /[HPF]
BASOPHILS # BLD AUTO: 0.1 K/UL (ref 0–0.2)
BASOPHILS NFR BLD: 1 % (ref 0–2)
BILIRUB UR-MCNC: NEGATIVE MG/DL
BUN SERPL-MCNC: 23 MG/DL (ref 9–20)
CALCIUM SERPL-MCNC: 9.2 MG/DL (ref 8.6–10.4)
EOSINOPHIL # BLD AUTO: 0.1 K/UL (ref 0–0.7)
EOSINOPHIL NFR BLD: 2.3 % (ref 0–4)
ERYTHROCYTE [DISTWIDTH] IN BLOOD BY AUTOMATED COUNT: 21.8 % (ref 11.5–14.5)
GFR NON-AFRICAN AMERICAN: 55
GLUCOSE UR STRIP-MCNC: NORMAL MG/DL
HGB BLD-MCNC: 9.7 G/DL (ref 12–18)
LEUKOCYTE ESTERASE UR-ACNC: (no result) LEU/UL
LIPASE: 190 U/L (ref 23–300)
LYMPHOCYTES # BLD AUTO: 1 K/UL (ref 1–4.3)
LYMPHOCYTES NFR BLD AUTO: 19.5 % (ref 20–40)
MCH RBC QN AUTO: 24.9 PG (ref 27–31)
MCHC RBC AUTO-ENTMCNC: 31.6 G/DL (ref 33–37)
MCV RBC AUTO: 78.7 FL (ref 80–94)
MONOCYTES # BLD: 0.4 K/UL (ref 0–0.8)
MONOCYTES NFR BLD: 8.2 % (ref 0–10)
NEUTROPHILS # BLD: 3.5 K/UL (ref 1.8–7)
NEUTROPHILS NFR BLD AUTO: 69 % (ref 50–75)
NRBC BLD AUTO-RTO: 0 % (ref 0–2)
PH UR STRIP: 7 [PH] (ref 5–8)
PLATELET # BLD: 204 K/UL (ref 130–400)
PMV BLD AUTO: 9 FL (ref 7.2–11.7)
PROT UR STRIP-MCNC: (no result) MG/DL
RBC # BLD AUTO: 3.89 MIL/UL (ref 4.4–5.9)
RBC # UR STRIP: (no result) /UL
SP GR UR STRIP: 1.01 (ref 1–1.03)
UROBILINOGEN UR-MCNC: NORMAL MG/DL (ref 0.2–1)
WBC # BLD AUTO: 5.1 K/UL (ref 4.8–10.8)
WBC CLUMPS # UR AUTO: (no result) /HPF

## 2018-08-23 RX ADMIN — ENOXAPARIN SODIUM SCH MG: 80 INJECTION SUBCUTANEOUS at 21:39

## 2018-08-23 NOTE — C.PDOC
History Of Present Illness


67 year old male patient presents to the ER with c/o intermittent hematuria for 

x5 days. Associated symptoms includes urinary frequency. Patient denies chest 

pain, abdominal pain, change in medication and bleeding from penis. Patient 

notes he takes Brillinta and Aspirin. He also notes he has an appointment on 

Monday for kidney stone stent removal. 


Time Seen by Provider: 08/23/18 16:05


Chief Complaint (Nursing): Male Genitourinary


History Per: Patient


History/Exam Limitations: no limitations


Onset/Duration Of Symptoms: Days (x5)


Current Symptoms Are (Timing): Still Present





Past Medical History


Reviewed: Historical Data, Nursing Documentation, Vital Signs


Vital Signs: 


 Last Vital Signs











Temp  98.3 F   08/23/18 14:59


 


Pulse  80   08/23/18 14:59


 


Resp  20   08/23/18 14:59


 


BP  144/80   08/23/18 14:59


 


Pulse Ox  100   08/23/18 18:00














- Medical History


PMH: Anemia, Benign Prostatic Hyperplasia, Cardia Arrhythmia (Ventricular 

fibrillations x 2 in the past with acute MI's.), CHF, Gastritis, HTN, 

Hypercholesterolemia, Hyperlipidemia, Kidney Stones, Peripheral Edema (not at 

present), Pneumonia, Chronic Kidney Disease


Surgical History: Coronary Stent (X4), Endoscopy





- [facility] Procedures








CARDIOPULM RESUSCITA NOS (10/14/13)


CONTINUOUS INVASIVE MECHANICAL VENTILATION <96 CONSEC HRS (10/14/13)


CONTROL POSTOP PROST HEM (10/14/13)


CORONAR ARTERIOGR-2 CATH (10/14/13)


DESTRUCTION OF PROSTATE, ENDO (02/21/16)


DILATION OF BLADDER WITH INTRALUMINAL DEVICE, ENDO (02/21/16)


DILATION OF L KIDNEY PELVIS WITH INTRALUM DEV, PERC APPROACH (02/21/16)


DILATION OF LEFT URETER WITH INTRALUMINAL DEVICE, ENDO (08/06/18)


DIR ING HERNIA REP-GRAFT (07/19/01)


DRAINAGE OF BLADDER WITH DRAINAGE DEVICE, OPEN APPROACH (02/21/16)


ESOPHAGOGASTRODUODENOSCOPY [EGD] W/CLOSED BIOPSY (01/23/02)


EXC LES SOFT TISSUE NEC (07/19/01)


EXCISION OF BLADDER, ENDO (08/06/18)


EXCISION OF PROSTATE, ENDO (08/06/18)


EXCISION OF STOMACH, ENDO, DIAGN (11/09/17)


EXTIRPATION OF MATTER FROM BLADDER, ENDO (02/21/16)


EXTIRPATION OF MATTER FROM LEFT URETER, ENDO (02/21/16)


FLUOROSCOPY OF LEFT HEART USING LOW OSMOLAR CONTRAST (04/10/16)


INSERT ENDOTRACHEAL TUBE (10/14/13)


INSERTION OF ENDOTRACHEAL AIRWAY INTO TRACHEA, VIA OPENING (02/21/16)


INSERTION OF ONE VASCULAR STENT (10/14/13)


INSPECTION OF LARYNX, ENDO (02/21/16)


INSPECTION OF LOWER INTESTINAL TRACT, ENDO (11/09/17)


INSPECTION OF PERITONEAL CAVITY, OPEN APPROACH (02/21/16)


INSPECTION OF TRACHEOBRONCHIAL TREE, ENDO (02/21/16)


INTRODUCTION OF NUTRITIONAL INTO UP GI, VIA OPENING (02/21/16)


INTRODUCTION OF VASOPRESSOR INTO PERIPH VEIN, PERC APPROACH (02/21/16)


LEFT HEART CARDIAC CATH (10/14/13)


LT HEART ANGIOCARDIOGRAM (10/14/13)


MEASURE OF CARDIAC SAMPL & PRESSURE, L HEART, PERC APPROACH (04/10/16)


PACKED CELL TRANSFUSION (10/14/13)


PERCUTANEOUS TRANSLUMINAL CORONARY ANGIOPLASTY [PTCA] (10/14/13)


PROCEDURE ON SINGLE VESSEL (10/14/13)


REPAIR BLADDER, OPEN APPROACH (02/21/16)


RESPIRATORY VENTILATION, GREATER THAN 96 CONSECUTIVE HOURS (02/21/16)


RETROGR CYSTOURETHROGRAM (10/14/13)


TRANSFUSE NONAUT FROZEN PLASMA IN PERIPH VEIN, PERC (02/21/16)


TRANSFUSE NONAUT RED BLOOD CELLS IN PERIPH VEIN, PERC (11/09/17)


TRANSURETH BLADD BIOPSY (10/14/13)


TU DESTRUC BLADD LES NEC (10/14/13)








Family History: States: Unknown Family Hx





- Social History


Hx Tobacco Use: No


Hx Alcohol Use: No


Hx Substance Use: No





- Immunization History


Hx Tetanus Toxoid Vaccination: No


Hx Influenza Vaccination: Yes


Hx Pneumococcal Vaccination: Yes





Review Of Systems


Constitutional: Negative for: Other (change in medication)


Cardiovascular: Negative for: Chest Pain


Gastrointestinal: Negative for: Abdominal Pain


Genitourinary: Positive for: Frequency, Hematuria.  Negative for: Other (bleed 

from penis )





Physical Exam





- Physical Exam


Appears: Non-toxic, No Acute Distress


Skin: Warm, Dry


Head: Atraumatic, Normacephalic


Eye(s): bilateral: PERRL, EOMI


Oral Mucosa: Moist


Neck: Supple


Chest: No Tenderness


Cardiovascular: Rhythm Regular, No Murmur


Respiratory: Normal Breath Sounds, No Rales, No Rhonchi, No Wheezing


Gastrointestinal/Abdominal: Soft, No Tenderness, No Distention


Back: No CVA Tenderness


Extremity: No Calf Tenderness, No Swelling


Neurological/Psych: Oriented x3, No Other (gross focal deficit)





ED Course And Treatment


O2 Sat by Pulse Oximetry: 100 (RA)


Pulse Ox Interpretation: Normal





Medical Decision Making


Medical Decision Making: 


Impression: intermittent hematuria with urinary frequency 


Plans: 


-- Urine Cx 


-- UA 


Urologist will be called to discuss case.





17:35 PM Dr. Spivey: wants admission. 


17:44 PM Dr. Burnham PMD, agrees to admit patient. 





Disposition


Discussed With : Kiley Burnham


Doctor Will See Patient In The: Hospital





- Disposition


Disposition: HOSPITALIZED


Disposition Time: 18:00


Condition: GOOD


Forms:  [facility] (English)





- Clinical Impression


Clinical Impression: 


 UTI (urinary tract infection), Hematuria








- PA / NP / Resident Statement


MD/ has reviewed & agrees with the documentation as recorded.





- Scribe Statement


The provider has reviewed the documentation as recorded by the Shelly Barnard Do





All medical record entries made by the Scribe were at my direction and 

personally dictated by me. I have reviewed the chart and agree that the record 

accurately reflects my personal performance of the history, physical exam, 

medical decision making, and the department course for this patient. I have 

also personally directed, reviewed, and agree with the discharge instructions 

and disposition.

## 2018-08-24 LAB
BASOPHILS # BLD AUTO: 0 K/UL (ref 0–0.2)
BASOPHILS NFR BLD: 0.8 % (ref 0–2)
BUN SERPL-MCNC: 20 MG/DL (ref 9–20)
CALCIUM SERPL-MCNC: 8.4 MG/DL (ref 8.6–10.4)
EOSINOPHIL # BLD AUTO: 0.2 K/UL (ref 0–0.7)
EOSINOPHIL NFR BLD: 3.1 % (ref 0–4)
ERYTHROCYTE [DISTWIDTH] IN BLOOD BY AUTOMATED COUNT: 21.3 % (ref 11.5–14.5)
GFR NON-AFRICAN AMERICAN: > 60
HGB BLD-MCNC: 9.3 G/DL (ref 12–18)
LYMPHOCYTES # BLD AUTO: 1.1 K/UL (ref 1–4.3)
LYMPHOCYTES NFR BLD AUTO: 21.8 % (ref 20–40)
MCH RBC QN AUTO: 25.3 PG (ref 27–31)
MCHC RBC AUTO-ENTMCNC: 32.2 G/DL (ref 33–37)
MCV RBC AUTO: 78.6 FL (ref 80–94)
MONOCYTES # BLD: 0.5 K/UL (ref 0–0.8)
MONOCYTES NFR BLD: 9.6 % (ref 0–10)
NEUTROPHILS # BLD: 3.4 K/UL (ref 1.8–7)
NEUTROPHILS NFR BLD AUTO: 64.7 % (ref 50–75)
NRBC BLD AUTO-RTO: 0 % (ref 0–2)
PLATELET # BLD: 186 K/UL (ref 130–400)
PMV BLD AUTO: 8.7 FL (ref 7.2–11.7)
RBC # BLD AUTO: 3.67 MIL/UL (ref 4.4–5.9)
WBC # BLD AUTO: 5.2 K/UL (ref 4.8–10.8)

## 2018-08-24 RX ADMIN — CIPROFLOXACIN SCH MLS/HR: 2 INJECTION, SOLUTION INTRAVENOUS at 18:12

## 2018-08-24 RX ADMIN — ENOXAPARIN SODIUM SCH MG: 80 INJECTION SUBCUTANEOUS at 21:43

## 2018-08-24 RX ADMIN — ENOXAPARIN SODIUM SCH MG: 80 INJECTION SUBCUTANEOUS at 10:41

## 2018-08-24 RX ADMIN — CIPROFLOXACIN SCH MLS/HR: 2 INJECTION, SOLUTION INTRAVENOUS at 06:00

## 2018-08-24 NOTE — RAD
Date of service: 



08/24/2018



PROCEDURE:  CHEST RADIOGRAPH, 1 VIEW



HISTORY:

CHF 



COMPARISON:

Comparison chest dated 07/19/2018



FINDINGS:



LUNGS:

Clear.



PLEURA:

No pneumothorax or pleural fluid seen.



CARDIOVASCULAR:

Normal.



OSSEOUS STRUCTURES:

No significant abnormalities.



VISUALIZED UPPER ABDOMEN:

Normal.



OTHER FINDINGS:

None. 



IMPRESSION:

No active disease.

## 2018-08-24 NOTE — CP.PCM.CON
Past Patient History





- Infectious Disease


Hx of Infectious Diseases: None





- Tetanus Immunizations


Tetanus Immunization: Unknown





- Past Medical History & Family History


Past Medical History?: Yes





- Past Social History


Smoking Status: Never Smoked





- CARDIAC


Hx Cardia Arrhythmia: Yes (Ventricular fibrillations x 2 in the past with acute 

MI's.)


Hx Congestive Heart Failure: Yes


Hx Hypercholesterolemia: Yes


Hx Hypertension: Yes


Hx Peripheral Edema: Yes (not at present)





- PULMONARY


Hx Pneumonia: Yes





- NEUROLOGICAL


Hx Neurological Disorder: No





- HEENT


Hx HEENT Problems: Yes


Hx Deafness: Yes (HEARING AIDS)


Hx Glaucoma: Yes





- RENAL


Hx Chronic Kidney Disease: Yes


Hx Kidney Stones: Yes





- ENDOCRINE/METABOLIC


Hx Endocrine Disorders: Yes (Pre diabetes/no medication)


Hx Diabetes Mellitus Type 2: Yes


Other/Comment: "borderline diabetes"





- HEMATOLOGICAL/ONCOLOGICAL


Hx Anemia: Yes





- MUSCULOSKELETAL/RHEUMATOLOGICAL


Hx Musculoskeletal Disorders: Yes


Hx Back Pain: Yes


Hx Falls: No


Hx Herniated Disk: Yes ("LOWER BACK")





- GASTROINTESTINAL


Hx Gastritis: Yes





- GENITOURINARY/GYNECOLOGICAL


Hx Genitourinary Disorders: Yes


Hx Bladder Stone: Yes (Obstructing left ureteral stone.)


Hx Hematuria: Yes


Hx Prostate Problems: Yes


Other/Comment: enlarged prostate





- PSYCHIATRIC


Hx Substance Use: No





- SURGICAL HISTORY


Hx Coronary Stent: Yes (X4)





- ANESTHESIA


Hx Anesthesia: Yes


Hx Anesthesia Reactions: No (? HAD MI 2013 AFTER PROSTATE SURGERY-PT ALSO HAD 

BLEEDING)





Meds


Allergies/Adverse Reactions: 


 Allergies











Allergy/AdvReac Type Severity Reaction Status Date / Time


 


Penicillins Allergy Unknown HAPPENED Verified 08/23/18 14:58





   AS A  





   CHILD-UNKNOWN  





   REACTION  














- Medications


Medications: 


 Current Medications





Carvedilol (Coreg)  6.25 mg PO BID [facility]


   Last Admin: 08/23/18 21:04 Dose:  6.25 mg


Enoxaparin Sodium (Lovenox)  80 mg SC Q12 [facility]


   Last Admin: 08/23/18 21:39 Dose:  80 mg


Finasteride (Proscar)  5 mg PO DAILY [facility]


Furosemide (Lasix)  40 mg PO DAILY [facility]


Ciprofloxacin (Cipro 400mg/200ml Dsw)  400 mg in 200 mls @ 133 mls/hr IVPB Q12H 

GRETCHEN


   PRN Reason: Protocol


   Last Admin: 08/24/18 06:00 Dose:  133 mls/hr


Losartan Potassium (Cozaar)  100 mg PO DAILY [facility]


Rosuvastatin Calcium (Crestor)  10 mg PO HS [facility]


   Last Admin: 08/23/18 22:14 Dose:  10 mg


Tamsulosin HCl (Flomax)  0.4 mg PO DAILY [facility]











Results





- Vital Signs


Recent Vital Signs: 


 Last Vital Signs











Temp  98.0 F   08/24/18 07:22


 


Pulse  75   08/24/18 07:22


 


Resp  20   08/24/18 07:22


 


BP  131/80   08/24/18 07:22


 


Pulse Ox  99   08/24/18 07:22














- Labs


Result Diagrams: 


 08/24/18 07:15





 08/24/18 07:15


Labs: 


 Laboratory Results - last 24 hr











  08/23/18 08/23/18 08/23/18





  17:07 18:07 18:07


 


WBC   5.1 


 


RBC   3.89 L 


 


Hgb   9.7 L 


 


Hct   30.6 L 


 


MCV   78.7 L 


 


MCH   24.9 L 


 


MCHC   31.6 L 


 


RDW   21.8 H 


 


Plt Count   204 


 


MPV   9.0 


 


Neut % (Auto)   69.0 


 


Lymph % (Auto)   19.5 L 


 


Mono % (Auto)   8.2 


 


Eos % (Auto)   2.3 


 


Baso % (Auto)   1.0 


 


Neut # (Auto)   3.5 


 


Lymph # (Auto)   1.0 


 


Mono # (Auto)   0.4 


 


Eos # (Auto)   0.1 


 


Baso # (Auto)   0.1 


 


Sodium    142


 


Potassium    3.7


 


Chloride    108 H


 


Carbon Dioxide    25


 


Anion Gap    13


 


BUN    23 H


 


Creatinine    1.3


 


Est GFR ( Amer)    > 60


 


Est GFR (Non-Af Amer)    55


 


Random Glucose    127 H


 


Calcium    9.2


 


Total Bilirubin    0.4


 


AST    17


 


ALT    19 L


 


Alkaline Phosphatase    61


 


Total Protein    7.0


 


Albumin    3.8


 


Globulin    3.2


 


Albumin/Globulin Ratio    1.2


 


Lipase    190


 


Urine Color  Yellow  


 


Urine Clarity  Hazy  


 


Urine pH  7.0  


 


Ur Specific Gravity  1.014  


 


Urine Protein  2+ H  


 


Urine Glucose (UA)  Normal  


 


Urine Ketones  Negative  


 


Urine Blood  3+ H  


 


Urine Nitrate  Negative  


 


Urine Bilirubin  Negative  


 


Urine Urobilinogen  Normal  


 


Ur Leukocyte Esterase  3+ H  


 


Urine WBC (Auto)  679 H  


 


Urine RBC (Auto)  741 H  


 


Urine WBC Clumps (Auto)  Few H  


 


Urine Bacteria  Mod H  














  08/24/18 08/24/18





  07:15 07:15


 


WBC  5.2 


 


RBC  3.67 L 


 


Hgb  9.3 L 


 


Hct  28.9 L 


 


MCV  78.6 L 


 


MCH  25.3 L 


 


MCHC  32.2 L 


 


RDW  21.3 H 


 


Plt Count  186 


 


MPV  8.7 


 


Neut % (Auto)  64.7 


 


Lymph % (Auto)  21.8 


 


Mono % (Auto)  9.6 


 


Eos % (Auto)  3.1 


 


Baso % (Auto)  0.8 


 


Neut # (Auto)  3.4 


 


Lymph # (Auto)  1.1 


 


Mono # (Auto)  0.5 


 


Eos # (Auto)  0.2 


 


Baso # (Auto)  0.0 


 


Sodium   141


 


Potassium   3.9


 


Chloride   109 H


 


Carbon Dioxide   22


 


Anion Gap   14


 


BUN   20


 


Creatinine   1.1


 


Est GFR ( Amer)   > 60


 


Est GFR (Non-Af Amer)   > 60


 


Random Glucose   112 H


 


Calcium   8.4 L


 


Total Bilirubin  


 


AST  


 


ALT  


 


Alkaline Phosphatase  


 


Total Protein  


 


Albumin  


 


Globulin  


 


Albumin/Globulin Ratio  


 


Lipase  


 


Urine Color  


 


Urine Clarity  


 


Urine pH  


 


Ur Specific Gravity  


 


Urine Protein  


 


Urine Glucose (UA)  


 


Urine Ketones  


 


Urine Blood  


 


Urine Nitrate  


 


Urine Bilirubin  


 


Urine Urobilinogen  


 


Ur Leukocyte Esterase  


 


Urine WBC (Auto)  


 


Urine RBC (Auto)  


 


Urine WBC Clumps (Auto)  


 


Urine Bacteria  














Assessment & Plan





- Assessment and Plan (Free Text)


Assessment: 





IMP:


Hematuria


BPH


Urolithiasis


CAD





Full note tbd





YS








- Date & Time


Date: 08/24/18


Time: 09:40

## 2018-08-24 NOTE — CARD
--------------- APPROVED REPORT --------------





Date of service: 08/23/2018



EKG Measurement

Heart [id number]

[id number]

[id number]

[id number]

[id number]



<Conclusion>

Normal sinus rhythm

Voltage criteria for left ventricular hypertrophy

Cannot rule out Inferior infarct, age undetermined

Abnormal ECG

## 2018-08-24 NOTE — CP.PCM.CON
History of Present Illness





- History of Present Illness


History of Present Illness: 





67 years old male hospitalized for hematuria. He underwent a partial TURP, 

resection of bladder mass and insertion of a ureteral stent  2 weeks ago. 

Patient is known to have an ischemic cardiomyopathy, a mild CKD, a BPH, kidney 

stones and urinary bladder tumors. 





Review of Systems





- Genitourinary


Genitourinary: Hematuria





Past Patient History





- Infectious Disease


Hx of Infectious Diseases: None





- Tetanus Immunizations


Tetanus Immunization: Unknown





- Past Medical History & Family History


Past Medical History?: Yes





- Past Social History


Smoking Status: Never Smoked


Chewing Tobacco Use: No


Cigar Use: No


Alcohol: None


Drugs: Denies


Home Situation {Lives}: With Family


Domestic Violence: Negative





- CARDIAC


Hx Cardiac Disorders: Yes


Hx Angina: Yes


Hx Atrial Fibrillation: Yes


Hx Cardia Arrhythmia: Yes (Ventricular fibrillations x 2 in the past with acute 

MI's.)


Hx Congestive Heart Failure: Yes


Hx Heart Attack: Yes


Hx Hypercholesterolemia: Yes


Hx Hypertension: Yes


Hx Peripheral Edema: Yes (not at present)





- PULMONARY


Hx Pneumonia: Yes





- NEUROLOGICAL


Hx Neurological Disorder: No





- HEENT


Hx HEENT Problems: Yes


Hx Deafness: Yes (HEARING AIDS)


Hx Glaucoma: Yes





- RENAL


Hx Chronic Kidney Disease: Yes


Hx Kidney Stones: Yes





- ENDOCRINE/METABOLIC


Hx Endocrine Disorders: Yes (Pre diabetes/no medication)


Hx Diabetes Mellitus Type 2: Yes


Other/Comment: "borderline diabetes"





- HEMATOLOGICAL/ONCOLOGICAL


Hx Anemia: Yes (sec to blood loss.)





- MUSCULOSKELETAL/RHEUMATOLOGICAL


Hx Musculoskeletal Disorders: Yes


Hx Back Pain: Yes


Hx Falls: No


Hx Herniated Disk: Yes ("LOWER BACK")





- GASTROINTESTINAL


Hx Gastritis: Yes





- GENITOURINARY/GYNECOLOGICAL


Hx Genitourinary Disorders: Yes


Hx Bladder Stone: Yes (Obstructing left ureteral stone.)


Hx Hematuria: Yes


Hx Prostate Problems: Yes


Other/Comment: enlarged prostate





- PSYCHIATRIC


Hx Substance Use: No





- SURGICAL HISTORY


Hx Cardiac Catheterization: Yes


Hx Coronary Stent: Yes (X4)





- ANESTHESIA


Hx Anesthesia: Yes


Hx Anesthesia Reactions: No (? HAD MI 2013 AFTER PROSTATE SURGERY-PT ALSO HAD 

BLEEDING)


Has any member of the family had a problem w/ anesthesia?: No





Meds


Allergies/Adverse Reactions: 


 Allergies











Allergy/AdvReac Type Severity Reaction Status Date / Time


 


Penicillins Allergy Unknown HAPPENED Verified 08/23/18 14:58





   AS A  





   CHILD-UNKNOWN  





   REACTION  














- Medications


Medications: 


 Current Medications





Carvedilol (Coreg)  [facility]ID [facility]


   Last Admin: 08/24/18 18:13 Dose:  6.25 mg


Enoxaparin Sodium (Lovenox)  80 mg SC Q12 [facility]


   Last Admin: 08/24/18 10:41 Dose:  80 mg


Finasteride (Proscar)  5 mg PO DAILY [facility]


   Last Admin: 08/24/18 10:40 Dose:  5 mg


Furosemide (Lasix)  40 mg PO DAILY [facility]


   Last Admin: 08/24/18 10:40 Dose:  40 mg


Ciprofloxacin (Cipro 400mg/200ml Dsw)  400 mg in 200 mls @ 133 mls/hr IVPB Q12H 

[facility]


   PRN Reason: Protocol


   Last Admin: 08/24/18 18:12 Dose:  133 mls/hr


Losartan Potassium (Cozaar)  100 mg PO DAILY [facility]


   Last Admin: 08/24/18 10:40 Dose:  100 mg


Rosuvastatin Calcium (Crestor)  10 mg PO HS [facility]


   Last Admin: 08/23/18 22:14 Dose:  10 mg


Tamsulosin HCl (Flomax)  0.4 mg PO DAILY [facility]


   Last Admin: 08/24/18 10:40 Dose:  0.4 mg











Physical Exam





- Constitutional


Appears: Well, No Acute Distress





- Head Exam


Head Exam: NORMAL INSPECTION





- Eye Exam


Eye Exam: Normal appearance


Pupil Exam: NORMAL ACCOMODATION





- ENT Exam


ENT Exam: Normal Exam





- Neck Exam


Neck exam: Positive for: Normal Inspection





- Respiratory Exam


Respiratory Exam: Clear to Auscultation Bilateral, NORMAL BREATHING PATTERN





- Cardiovascular Exam


Cardiovascular Exam: REGULAR RHYTHM





- GI/Abdominal Exam


GI & Abdominal Exam: Normal Bowel Sounds, Soft





- Rectal Exam


Rectal Exam: Deferred





-  Exam


 Exam: NORMAL INSPECTION





- Extremities Exam


Extremities exam: Positive for: normal inspection





- Back Exam


Back exam: NORMAL INSPECTION





- Neurological Exam


Neurological exam: Alert, Oriented x3





- Psychiatric Exam


Psychiatric exam: Anxious





- Skin


Skin Exam: Dry, Intact, Normal Color, Warm





Results





- Vital Signs


Recent Vital Signs: 


 Last Vital Signs











Temp  98.4 F   08/24/18 15:53


 


Pulse  90   08/24/18 18:13


 


Resp  20   08/24/18 15:53


 


BP  124/77   08/24/18 18:13


 


Pulse Ox  99   08/24/18 15:53














- Labs


Result Diagrams: 


 08/24/18 07:15





 08/24/18 07:15


Labs: 


 Laboratory Results - last 24 hr











  08/24/18 08/24/18 08/24/18





  07:15 07:15 19:24


 


WBC  5.2  


 


RBC  3.67 L  


 


Hgb  9.3 L  


 


Hct  28.9 L  


 


MCV  78.6 L  


 


MCH  25.3 L  


 


MCHC  32.2 L  


 


RDW  21.3 H  


 


Plt Count  186  


 


MPV  8.7  


 


Neut % (Auto)  64.7  


 


Lymph % (Auto)  21.8  


 


Mono % (Auto)  9.6  


 


Eos % (Auto)  3.1  


 


Baso % (Auto)  0.8  


 


Neut # (Auto)  3.4  


 


Lymph # (Auto)  1.1  


 


Mono # (Auto)  0.5  


 


Eos # (Auto)  0.2  


 


Baso # (Auto)  0.0  


 


Sodium   141 


 


Potassium   3.9 


 


Chloride   109 H 


 


Carbon Dioxide   22 


 


Anion Gap   14 


 


BUN   20 


 


Creatinine   1.1 


 


Est GFR ( Amer)   > 60 


 


Est GFR (Non-Af Amer)   > 60 


 


POC Glucose (mg/dL)    150 H


 


Random Glucose   112 H 


 


Calcium   8.4 L 














Assessment & Plan


(1) Hematuria


Assessment and Plan: 


As per Dr Oakes.


Status: Acute   





(2) UTI (urinary tract infection)


Assessment and Plan: 


On Cipro.


Status: Acute   





(3) Anemia associated with acute blood loss


Status: Acute   Priority: High   Onset Date: ~11/07/17

## 2018-08-24 NOTE — CP.PCM.HP
History of Present Illness





- History of Present Illness


History of Present Illness: 





                            Admitted this 67 years old male because of bleeding 

from the bladder. and to have removal of a augusto at the left kidney.





            [facility] 

prostatectomy due to a very large proatate, and excision





           of 2 urinary bladder tumours.  He is complicated in that he had CAD 

and had 2 stents put in and has to be on Blirinta. Patient was 





          Given Lovenox 80 mgm by subq 2x daily before the procedure. Patient 

is now admitted before the procedure for subq Lovenox and 





         close supervision for he is still bleeding actively. 





Present on Admission





- Present on Admission


Any Indicators Present on Admission: No





Review of Systems





- EENT


Ears: Decreased Hearing





- Cardiovascular


Cardiovascular: Pedal Edema





- Genitourinary


Genitourinary: Flank Pain, Hematuria





Past Patient History





- Infectious Disease


Hx of Infectious Diseases: None





- Tetanus Immunizations


Tetanus Immunization: Unknown





- Past Medical History & Family History


Past Medical History?: Yes





- Past Social History


Smoking Status: Never Smoked


Chewing Tobacco Use: No


Cigar Use: No


Alcohol: None


Drugs: Denies


Home Situation {Lives}: With Family


Domestic Violence: Negative





- CARDIAC


Hx Cardiac Disorders: Yes


Hx Angina: Yes


Hx Atrial Fibrillation: Yes


Hx Cardia Arrhythmia: Yes (Ventricular fibrillations x 2 in the past with acute 

MI's.)


Hx Congestive Heart Failure: Yes


Hx Heart Attack: Yes


Hx Hypercholesterolemia: Yes


Hx Hypertension: Yes


Hx Peripheral Edema: Yes (not at present)





- PULMONARY


Hx Pneumonia: Yes





- NEUROLOGICAL


Hx Neurological Disorder: No





- HEENT


Hx HEENT Problems: Yes


Hx Deafness: Yes (HEARING AIDS)


Hx Glaucoma: Yes





- RENAL


Hx Chronic Kidney Disease: Yes


Hx Kidney Stones: Yes





- ENDOCRINE/METABOLIC


Hx Endocrine Disorders: Yes (Pre diabetes/no medication)


Hx Diabetes Mellitus Type 2: Yes


Other/Comment: "borderline diabetes"





- HEMATOLOGICAL/ONCOLOGICAL


Hx Anemia: Yes (sec to blood loss.)





- MUSCULOSKELETAL/RHEUMATOLOGICAL


Hx Musculoskeletal Disorders: Yes


Hx Back Pain: Yes


Hx Falls: No


Hx Herniated Disk: Yes ("LOWER BACK")





- GASTROINTESTINAL


Hx Gastritis: Yes





- GENITOURINARY/GYNECOLOGICAL


Hx Genitourinary Disorders: Yes


Hx Bladder Stone: Yes (Obstructing left ureteral stone.)


Hx Hematuria: Yes


Hx Prostate Problems: Yes


Other/Comment: enlarged prostate





- PSYCHIATRIC


Hx Substance Use: No





- SURGICAL HISTORY


Hx Cardiac Catheterization: Yes


Hx Coronary Stent: Yes (X4)





- ANESTHESIA


Hx Anesthesia: Yes


Hx Anesthesia Reactions: No (? HAD MI 2013 AFTER PROSTATE SURGERY-PT ALSO HAD 

BLEEDING)


Has any member of the family had a problem w/ anesthesia?: No





Meds


Allergies/Adverse Reactions: 


 Allergies











Allergy/AdvReac Type Severity Reaction Status Date / Time


 


Penicillins Allergy Unknown HAPPENED Verified 08/23/18 14:58





   AS A  





   CHILD-UNKNOWN  





   REACTION  














Physical Exam





- Constitutional


Appears: Non-toxic, No Acute Distress





- Head Exam


Head Exam: ATRAUMATIC, NORMAL INSPECTION, NORMOCEPHALIC





- Eye Exam


Eye Exam: EOMI, Normal appearance, PERRL


Pupil Exam: PERRL





- ENT Exam


ENT Exam: Mucous Membranes Moist, Normal Exam





- Neck Exam


Neck exam: Positive for: Full Rom, Normal Inspection





- Respiratory Exam


Respiratory Exam: Clear to Auscultation Bilateral, NORMAL BREATHING PATTERN





- Cardiovascular Exam


Cardiovascular Exam: REGULAR RHYTHM, +S1, +S2





- GI/Abdominal Exam


GI & Abdominal Exam: Normal Bowel Sounds, Soft





- Rectal Exam


Rectal Exam: Deferred





- Extremities Exam


Extremities exam: Positive for: full ROM





- Back Exam


Back exam: FULL ROM, NORMAL INSPECTION





- Neurological Exam


Neurological exam: Alert, Oriented x3, Reflexes Normal





- Psychiatric Exam


Psychiatric exam: Normal Affect, Normal Mood





- Skin


Skin Exam: Dry, Intact, Normal Color





Results





- Vital Signs


Recent Vital Signs: 





 Last Vital Signs











Temp  98.0 F   08/24/18 07:22


 


Pulse  75   08/24/18 07:22


 


Resp  20   08/24/18 07:22


 


BP  131/80   08/24/18 07:22


 


Pulse Ox  99   08/24/18 07:22














- Labs


Result Diagrams: 


 08/24/18 07:15





 08/24/18 07:15


Labs: 





 Laboratory Results - last 24 hr











  08/23/18 08/23/18 08/23/18





  17:07 18:07 18:07


 


WBC   5.1 


 


RBC   3.89 L 


 


Hgb   9.7 L 


 


Hct   30.6 L 


 


MCV   78.7 L 


 


MCH   24.9 L 


 


MCHC   31.6 L 


 


RDW   21.8 H 


 


Plt Count   204 


 


MPV   9.0 


 


Neut % (Auto)   69.0 


 


Lymph % (Auto)   19.5 L 


 


Mono % (Auto)   8.2 


 


Eos % (Auto)   2.3 


 


Baso % (Auto)   1.0 


 


Neut # (Auto)   3.5 


 


Lymph # (Auto)   1.0 


 


Mono # (Auto)   0.4 


 


Eos # (Auto)   0.1 


 


Baso # (Auto)   0.1 


 


Sodium    142


 


Potassium    3.7


 


Chloride    108 H


 


Carbon Dioxide    25


 


Anion Gap    13


 


BUN    23 H


 


Creatinine    1.3


 


Est GFR ( Amer)    > 60


 


Est GFR (Non-Af Amer)    55


 


Random Glucose    127 H


 


Calcium    9.2


 


Total Bilirubin    0.4


 


AST    17


 


ALT    19 L


 


Alkaline Phosphatase    61


 


Total Protein    7.0


 


Albumin    3.8


 


Globulin    3.2


 


Albumin/Globulin Ratio    1.2


 


Lipase    190


 


Urine Color  Yellow  


 


Urine Clarity  Hazy  


 


Urine pH  7.0  


 


Ur Specific Gravity  1.014  


 


Urine Protein  2+ H  


 


Urine Glucose (UA)  Normal  


 


Urine Ketones  Negative  


 


Urine Blood  3+ H  


 


Urine Nitrate  Negative  


 


Urine Bilirubin  Negative  


 


Urine Urobilinogen  Normal  


 


Ur Leukocyte Esterase  3+ H  


 


Urine WBC (Auto)  679 H  


 


Urine RBC (Auto)  741 H  


 


Urine WBC Clumps (Auto)  Few H  


 


Urine Bacteria  Mod H  














  08/24/18 08/24/18





  07:15 07:15


 


WBC  5.2 


 


RBC  3.67 L 


 


Hgb  9.3 L 


 


Hct  28.9 L 


 


MCV  78.6 L 


 


MCH  25.3 L 


 


MCHC  32.2 L 


 


RDW  21.3 H 


 


Plt Count  186 


 


MPV  8.7 


 


Neut % (Auto)  64.7 


 


Lymph % (Auto)  21.8 


 


Mono % (Auto)  9.6 


 


Eos % (Auto)  3.1 


 


Baso % (Auto)  0.8 


 


Neut # (Auto)  3.4 


 


Lymph # (Auto)  1.1 


 


Mono # (Auto)  0.5 


 


Eos # (Auto)  0.2 


 


Baso # (Auto)  0.0 


 


Sodium   141


 


Potassium   3.9


 


Chloride   109 H


 


Carbon Dioxide   22


 


Anion Gap   14


 


BUN   20


 


Creatinine   1.1


 


Est GFR ( Amer)   > 60


 


Est GFR (Non-Af Amer)   > 60


 


Random Glucose   112 H


 


Calcium   8.4 L


 


Total Bilirubin  


 


AST  


 


ALT  


 


Alkaline Phosphatase  


 


Total Protein  


 


Albumin  


 


Globulin  


 


Albumin/Globulin Ratio  


 


Lipase  


 


Urine Color  


 


Urine Clarity  


 


Urine pH  


 


Ur Specific Gravity  


 


Urine Protein  


 


Urine Glucose (UA)  


 


Urine Ketones  


 


Urine Blood  


 


Urine Nitrate  


 


Urine Bilirubin  


 


Urine Urobilinogen  


 


Ur Leukocyte Esterase  


 


Urine WBC (Auto)  


 


Urine RBC (Auto)  


 


Urine WBC Clumps (Auto)  


 


Urine Bacteria  














Assessment & Plan





- Assessment and Plan (Free Text)


Assessment: 





                                               








                                         Assessment:











                CAD.





               Active Urinary bleeding sec to partial prostatectomy and 

excision of 2 urinary bladder tumours.





              Urolithiasis





              Prediabetes.








              Hypertension.


Plan: 





                                           Plan:








          Will continue the lovenox subq.





        Observe the hgb, daily and transfuse if need be.





- Date & Time


Date: 08/24/18


Time: 10:40

## 2018-08-25 LAB
ERYTHROCYTE [DISTWIDTH] IN BLOOD BY AUTOMATED COUNT: 21.2 % (ref 11.5–14.5)
HGB BLD-MCNC: 9.4 G/DL (ref 12–18)
MCH RBC QN AUTO: 25.2 PG (ref 27–31)
MCHC RBC AUTO-ENTMCNC: 32.5 G/DL (ref 33–37)
MCV RBC AUTO: 77.7 FL (ref 80–94)
PLATELET # BLD: 228 K/UL (ref 130–400)
PMV BLD AUTO: 9 FL (ref 7.2–11.7)
RBC # BLD AUTO: 3.71 MIL/UL (ref 4.4–5.9)
WBC # BLD AUTO: 7.9 K/UL (ref 4.8–10.8)

## 2018-08-25 RX ADMIN — ENOXAPARIN SODIUM SCH MG: 80 INJECTION SUBCUTANEOUS at 09:13

## 2018-08-25 RX ADMIN — ENOXAPARIN SODIUM SCH MG: 80 INJECTION SUBCUTANEOUS at 21:53

## 2018-08-25 RX ADMIN — CIPROFLOXACIN SCH: 2 INJECTION, SOLUTION INTRAVENOUS at 17:22

## 2018-08-25 RX ADMIN — SODIUM CHLORIDE SCH MLS/HR: 0.9 INJECTION, SOLUTION INTRAVENOUS at 22:15

## 2018-08-25 RX ADMIN — CIPROFLOXACIN SCH MLS/HR: 2 INJECTION, SOLUTION INTRAVENOUS at 05:49

## 2018-08-25 NOTE — CP.PCM.PN
Subjective





- Date & Time of Evaluation


Date of Evaluation: 08/25/18


Time of Evaluation: 14:26





- Subjective


Subjective: 





Patient still as mild hematuria. Urine culture reveals Proteus Mirabilis 

sensitive to Amikacin and Ertapenem. Will ask Dr IRAM Lopez to evaluate the patient.





Objective





- Vital Signs/Intake and Output


Vital Signs (last 24 hours): 


 











Temp Pulse Resp BP Pulse Ox


 


 98.0 F   98 H  20   117/75   98 


 


 08/25/18 07:00  08/25/18 09:12  08/25/18 07:00  08/25/18 09:20  08/25/18 08:00








Intake and Output: 


 











 08/25/18 08/25/18





 06:59 18:59


 


Intake Total 700 


 


Output Total 3300 800


 


Balance -2600 -800














- Medications


Medications: 


 Current Medications





Carvedilol (Coreg)  6.25 mg PO BID [facility]


   Last Admin: 08/25/18 09:13 Dose:  6.25 mg


Enoxaparin Sodium (Lovenox)  80 mg SC Q12 [facility]


   Last Admin: 08/25/18 09:13 Dose:  80 mg


Finasteride (Proscar)  5 mg PO DAILY [facility]


   Last Admin: 08/25/18 09:13 Dose:  5 mg


Furosemide (Lasix)  40 mg PO DAILY [facility]


   Last Admin: 08/25/18 09:20 Dose:  40 mg


Ciprofloxacin (Cipro 400mg/200ml Dsw)  400 mg in 200 mls @ 133 mls/hr IVPB Q12H 

[facility]


   PRN Reason: Protocol


   Last Admin: 08/25/18 05:49 Dose:  133 mls/hr


Losartan Potassium (Cozaar)  100 mg PO DAILY [facility]


   Last Admin: 08/25/18 09:13 Dose:  100 mg


Rosuvastatin Calcium (Crestor)  10 mg PO HS [facility]


   Last Admin: 08/24/18 21:43 Dose:  10 mg


Tamsulosin HCl (Flomax)  0.4 mg PO DAILY [facility]


   Last Admin: 08/25/18 09:13 Dose:  0.4 mg











- Labs


Labs: 


 





 08/25/18 06:30 





 08/24/18 07:15 











- Constitutional


Appears: Well, No Acute Distress





- Head Exam


Head Exam: NORMAL INSPECTION





- Eye Exam


Eye Exam: Normal appearance


Pupil Exam: NORMAL ACCOMODATION





- ENT Exam


ENT Exam: Normal Exam





- Neck Exam


Neck Exam: Normal Inspection





- Respiratory Exam


Respiratory Exam: Clear to Ausculation Bilateral, NORMAL BREATHING PATTERN





- Cardiovascular Exam


Cardiovascular Exam: REGULAR RHYTHM





- GI/Abdominal Exam


GI & Abdominal Exam: Soft, Normal Bowel Sounds





- Rectal Exam


Rectal Exam: Deferred





- Extremities Exam


Extremities Exam: Normal Inspection





- Back Exam


Back Exam: NORMAL INSPECTION





- Neurological Exam


Neurological Exam: Alert, Awake, Normal Gait, Oriented x3





- Psychiatric Exam


Psychiatric exam: Anxious





- Skin


Skin Exam: Dry, Intact, Normal Color, Warm





Assessment and Plan


(1) Hematuria


Status: Acute   





(2) UTI (urinary tract infection)


Assessment & Plan: 


Urine culture grows Proteus Mirabilis resistant to Cipro. To get an ID 

evaluation with DR IRAM Lopez. Discussed with Dr Burnham.


Status: Acute   





(3) Anemia associated with acute blood loss


Status: Acute

## 2018-08-25 NOTE — CP.PCM.PN
Subjective





- Date & Time of Evaluation


Date of Evaluation: 08/25/18


Time of Evaluation: 07:49





- Subjective


Subjective: 





                                             Patient seen and examined. Urine 

still blood tinged. Hgb. 9.4 . No pains. No SOB





Objective





- Vital Signs/Intake and Output


Vital Signs (last 24 hours): 


 











Temp Pulse Resp BP Pulse Ox


 


 98.0 F   84   20   104/64   97 


 


 08/25/18 07:00  08/25/18 07:00  08/25/18 07:00  08/25/18 07:00  08/25/18 07:00








Intake and Output: 


 











 08/25/18 08/25/18





 06:59 18:59


 


Intake Total 700 


 


Output Total 3300 800


 


Balance -2600 -800














- Medications


Medications: 


 Current Medications





Carvedilol (Coreg)  6.25 mg PO BID [facility]


   Last Admin: 08/24/18 18:13 Dose:  6.25 mg


Enoxaparin Sodium (Lovenox)  80 mg SC Q12 [facility]


   Last Admin: 08/24/18 21:43 Dose:  80 mg


Finasteride (Proscar)  5 mg PO DAILY [facility]


   Last Admin: 08/24/18 10:40 Dose:  5 mg


Furosemide (Lasix)  40 mg PO DAILY [facility]


   Last Admin: 08/24/18 10:40 Dose:  40 mg


Ciprofloxacin (Cipro 400mg/200ml Dsw)  400 mg in 200 mls @ 133 mls/hr IVPB Q12H 

[facility]


   PRN Reason: Protocol


   Last Admin: 08/25/18 05:49 Dose:  133 mls/hr


Losartan Potassium (Cozaar)  100 mg PO DAILY [facility]


   Last Admin: 08/24/18 10:40 Dose:  100 mg


Rosuvastatin Calcium (Crestor)  10 mg PO HS [facility]


   Last Admin: 08/24/18 21:43 Dose:  10 mg


Tamsulosin HCl (Flomax)  0.4 mg PO DAILY [facility]


   Last Admin: 08/24/18 10:40 Dose:  0.4 mg











- Labs


Labs: 


 





 08/25/18 06:30 





 08/24/18 07:15 











- Constitutional


Appears: Well, Non-toxic, No Acute Distress





- Head Exam


Head Exam: ATRAUMATIC, NORMAL INSPECTION, NORMOCEPHALIC





- Eye Exam


Eye Exam: Normal appearance


Pupil Exam: PERRL





- ENT Exam


ENT Exam: Mucous Membranes Moist, Normal External Ear Exam





- Neck Exam


Neck Exam: Full ROM





- Respiratory Exam


Respiratory Exam: Clear to Ausculation Bilateral, NORMAL BREATHING PATTERN





- Cardiovascular Exam


Cardiovascular Exam: REGULAR RHYTHM, +S1, +S2





- GI/Abdominal Exam


GI & Abdominal Exam: Soft, Normal Bowel Sounds





- Rectal Exam


Rectal Exam: Deferred





- Extremities Exam


Extremities Exam: Full ROM, Normal Inspection





- Back Exam


Back Exam: Full ROM, NORMAL INSPECTION





- Neurological Exam


Neurological Exam: Alert, Awake, Oriented x3





- Psychiatric Exam


Psychiatric exam: Normal Affect, Normal Mood





- Skin


Skin Exam: Dry, Intact, Normal Color, Warm





Assessment and Plan





- Assessment and Plan (Free Text)


Assessment: 





                                     Assessment:








             CAD





            CHF.








            Nephrolithiasis.





    


           BPH





     


          Pre diabetes.





  


          Hypertension.











           Hyperlipidemia.


Plan: 





                               Plan:











             Continue IV Cipro.





            For removal of left kidney stone i\.





            Continue Subq Lovenox.








            Continuous cardiac monitoring.

## 2018-08-26 LAB
ALBUMIN SERPL-MCNC: 3.3 G/DL (ref 3.5–5)
ALBUMIN/GLOB SERPL: 1.1 {RATIO} (ref 1–2.1)
ALT SERPL-CCNC: 21 U/L (ref 21–72)
AST SERPL-CCNC: 8 U/L (ref 17–59)
BASOPHILS # BLD AUTO: 0.1 K/UL (ref 0–0.2)
BASOPHILS NFR BLD: 0.8 % (ref 0–2)
BUN SERPL-MCNC: 22 MG/DL (ref 9–20)
CALCIUM SERPL-MCNC: 8.3 MG/DL (ref 8.6–10.4)
EOSINOPHIL # BLD AUTO: 0.1 K/UL (ref 0–0.7)
EOSINOPHIL NFR BLD: 1.9 % (ref 0–4)
ERYTHROCYTE [DISTWIDTH] IN BLOOD BY AUTOMATED COUNT: 20.8 % (ref 11.5–14.5)
GFR NON-AFRICAN AMERICAN: 47
HGB BLD-MCNC: 8.4 G/DL (ref 12–18)
LYMPHOCYTES # BLD AUTO: 1.3 K/UL (ref 1–4.3)
LYMPHOCYTES NFR BLD AUTO: 19 % (ref 20–40)
MCH RBC QN AUTO: 25.3 PG (ref 27–31)
MCHC RBC AUTO-ENTMCNC: 32.3 G/DL (ref 33–37)
MCV RBC AUTO: 78.3 FL (ref 80–94)
MONOCYTES # BLD: 0.8 K/UL (ref 0–0.8)
MONOCYTES NFR BLD: 11.3 % (ref 0–10)
NEUTROPHILS # BLD: 4.5 K/UL (ref 1.8–7)
NEUTROPHILS NFR BLD AUTO: 67 % (ref 50–75)
NRBC BLD AUTO-RTO: 0.1 % (ref 0–2)
PLATELET # BLD: 208 K/UL (ref 130–400)
PMV BLD AUTO: 9.1 FL (ref 7.2–11.7)
RBC # BLD AUTO: 3.31 MIL/UL (ref 4.4–5.9)
WBC # BLD AUTO: 6.8 K/UL (ref 4.8–10.8)

## 2018-08-26 RX ADMIN — SODIUM CHLORIDE SCH MLS/HR: 0.9 INJECTION, SOLUTION INTRAVENOUS at 10:20

## 2018-08-26 RX ADMIN — SODIUM CHLORIDE SCH MLS/HR: 0.9 INJECTION, SOLUTION INTRAVENOUS at 21:06

## 2018-08-26 NOTE — CP.PCM.CON
History of Present Illness





- History of Present Illness


History of Present Illness: 





dictated





Past Patient History





- Infectious Disease


Hx of Infectious Diseases: None





- Tetanus Immunizations


Tetanus Immunization: Unknown





- Past Medical History & Family History


Past Medical History?: Yes





- Past Social History


Smoking Status: Never Smoked





- CARDIAC


Hx Cardiac Disorders: Yes


Hx Angina: Yes


Hx Atrial Fibrillation: Yes


Hx Cardia Arrhythmia: Yes (Ventricular fibrillations x 2 in the past with acute 

MI's.)


Hx Congestive Heart Failure: Yes


Hx Heart Attack: Yes


Hx Hypercholesterolemia: Yes


Hx Hypertension: Yes


Hx Peripheral Edema: Yes (not at present)





- PULMONARY


Hx Pneumonia: Yes





- NEUROLOGICAL


Hx Neurological Disorder: No





- HEENT


Hx HEENT Problems: Yes


Hx Deafness: Yes (HEARING AIDS)


Hx Glaucoma: Yes





- RENAL


Hx Chronic Kidney Disease: Yes


Hx Kidney Stones: Yes





- ENDOCRINE/METABOLIC


Hx Endocrine Disorders: Yes (Pre diabetes/no medication)


Hx Diabetes Mellitus Type 2: Yes


Other/Comment: "borderline diabetes"





- HEMATOLOGICAL/ONCOLOGICAL


Hx Anemia: Yes (sec to blood loss.)





- INTEGUMENTARY


Hx Dermatological Problems: No





- MUSCULOSKELETAL/RHEUMATOLOGICAL


Hx Musculoskeletal Disorders: Yes


Hx Back Pain: Yes


Hx Falls: No


Hx Herniated Disk: Yes ("LOWER BACK")





- GASTROINTESTINAL


Hx Gastrointestinal Disorders: Yes


Hx Gastritis: Yes





- GENITOURINARY/GYNECOLOGICAL


Hx Genitourinary Disorders: Yes


Hx Bladder Stone: Yes (Obstructing left ureteral stone.)


Hx Hematuria: Yes


Hx Prostate Problems: Yes


Other/Comment: enlarged prostate





- PSYCHIATRIC


Hx Psychophysiologic Disorder: No


Hx Substance Use: No





- SURGICAL HISTORY


Hx Surgeries: Yes


Hx Cardiac Catheterization: Yes


Hx Coronary Stent: Yes (X4)





- ANESTHESIA


Hx Anesthesia: Yes


Hx Anesthesia Reactions: No (? HAD MI 2013 AFTER PROSTATE SURGERY-PT ALSO HAD 

BLEEDING)


Has any member of the family had a problem w/ anesthesia?: No





Meds


Allergies/Adverse Reactions: 


 Allergies











Allergy/AdvReac Type Severity Reaction Status Date / Time


 


Penicillins Allergy Unknown HAPPENED Verified 08/23/18 14:58





   AS A  





   CHILD-UNKNOWN  





   REACTION  














- Medications


Medications: 


 Current Medications





Carvedilol (Coreg)  6.25 mg PO BID [facility]


   Last Admin: 08/26/18 10:00 Dose:  Not Given


Enoxaparin Sodium (Lovenox)  80 mg SC Q12 [facility]


   Last Admin: 08/25/18 21:53 Dose:  80 mg


Finasteride (Proscar)  5 mg PO DAILY [facility]


   Last Admin: 08/26/18 10:01 Dose:  5 mg


Furosemide (Lasix)  40 mg PO DAILY [facility]


   Last Admin: 08/26/18 10:00 Dose:  Not Given


Meropenem 1 gm/ Sodium (Chloride)  100 mls @ 100 mls/hr IVPB Q8H [facility]


   PRN Reason: Protocol


   Last Admin: 08/26/18 02:00 Dose:  100 mls/hr


Amikacin Sulfate 500 mg/ (Sodium Chloride)  252 mls @ 250 mls/hr IVPB Q12H GRETCHEN


   PRN Reason: Protocol


   Last Admin: 08/26/18 10:20 Dose:  250 mls/hr


Losartan Potassium (Cozaar)  100 mg PO DAILY [facility]


   Last Admin: 08/26/18 10:00 Dose:  Not Given


Methylprednisolone (Solu-Medrol)  125 mg IV ONCE PRN


   PRN Reason: ALLERGY TO MEROPENEM


Rosuvastatin Calcium (Crestor)  10 mg PO HS [facility]


   Last Admin: 08/25/18 21:52 Dose:  10 mg


Tamsulosin HCl (Flomax)  0.4 mg PO DAILY [facility]


   Last Admin: 08/26/18 10:00 Dose:  0.4 mg











Results





- Vital Signs


Recent Vital Signs: 


 Last Vital Signs











Temp  98.2 F   08/26/18 07:53


 


Pulse  98 H  08/26/18 10:07


 


Resp  20   08/26/18 07:53


 


BP  95/59 L  08/26/18 10:07


 


Pulse Ox  98   08/26/18 07:53














- Labs


Result Diagrams: 


 08/25/18 06:30





 08/24/18 07:15

## 2018-08-26 NOTE — CP.PCM.PN
Subjective





- Date & Time of Evaluation


Date of Evaluation: 08/26/18


Time of Evaluation: 15:52





- Subjective


Subjective: 





                                            Patient seen and examined. Stable, C

/O back pians which he had in the pastr. BP is on the low running at 95 to 97 

sys.





         Coreg, Losartan and Lasix withheld due to the low BP. Urine culture 

grew P. Mirabilis, sensitive to Merem, and Amikacin.Urine slightly blood 

tinged. 





        Hgb was stable at 9.$ yeserday, will repeat today. To go for the 

procedure tomorrow.





Objective





- Vital Signs/Intake and Output


Vital Signs (last 24 hours): 


 











Temp Pulse Resp BP Pulse Ox


 


 98.2 F   97 H  18   97/58 L  98 


 


 08/26/18 13:59  08/26/18 13:59  08/26/18 13:58  08/26/18 13:59  08/26/18 07:53








Intake and Output: 


 











 08/26/18 08/26/18





 06:59 18:59


 


Intake Total 8740 7900


 


Output Total 7350 7725


 


Balance 1390 175














- Medications


Medications: 


 Current Medications





Carvedilol (Coreg)  6.25 mg PO BID [facility]


   Last Admin: 08/26/18 10:00 Dose:  Not Given


Enoxaparin Sodium (Lovenox)  80 mg SC Q12 [facility]


   Last Admin: 08/25/18 21:53 Dose:  80 mg


Finasteride (Proscar)  5 mg PO DAILY [facility]


   Last Admin: 08/26/18 10:01 Dose:  5 mg


Furosemide (Lasix)  40 mg PO DAILY [facility]


   Last Admin: 08/26/18 10:00 Dose:  Not Given


Meropenem 1 gm/ Sodium (Chloride)  100 mls @ 100 mls/hr IVPB Q8H [facility]


   PRN Reason: Protocol


   Last Admin: 08/26/18 13:51 Dose:  100 mls/hr


Amikacin Sulfate 500 mg/ (Sodium Chloride)  252 mls @ 250 mls/hr IVPB Q12H [facility]


   PRN Reason: Protocol


   Last Admin: 08/26/18 10:20 Dose:  250 mls/hr


Losartan Potassium (Cozaar)  100 mg PO DAILY [facility]


   Last Admin: 08/26/18 10:00 Dose:  Not Given


Methylprednisolone (Solu-Medrol)  125 mg IV ONCE PRN


   PRN Reason: ALLERGY TO MEROPENEM


Rosuvastatin Calcium (Crestor)  10 mg PO HS [facility]


   Last Admin: 08/25/18 21:52 Dose:  10 mg


Tamsulosin HCl (Flomax)  0.4 mg PO DAILY [facility]


   Last Admin: 08/26/18 10:00 Dose:  0.4 mg











- Labs


Labs: 


 





 08/25/18 06:30 





 08/24/18 07:15 











- Constitutional


Appears: Well, Non-toxic, No Acute Distress





- Head Exam


Head Exam: ATRAUMATIC, NORMAL INSPECTION, NORMOCEPHALIC





- Eye Exam


Eye Exam: EOMI, Normal appearance


Pupil Exam: PERRL





- ENT Exam


ENT Exam: Mucous Membranes Moist, Normal Exam





- Neck Exam


Neck Exam: Full ROM, Normal Inspection





- Respiratory Exam


Respiratory Exam: Clear to Ausculation Bilateral, NORMAL BREATHING PATTERN





- Cardiovascular Exam


Cardiovascular Exam: REGULAR RHYTHM, +S1, +S2





- GI/Abdominal Exam


GI & Abdominal Exam: Soft, Normal Bowel Sounds





- Rectal Exam


Rectal Exam: Deferred





- Extremities Exam


Extremities Exam: Full ROM, Normal Inspection





- Back Exam


Back Exam: Full ROM





- Neurological Exam


Neurological Exam: Alert, Awake, Oriented x3





- Psychiatric Exam


Psychiatric exam: Normal Affect, Normal Mood





- Skin


Skin Exam: Dry, Intact, Normal Color, Warm





Assessment and Plan





- Assessment and Plan (Free Text)


Assessment: 





                                       Assessment:








             CAD





            Nephrolithiasis,





       


            Htn





            Hyoerlipidemia.





           Prediabetes.





          CHF


Plan: 





                                            Plan:








            Continue IV antibiotics-Amikacin and MErem.





            For lithotripsy in AM

## 2018-08-27 LAB
ALBUMIN SERPL-MCNC: 3.2 G/DL (ref 3.5–5)
ALBUMIN/GLOB SERPL: 1 {RATIO} (ref 1–2.1)
ALT SERPL-CCNC: 20 U/L (ref 21–72)
AST SERPL-CCNC: 10 U/L (ref 17–59)
BASOPHILS # BLD AUTO: 0.1 K/UL (ref 0–0.2)
BASOPHILS NFR BLD: 0.7 % (ref 0–2)
BUN SERPL-MCNC: 19 MG/DL (ref 9–20)
CALCIUM SERPL-MCNC: 8.2 MG/DL (ref 8.6–10.4)
EOSINOPHIL # BLD AUTO: 0.2 K/UL (ref 0–0.7)
EOSINOPHIL NFR BLD: 2.4 % (ref 0–4)
ERYTHROCYTE [DISTWIDTH] IN BLOOD BY AUTOMATED COUNT: 20.3 % (ref 11.5–14.5)
ERYTHROCYTE [SEDIMENTATION RATE] IN BLOOD: 50 MM/HR (ref 0–15)
GFR NON-AFRICAN AMERICAN: 51
HGB BLD-MCNC: 9.1 G/DL (ref 12–18)
LYMPHOCYTES # BLD AUTO: 1.3 K/UL (ref 1–4.3)
LYMPHOCYTES NFR BLD AUTO: 17.2 % (ref 20–40)
MCH RBC QN AUTO: 26.4 PG (ref 27–31)
MCHC RBC AUTO-ENTMCNC: 33.2 G/DL (ref 33–37)
MCV RBC AUTO: 79.5 FL (ref 80–94)
MONOCYTES # BLD: 0.9 K/UL (ref 0–0.8)
MONOCYTES NFR BLD: 11.5 % (ref 0–10)
NEUTROPHILS # BLD: 5 K/UL (ref 1.8–7)
NEUTROPHILS NFR BLD AUTO: 68.2 % (ref 50–75)
NRBC BLD AUTO-RTO: 0 % (ref 0–2)
PLATELET # BLD: 219 K/UL (ref 130–400)
PMV BLD AUTO: 9.3 FL (ref 7.2–11.7)
RBC # BLD AUTO: 3.46 MIL/UL (ref 4.4–5.9)
WBC # BLD AUTO: 7.4 K/UL (ref 4.8–10.8)

## 2018-08-27 PROCEDURE — 0T9B8ZZ DRAINAGE OF BLADDER, VIA NATURAL OR ARTIFICIAL OPENING ENDOSCOPIC: ICD-10-PCS | Performed by: UROLOGY

## 2018-08-27 PROCEDURE — BT1FZZZ FLUOROSCOPY OF LEFT KIDNEY, URETER AND BLADDER: ICD-10-PCS | Performed by: UROLOGY

## 2018-08-27 PROCEDURE — 0TC18ZZ EXTIRPATION OF MATTER FROM LEFT KIDNEY, VIA NATURAL OR ARTIFICIAL OPENING ENDOSCOPIC: ICD-10-PCS | Performed by: UROLOGY

## 2018-08-27 PROCEDURE — 0T778DZ DILATION OF LEFT URETER WITH INTRALUMINAL DEVICE, VIA NATURAL OR ARTIFICIAL OPENING ENDOSCOPIC: ICD-10-PCS | Performed by: UROLOGY

## 2018-08-27 PROCEDURE — 0TC48ZZ EXTIRPATION OF MATTER FROM LEFT KIDNEY PELVIS, VIA NATURAL OR ARTIFICIAL OPENING ENDOSCOPIC: ICD-10-PCS | Performed by: UROLOGY

## 2018-08-27 RX ADMIN — ENOXAPARIN SODIUM SCH MG: 80 INJECTION SUBCUTANEOUS at 22:59

## 2018-08-27 RX ADMIN — SODIUM FERRIC GLUCONATE COMPLEX SCH MLS/HR: 12.5 INJECTION INTRAVENOUS at 17:39

## 2018-08-27 RX ADMIN — SODIUM CHLORIDE SCH: 0.9 INJECTION, SOLUTION INTRAVENOUS at 10:21

## 2018-08-27 NOTE — RAD
Date of service: 



08/27/2018



PROCEDURE:  Intraoperative fluoroscopy



HISTORY:

LT. RENAL CALCULI



COMPARISON:

Not available



TECHNIQUE:

Intraoperative fluoroscopy was provided for left ureteroscopy. Total 

time of fluoroscopy was 131.9 seconds.  Cumulative dose 1.53 mGym2.



FINDINGS:

Multiple fluoroscopic spot films are submitted.



IMPRESSION:

Fluoroscopy provided.

## 2018-08-27 NOTE — CON
Copied To:  Artur Lomas MD

Attending MD:  Artur Lomas MD



DATE:  08/26/2018



INFECTIOUS DISEASE CONSULT



REQUESTED BY:  Dr. Burnham and Dr. Spivey.  This consult was requested of

Dr. Lopez and I am covering her _____.



HISTORY OF PRESENT ILLNESS:  This patient is a 67-year-old male.  He has

positive urine cultures and I am asked to evaluate him.  He is allergic to

penicillin.  He is on CBI at this time.  He has a history of stones and he

is for a procedure tomorrow for cystoscopy by Dr. Oakes, and he does tell

me that he was recently discharged after a partial prostatectomy done for

BPH, and he also had two urinary bladder tumors removed and he is with CBI

at this time.  He denies any fever and chills.  He is allergic to

penicillin.  He was hesitant to take meropenem; however the last night, the

nurse gave it to him, and he did not have any reactions.  He is also on

amikacin at this time, and I will go through his medications.  He denies

any pain.  He is with the CBI and the urine still looks reddish, so he may

still have some blood tinged.



ALLERGIES:  HE IS ALLERGIC TO PENICILLIN THAT I KNOW OF.



PAST MEDICAL HISTORY:  Significant for repeated Proteus infection as I see

on the culture reports here.



SOCIAL HISTORY:  We went through.  He never smoked.  No chewing tobacco. 

No cigars.  No alcohol.  No drugs.  Has no home situations.



REVIEW OF SYSTEMS:  Heart, he has had history of angina, atrial

fibrillation, also cardiac arrhythmias and CHF, history of heart attack,

high cholesterol, hypertension.  Has a history of peripheral edema in the

past.  He also has pulmonary issues.  No neurological problems.  He uses

hearing aid as he has deafness.  He also has a history of glaucoma and eye

issues.  Has kidney issues with kidney stones and chronic kidney disease. 

He is prediabetic.  He has diabetes type 2.  History of anemia also

present.  He has joint problems, back pain, history of fall, herniated

disk.  GI wise, he has a history of gastritis.  , he has disorders of

_____, urethral stone and he is having hematuria.  He also had enlarged

prostate, underwent t surgery.  He also had cardiac cath, and he has four

stents at this time.  No history of substance abuse.  His family is at the

bedside.  He is allergic to penicillin.  He says he almost passed out and

fall, so I would think his penicillin allergy is _____.



He denies any headache.  No ear, nose, throat problems.  Right now, no

chest pain.  No shortness of breath.  No abdominal pain.  No nausea.  No

vomiting.  He is having no leg edema at this time.  He is with CBI.



MEDICATIONS:  At this time, I gave him last night, he was not going to take

Merrem, so I have put him on amikacin which is 500 mg every 12 hours but we

will look into kidney functions.  Coreg, Lovenox, Proscar, Lasix, Cozaar. 

Half of the medicines are missing at this time, not given.  He is on

meropenem.  I gave him 1 g every 8 hours.  We will look into the

creatinine.  Just Solu-Medrol which is kept it by the bedside, not given,

not needed.  Crestor and he is on Flomax.



PHYSICAL EXAMINATION:

VITAL SIGNS:  T-max is 98. blood pressure is 95/59, heart rate is 98, and

he is afebrile, respirations are 20.

HEENT:  Head is atraumatic, normocephalic.  Pupils are reacting to light. 

Mild pallor present.

NECK:  Supple.  JVP is flat.

LUNGS:  Clear.  No crackles or rales heard.

HEART:  S1, S2 are regular.  No murmurs appreciated.

ABDOMEN:  Soft, nontender.  No guarding, no rigidity present.

EXTREMITIES:  Have no edema.



LABORATORY DATA:  His white count is 7.9, this is from yesterday;

hemoglobin 9.4; hematocrit 28.8; platelet count is 228.  Chemistry shows

sodium 141, potassium 3.9, chlorides are 109, and glucose was 150.  UA

showed lot of wbc's 679 and rbc's 741.  He has a history of stones.  Micro

wise, his urine culture had Proteus, and this Proteus is not listed as ESBL

positive or negative, but it is ertapenem and meropenem sensitive and Zosyn

sensitive and amikacin resistant.  _____ 1.1.



ASSESSMENT AND PLAN:  At this time, he has a complicated urinary tract

infection with Proteus as well as drug allergy.  Has a history of kidney

stones.  At this time, I will continue meropenem as ordered and probably

discontinue amikacin after the surgery tomorrow, and we will order labs

tomorrow and will follow.  The patient does have resistant organism and has

kidney stones.  I do not see any report of any CAT scan, but will select

all with this.  He did not have any recent here.  He denies he has any

hydronephrosis.  He did have a benign prostatic hypertrophy surgery

recently with urinary and bladder tumor.  So, we will continue present

antibiotics and will follow.  He is diabetic and he had no reactions to

meropenem.





__________________________________________

Artur Lomas MD





DD:  08/26/2018 11:24:47

DT:  08/26/2018 19:27:10

Job # [id number]

## 2018-08-27 NOTE — RAD
Date of service: 



08/27/2018



HISTORY:

LT. RENAL CALCULI  



COMPARISON:

8/6/2018 



FINDINGS:



BOWEL:

 possible calculus lower pole left kidney.  New left ureteral stent. 

There is a small focal radiopacity projecting over the upper portion 

of the left ureteral stent at the level of the left renal pelvis 

which looks to be metallic although this may represent film artifact. 

 Follow-up advised.   Normal bowel gas pattern. 



BONES:

Normal.



OTHER FINDINGS:

None.



IMPRESSION:

Status post left ureteral stent placement.  Possible calculus lower 

pole left kidney.  Small metallic radiopacity projecting over upper 

aspect left ureteral stent.  Followup advised.  Metallic foreign body 

versus artifact.

## 2018-08-27 NOTE — PCM.SURG1
Surgeon's Initial Post Op Note





- Surgeon's Notes


Surgeon: STEFAN Oakes


Assistant: none


Type of Anesthesia: General LMA


Pre-Operative Diagnosis: Hematuria.  Urolithiasis


Operative Findings: same, BPH


Post-Operative Diagnosis: same


Operation Performed: cysto, evacuation of clot.  L uretero-renoscopy. laser 

lithotrispy, stone basketing.  RTG pyelogram, stent insertion, eua


Specimen/Specimens Removed: stones


Estimated Blood Loss: EBL {In ML}: 10


Blood Products Given: N/A


Post-Op Condition: Good


Date of Surgery/Procedure: 08/27/18


Time of Surgery/Procedure: 11:31

## 2018-08-27 NOTE — CP.PCM.PN
Subjective





- Date & Time of Evaluation


Date of Evaluation: 08/27/18


Time of Evaluation: 16:46





- Subjective


Subjective: 





                                              Patient had cysto, stone basketing

, laser lithotripsy , ureteronephogram and evacuatione of blood clots done.He





                              tolerated the procedure very well. On Iv Merem 

and Amikacin. Hgb is 9.4 today. was 8 yesterday  Discussed with Dr. Bush.





                             Will have to give the antibiotics x 7 days post 

procedure.





Objective





- Vital Signs/Intake and Output


Vital Signs (last 24 hours): 


 











Temp Pulse Resp BP Pulse Ox


 


 97.9 F   90   20   117/68   97 


 


 08/27/18 15:50  08/27/18 15:50  08/27/18 15:50  08/27/18 15:50  08/27/18 15:50








Intake and Output: 


 











 08/27/18 08/27/18





 06:59 18:59


 


Intake Total 6625 3650


 


Output Total 6775 1155


 


Balance -150 2495














- Medications


Medications: 


 Current Medications





Carvedilol (Coreg)  6.25 mg PO BID [facility]


   Last Admin: 08/27/18 10:21 Dose:  Not Given


Enoxaparin Sodium (Lovenox)  80 mg SC Q12 [facility]


   Last Admin: 08/25/18 21:53 Dose:  80 mg


Finasteride (Proscar)  5 mg PO DAILY [facility]


   Last Admin: 08/27/18 10:22 Dose:  Not Given


Furosemide (Lasix)  40 mg PO DAILY [facility]


   Last Admin: 08/27/18 10:21 Dose:  Not Given


Meropenem 1 gm/ Sodium (Chloride)  100 mls @ 100 mls/hr IVPB Q8H [facility]


   PRN Reason: Protocol


   Last Admin: 08/27/18 11:08 Dose:  Not Given


Amikacin Sulfate 500 mg/ (Sodium Chloride)  252 mls @ 250 mls/hr IVPB Q12H [facility]


   PRN Reason: Protocol


   Last Admin: 08/27/18 10:21 Dose:  Not Given


Losartan Potassium (Cozaar)  100 mg PO DAILY [facility]


   Last Admin: 08/27/18 10:21 Dose:  Not Given


Methylprednisolone (Solu-Medrol)  125 mg IV ONCE PRN


   PRN Reason: ALLERGY TO MEROPENEM


Rosuvastatin Calcium (Crestor)  10 mg PO HS [facility]


   Last Admin: 08/26/18 21:10 Dose:  10 mg


Tamsulosin HCl (Flomax)  0.4 mg PO DAILY [facility]


   Last Admin: 08/27/18 10:21 Dose:  Not Given











- Labs


Labs: 


 





 08/27/18 07:11 





 08/27/18 07:11 











- Constitutional


Appears: Well, Non-toxic, No Acute Distress





- Head Exam


Head Exam: ATRAUMATIC, NORMAL INSPECTION





- Eye Exam


Eye Exam: EOMI, Normal appearance


Pupil Exam: PERRL





- ENT Exam


ENT Exam: Mucous Membranes Moist, Normal Exam





- Neck Exam


Neck Exam: Full ROM, Normal Inspection





- Respiratory Exam


Respiratory Exam: Clear to Ausculation Bilateral, NORMAL BREATHING PATTERN





- Cardiovascular Exam


Cardiovascular Exam: REGULAR RHYTHM, +S1, +S2





- GI/Abdominal Exam


GI & Abdominal Exam: Soft, Normal Bowel Sounds





- Rectal Exam


Rectal Exam: Deferred





- Skin


Skin Exam: Dry, Intact, Normal Color





Assessment and Plan


(1) Hematuria


Status: Acute   





(2) UTI (urinary tract infection)


Status: Acute   





(3) Anemia


Status: Chronic   





(4) CAD (coronary artery disease)


Status: Chronic   





(5) Coronary artery disease


Status: Chronic   





(6) Hypertension


Status: Chronic   





(7) Congestive heart failure (CHF)


Status: Chronic   





(8) Pre-diabetes


Status: Chronic   





- Assessment and Plan (Free Text)


Plan: 





                                        Plan:





        Continue IV Merem.





       Continue Brilinta.

## 2018-08-27 NOTE — CON
Copied To:  Glory Oakes MD

Attending MD:  Glroy Oakes MD



DATE:  08/27/2018



UROLOGY CONSULTATION



REQUESTED BY:  Kiley Burnham MD



Urology consultation is filled by Dr. Glory Oakes.



REASON FOR CONSULTATION:  Hematuria.



The patient is a 67-year-old male with hematuria.



The patient is in otherwise fair health.  The patient presents with

several-day history of hematuria.  The hematuria was intermittent at first.

The hematuria has increased over the past two days.  The patient is now

admitted for further evaluation and therapy.



The patient also noted that he was urinating with good flow until today. 

Today, he reports that the flow was weak and he had urinary frequency.



The patient has history of urolithiasis.  The patient has left renal

stones.  He has been treated with left renal stones in the past.  He now

has a branching lower pole renal calculus.



The patient had cystoscopy performed on 08/06/2018.  The intent was to do

cystoscopy and left ureteroscopy.  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  He was found to have marked

enlargement of the prostate.  The patient had resection of the tumor.  The

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  The patient

also required transurethral resection of bladder tumor that was obstructing

the access into the left ureter.  The patient had that site of the prostate

resected as well.  Pathology returned as BPH.



The patient is also noted to have right renal calculi as well on CT scan.



Mr. Sumner has a significant cardiac history.  The patient has had previous

coronary artery disease.  He has had previous myocardial infarction.  The

patient has had previous coronary angioplasty as well as stent insertion. 

The patient also has had repeat angioplasty.



The patient has hematuria in the past secondary to prostatic disease.  The

patient required cystostomy tube insertion in the past.



Due to the hematuria, the patient is admitted for further evaluation and

therapy.



The patient has been treated with Brilinta.  The Brilinta has been held.



There was no recent chest pain.  No recent fever or rigors.  The patient

has occasional flank pain.  The patient has occasional dysuria.



The patient was noted to have a 5 mm stone in the right lower pole.  There

was also an approximately 2.3 cm stone in the left kidney.



The patient's medications have included Brilinta, carvedilol, and aspirin. 

The patient's medications also included tamsulosin and dutasteride.



PHYSICAL EXAMINATION:

GENERAL:  The patient is a well-developed, well-nourished male appearing

his stated age.  The patient is awake and alert.

ABDOMEN:  Soft.  Mildly distended.  Mild suprapubic tenderness.

GENITOURINARY:  Genitalia:  Without inflammation.  The urine in the urinal

demonstrates hematuria.



IMPRESSION:  Hematuria.  Urolithiasis.  Recent focal transurethral

resection of the prostate.  Coronary artery disease. 

Coagulopathy/antiplatelet therapy.



RECOMMENDATIONS AND PLAN:  Hold Brilinta.  Monitor bleeding.  Seo

catheter.  Bladder irrigation.  Cystoscopy to follow.  Possible

ureteroscopy.



Further therapy to follow according to the patient's clinical course as

well as results of above.



I will discuss the findings further with you.



Thank you for recommending patient for urology consultation.





__________________________________________

Glory Oakes MD





cc:  MD Dr. Allyssa Oakley



DD:  08/26/2018 17:56:21

DT:  08/26/2018 18:00:06

Job # [id number]

## 2018-08-27 NOTE — CP.PCM.PN
Subjective





- Date & Time of Evaluation


Date of Evaluation: 08/27/18


Time of Evaluation: 13:30





- Subjective


Subjective: 





Patient had cystoscopy, laser lithotrypsy and ureteral stent insertion this AM. 

Has no complaint now. Will restart Brillinta PO tonight, if Dr Oakes agrees.





Objective





- Vital Signs/Intake and Output


Vital Signs (last 24 hours): 


 











Temp Pulse Resp BP Pulse Ox


 


 98.4 F   87   18   130/69   99 


 


 08/27/18 20:00  08/27/18 20:00  08/27/18 20:00  08/27/18 20:00  08/27/18 20:00








Intake and Output: 


 











 08/27/18 08/28/18





 18:59 06:59


 


Intake Total 3900 820


 


Output Total 1305 650


 


Balance 2595 170














- Medications


Medications: 


 Current Medications





Carvedilol (Coreg)  6.25 mg PO BID [facility]


   Last Admin: 08/27/18 17:38 Dose:  6.25 mg


Enoxaparin Sodium (Lovenox)  80 mg SC Q12 [facility]


   Last Admin: 08/27/18 22:59 Dose:  80 mg


Finasteride (Proscar)  5 mg PO DAILY [facility]


   Last Admin: 08/27/18 10:22 Dose:  Not Given


Furosemide (Lasix)  40 mg PO DAILY [facility]


   Last Admin: 08/27/18 10:21 Dose:  Not Given


Meropenem 1 gm/ Sodium (Chloride)  100 mls @ 100 mls/hr IVPB Q8H [facility]


   PRN Reason: Protocol


   Last Admin: 08/27/18 18:48 Dose:  100 mls/hr


Ferric Sodium Gluconate Complex 125 mg/ Sodium Chloride  110 mls @ 100 mls/hr 

IVPB DAILY [facility]


   Stop: 09/04/18 18:01


   Last Admin: 08/27/18 17:39 Dose:  100 mls/hr


Losartan Potassium (Cozaar)  100 mg PO DAILY [facility]


   Last Admin: 08/27/18 10:21 Dose:  Not Given


Methylprednisolone (Solu-Medrol)  125 mg IV ONCE PRN


   PRN Reason: ALLERGY TO MEROPENEM


Rosuvastatin Calcium (Crestor)  10 mg PO HS [facility]


   Last Admin: 08/27/18 21:23 Dose:  10 mg


Tamsulosin HCl (Flomax)  0.4 mg PO DAILY [facility]


   Last Admin: 08/27/18 10:21 Dose:  Not Given











- Labs


Labs: 


 





 08/27/18 07:11 





 08/27/18 07:11 











- Constitutional


Appears: Well, No Acute Distress





- Head Exam


Head Exam: NORMAL INSPECTION





- Eye Exam


Eye Exam: Normal appearance





- ENT Exam


ENT Exam: Normal Exam





- Neck Exam


Neck Exam: Normal Inspection





- Cardiovascular Exam


Cardiovascular Exam: REGULAR RHYTHM





- GI/Abdominal Exam


GI & Abdominal Exam: Soft, Normal Bowel Sounds





- Rectal Exam


Rectal Exam: Deferred





- Extremities Exam


Extremities Exam: Normal Inspection





- Back Exam


Back Exam: NORMAL INSPECTION





- Neurological Exam


Neurological Exam: Alert, Awake, Oriented x3





- Psychiatric Exam


Psychiatric exam: Anxious





- Skin


Skin Exam: Dry, Intact, Warm





Assessment and Plan


(1) Hematuria


Assessment & Plan: 


Ureteral stones, BPH. S/p Laser lithotrypsy and ureteral stent insertion.


Status: Acute   





(2) UTI (urinary tract infection)


Assessment & Plan: 


On IV Amikacin.


Status: Acute   





(3) Anemia associated with acute blood loss


Assessment & Plan: 


Had transfusion of one unit of PRC pre-op last night.


Status: Acute

## 2018-08-28 LAB
BUN SERPL-MCNC: 13 MG/DL (ref 9–20)
CALCIUM SERPL-MCNC: 8.2 MG/DL (ref 8.6–10.4)
ERYTHROCYTE [DISTWIDTH] IN BLOOD BY AUTOMATED COUNT: 19.9 % (ref 11.5–14.5)
GFR NON-AFRICAN AMERICAN: > 60
HGB BLD-MCNC: 8.6 G/DL (ref 12–18)
MCH RBC QN AUTO: 26.3 PG (ref 27–31)
MCHC RBC AUTO-ENTMCNC: 33.4 G/DL (ref 33–37)
MCV RBC AUTO: 78.8 FL (ref 80–94)
PLATELET # BLD: 206 K/UL (ref 130–400)
PMV BLD AUTO: 9 FL (ref 7.2–11.7)
RBC # BLD AUTO: 3.28 MIL/UL (ref 4.4–5.9)
WBC # BLD AUTO: 7.8 K/UL (ref 4.8–10.8)

## 2018-08-28 RX ADMIN — ENOXAPARIN SODIUM SCH MG: 80 INJECTION SUBCUTANEOUS at 10:27

## 2018-08-28 RX ADMIN — ENOXAPARIN SODIUM SCH MG: 80 INJECTION SUBCUTANEOUS at 21:44

## 2018-08-28 RX ADMIN — SODIUM FERRIC GLUCONATE COMPLEX SCH MLS/HR: 12.5 INJECTION INTRAVENOUS at 10:26

## 2018-08-28 NOTE — PN
Copied To:  Artur Lomas MD

Attending MD:  Artur Lomas MD



DATE:  08/28/2018



SUBJECTIVE:  The patient is feeling lot better.  He had procedure done with

Dr. Oakes on 08/27/2018, which was cysto evacuation of clots.  He also

had a left ureterorenoscopy, laser lithotripsies, stone basketing, and he

had a retrograde pyelogram stent insertion under anesthesia and the patient

still has some mild back pain, otherwise he is feeling better.  He denies

any other complaints.  He has been tolerating Merrem.  I discontinued the

amikacin yesterday.



PHYSICAL EXAMINATION:

VITAL SIGNS:  T-max is 98.6, pulse is 86, blood pressure is 114/66,

respirations are 20.

HEENT:  Head is atraumatic and normocephalic.

NECK:  Supple.

LUNGS:  Clear.

HEART:  S1 and S2 are regular.

ABDOMEN:  Soft and nontender.  No guarding.  No rigidity present.

EXTREMITIES:  Have no edema.  He still has Seo catheter and it has still

blood mixed urine.



LABORATORY DATA:  Labs are noted.  Labs show white count is 7.8, hemoglobin

8.6, hematocrit 25.9, platelet count is 206.  Hemoglobin has decreased from

9.1 and 8.4 before.  Chemistry shows his creatinine is 7.1, BUN is 13 and

leave him on meropenem for another seven days as discussed with Dr. Burnham as he has left.  He still has stent and he has swelling of the

left kidney.



IMPRESSION AND PLAN:  He came in with the hematuria and he has Proteus in

the urine with complicated with stones and had stone removal and we will

follow.







__________________________________________

Artur Lomas MD



DD:  08/28/2018 12:56:20

DT:  08/28/2018 12:58:15

Job # [id number]

## 2018-08-28 NOTE — OP
Copied To:  Glory Oakes MD

Attending MD:  Glory Oakes MD



PROCEDURE DATE:  08/27/2018



UROLOGY OPERATIVE REPORT



PREOPERATIVE DIAGNOSES:  Hematuria.  Left renal calculus.



POSTOPERATIVE DIAGNOSES:  Hematuria.  Left renal calculus.



PROCEDURE:  Cystoscopy.  Evacuation of bladder clots.  Left

ureterorenoscopy.  Left laser renal lithotripsy.  Left renal stone

basketing.  Left retrograde pyelogram.  Insertion of left ureteral stent.



OPERATING SURGEON:  Glory Oakes MD.



DESCRIPTION OF PROCEDURE:  The patient received perioperative antibiotics. 

The patient was placed in lithotomy position.   film of the abdomen

was obtained..  The left ureteral stent was in proper position.  There was

a faint radiodensity identified over the lower pole of the left kidney.



General anesthesia was administered.  The patient was placed in lithotomy

position.  Genitalia prepped and draped sterilely.



A 22-Djiboutian cystoscope sheath was introduced under direct vision.  Urethra,

prostate, and bladder were inspected.



FINDINGS:  There was no stricture in the anterior urethra.  There was

evidence of a prostatic hypertrophy.  There was predominant lateral lobe

hypertrophy, and additionally, there was middle lobe hypertrophy with

intravesical intrusion.  There were clots within the bladder.  These clots

within the bladder were irrigated free using Jasper syringe.  The bladder

was reinspected.  The stent was identified.  The left ureteral orifice was

identified.



A 0.035-inch guidewire was inserted into the ureteral orifice adjacent to

the stent.  The wire was passed up to level of kidney.



Procedure was formed under fluoroscopic control as well as video endoscopic

control.



The cystoscope was removed.  The cystoscope was then reintroduced adjacent

to the guidewire.  The stent was identified and grasped with rigid grasping

forceps and brought to the distal end of the cystoscope sheath.



A 0.035-inch guidewire was inserted into the ureteral stent and passed up

to level of kidney.  The stent was removed.



A ureteral access sheath was then passed over the working wire after the

cystoscope was removed.  The ureteral access sheath was passed in

atraumatic fashion under fluoroscopic control into the ureter.



Ureteroscopy was performed with the 7-Djiboutian flexible ureteroscope.  The

ureteroscope was passed under fluoroscopic and video endoscopic control.



The kidney was inspected.  There was noted to be stone within the renal

pelvis.  The stone within the renal pelvis was fragmented using the holmium

laser and the 365 micron fiber.  There was excellent fragmentation into

many small semi particles.  Multiple small fragments were removed as well

using the tip stone basket.



The upper pole was inspected.  There were no stones.  The middle and lower

poles were inspected.  There was noted to be further stones.



Some of the middle and renal pelvic stones were able to be fragmented with

the 365 micron fiber.  The lower pole could be accessed only with the 200

micron fiber.  Fragmentation of lower pole stones was similarly performed. 

The stone was too large to be transposed from the lower pole into the

pelvis or upper pole.



Incomplete fragmentation of the lower pole stone was performed due to the

inability to deflect the scope with the fiber in place.



The retrograde ureteropyelogram was performed.  Iodinated contrast dye was

instilled via the ureteroscope.  The ureteroscope and the ureteral access

sheath were removed under direct visual control.  There was no damage to

the ureteral mucosa.



A ureteral 6-Djiboutian stent was inserted over the remaining guidewire. 

Proper stent position was confirmed with fluoroscopy and endoscopy.  The

guidewire was removed and stent was left in place.



The bladder was inspected and confirmed the above findings.



The cystoscope and sheath removed.  Seo catheter was inserted.  Bladder

drainage was clear.



Exam under anesthesia was performed.  There was no abnormal pelvic mass,

fixation, or induration.  Prostate was enlarged.  Prostate was supple and

smooth.  Prostate was approximately 50 g in size.



The patient tolerated the procedure without complications.



__________________________________________

Glory Oakes MD





DD:  08/28/2018 7:43:57

DT:  08/28/2018 7:48:20

Job # [id number]

## 2018-08-28 NOTE — CP.PCM.PN
Subjective





- Date & Time of Evaluation


Date of Evaluation: 08/28/18


Time of Evaluation: 16:30





- Subjective


Subjective: 





Seo was removed. Patient has no complaint. On IV Amikacin for UTI and Lovenox.





Objective





- Vital Signs/Intake and Output


Vital Signs (last 24 hours): 


 











Temp Pulse Resp BP Pulse Ox


 


 97.5 F L  90   18   104/63   99 


 


 08/28/18 15:30  08/28/18 15:30  08/28/18 15:30  08/28/18 15:30  08/28/18 15:30








Intake and Output: 


 











 08/28/18 08/29/18





 18:59 06:59


 


Intake Total 120 2100


 


Output Total 350 800


 


Balance -230 1300














- Medications


Medications: 


 Current Medications





Carvedilol (Coreg)  6.25 mg PO BID [facility]


   Last Admin: 08/28/18 17:49 Dose:  6.25 mg


Enoxaparin Sodium (Lovenox)  80 mg SC Q12 [facility]


   Last Admin: 08/28/18 21:44 Dose:  80 mg


Finasteride (Proscar)  5 mg PO DAILY [facility]


   Last Admin: 08/28/18 10:26 Dose:  5 mg


Furosemide (Lasix)  40 mg PO DAILY [facility]


   Last Admin: 08/28/18 10:26 Dose:  40 mg


Meropenem 1 gm/ Sodium (Chloride)  100 mls @ 100 mls/hr IVPB Q8H [facility]


   PRN Reason: Protocol


   Last Admin: 08/28/18 19:19 Dose:  100 mls/hr


Ferric Sodium Gluconate Complex 125 mg/ Sodium Chloride  110 mls @ 100 mls/hr 

IVPB DAILY [facility]


   Stop: 09/04/18 18:01


   Last Admin: 08/28/18 10:26 Dose:  100 mls/hr


Losartan Potassium (Cozaar)  100 mg PO DAILY [facility]


   Last Admin: 08/28/18 10:26 Dose:  100 mg


Methylprednisolone (Solu-Medrol)  125 mg IV ONCE PRN


   PRN Reason: ALLERGY TO MEROPENEM


Rosuvastatin Calcium (Crestor)  10 mg PO HS [facility]


   Last Admin: 08/28/18 21:44 Dose:  10 mg


Tamsulosin HCl (Flomax)  0.4 mg PO DAILY [facility]


   Last Admin: 08/28/18 10:26 Dose:  0.4 mg











- Labs


Labs: 


 





 08/28/18 08:16 





 08/28/18 08:16 











- Constitutional


Appears: No Acute Distress





- Head Exam


Head Exam: NORMAL INSPECTION, NORMOCEPHALIC





- Eye Exam


Eye Exam: Normal appearance


Pupil Exam: NORMAL ACCOMODATION





- ENT Exam


ENT Exam: Normal Exam





- Neck Exam


Neck Exam: Normal Inspection





- Respiratory Exam


Respiratory Exam: Clear to Ausculation Bilateral, NORMAL BREATHING PATTERN





- Cardiovascular Exam


Cardiovascular Exam: REGULAR RHYTHM





- GI/Abdominal Exam


GI & Abdominal Exam: Soft, Normal Bowel Sounds





- Rectal Exam


Rectal Exam: Deferred





-  Exam


 Exam: NORMAL INSPECTION





- Extremities Exam


Extremities Exam: Normal Inspection





- Back Exam


Back Exam: NORMAL INSPECTION





- Neurological Exam


Neurological Exam: Alert, Awake, Oriented x3





- Psychiatric Exam


Psychiatric exam: Anxious





- Skin


Skin Exam: Dry, Intact, Normal Color, Warm





Assessment and Plan


(1) Hematuria


Assessment & Plan: 


S/p Laser lithotrypsy and ureteral stent insertion.


Status: Acute   





(2) UTI (urinary tract infection)


Assessment & Plan: 


On IV Amikacin.


Status: Acute   





(3) Anemia associated with acute blood loss


Status: Acute

## 2018-08-28 NOTE — CP.PCM.PN
Subjective





- Date & Time of Evaluation


Date of Evaluation: 08/28/18


Time of Evaluation: 12:25





- Subjective


Subjective: 





dictated





Objective





- Vital Signs/Intake and Output


Vital Signs (last 24 hours): 


 











Temp Pulse Resp BP Pulse Ox


 


 98.6 F   86   20   114/66   98 


 


 08/28/18 07:00  08/28/18 12:18  08/28/18 07:00  08/28/18 12:18  08/28/18 07:00








Intake and Output: 


 











 08/28/18 08/28/18





 06:59 18:59


 


Intake Total 1170 120


 


Output Total 1050 350


 


Balance 120 -230














- Medications


Medications: 


 Current Medications





Carvedilol (Coreg)  6.25 mg PO BID [facility]


   Last Admin: 08/28/18 10:26 Dose:  6.25 mg


Enoxaparin Sodium (Lovenox)  80 mg SC Q12 [facility]


   Last Admin: 08/28/18 10:27 Dose:  80 mg


Finasteride (Proscar)  5 mg PO DAILY [facility]


   Last Admin: 08/28/18 10:26 Dose:  5 mg


Furosemide (Lasix)  40 mg PO DAILY [facility]


   Last Admin: 08/28/18 10:26 Dose:  40 mg


Meropenem 1 gm/ Sodium (Chloride)  100 mls @ 100 mls/hr IVPB Q8H [facility] of [facility]


   PRN Reason: Protocol


   Last Admin: 08/28/18 10:26 Dose:  100 mls/hr


Ferric Sodium Gluconate Complex 125 mg/ Sodium Chloride  110 mls @ 100 mls/hr 

IVPB DAILY [facility]


   Stop: 09/04/18 18:01


   Last Admin: 08/28/18 10:26 Dose:  100 mls/hr


Losartan Potassium (Cozaar)  100 mg PO DAILY On [facility] of [facility]


   Last Admin: 08/28/18 10:26 Dose:  100 mg


Methylprednisolone (Solu-Medrol)  125 mg IV ONCE PRN


   PRN Reason: ALLERGY TO MEROPENEM


Rosuvastatin Calcium (Crestor)  10 mg PO HS [facility] of [facility]


   Last Admin: 08/27/18 21:23 Dose:  10 mg


Tamsulosin HCl (Flomax)  0.4 mg PO DAILY [facility] of [facility]


   Last Admin: 08/28/18 10:26 Dose:  0.4 mg











- Labs


Labs: 


 





 08/28/18 08:16 





 08/28/18 08:16

## 2018-08-28 NOTE — CP.PCM.PN
Subjective





- Date & Time of Evaluation


Date of Evaluation: 08/28/18


Time of Evaluation: 10:51





- Subjective


Subjective: 





                       2nd day post procedure. Afebrile. Hgb. 8.6 on 1 unit of 

PRBC pre  procedure. Still on Lovenox. Urine is slightly blood tinged today.





             Vital signs stable.














Objective





- Vital Signs/Intake and Output


Vital Signs (last 24 hours): 


 











Temp Pulse Resp BP Pulse Ox


 


 98.6 F   83   20   112/73   98 


 


 08/28/18 07:00  08/28/18 07:00  08/28/18 07:00  08/28/18 10:26  08/28/18 07:00








Intake and Output: 


 











 08/28/18 08/28/18





 06:59 18:59


 


Intake Total 1170 120


 


Output Total 1050 350


 


Balance 120 -230














- Medications


Medications: 


 Current Medications





Carvedilol (Coreg)  6.25 mg PO BID [facility]


   Last Admin: 08/28/18 10:26 Dose:  6.25 mg


Enoxaparin Sodium (Lovenox)  80 mg SC Q12 [facility]


   Last Admin: 08/28/18 10:27 Dose:  80 mg


Finasteride (Proscar)  5 mg PO DAILY [facility]


   Last Admin: 08/28/18 10:26 Dose:  5 mg


Furosemide (Lasix)  40 mg PO DAILY [facility]


   Last Admin: 08/28/18 10:26 Dose:  40 mg


Meropenem 1 gm/ Sodium (Chloride)  100 mls @ 100 mls/hr IVPB Q8H [facility]


   PRN Reason: Protocol


   Last Admin: 08/28/18 10:26 Dose:  100 mls/hr


Ferric Sodium Gluconate Complex 125 mg/ Sodium Chloride  110 mls @ 100 mls/hr 

IVPB DAILY [facility]


   Stop: 09/04/18 18:01


   Last Admin: 08/28/18 10:26 Dose:  100 mls/hr


Losartan Potassium (Cozaar)  100 mg PO DAILY [facility]


   Last Admin: 08/28/18 10:26 Dose:  100 mg


Methylprednisolone (Solu-Medrol)  125 mg IV ONCE PRN


   PRN Reason: ALLERGY TO MEROPENEM


Rosuvastatin Calcium (Crestor)  10 mg PO HS [facility]


   Last Admin: 08/27/18 21:23 Dose:  10 mg


Tamsulosin HCl (Flomax)  0.4 mg PO DAILY [facility]


   Last Admin: 08/28/18 10:26 Dose:  0.4 mg











- Labs


Labs: 


 





 08/28/18 08:16 





 08/28/18 08:16 











- Constitutional


Appears: Well, Non-toxic, No Acute Distress





- Head Exam


Head Exam: ATRAUMATIC, NORMAL INSPECTION, NORMOCEPHALIC





- Eye Exam


Eye Exam: EOMI, Normal appearance


Pupil Exam: PERRL





- ENT Exam


ENT Exam: Mucous Membranes Moist, Normal External Ear Exam





- Neck Exam


Neck Exam: Full ROM





- Respiratory Exam


Respiratory Exam: Clear to Ausculation Bilateral, NORMAL BREATHING PATTERN





- Cardiovascular Exam


Cardiovascular Exam: REGULAR RHYTHM, +S1, +S2





- GI/Abdominal Exam


GI & Abdominal Exam: Soft, Normal Bowel Sounds





- Rectal Exam


Rectal Exam: Deferred





-  Exam


External exam: NORMAL EXTERNAL EXAM





- Extremities Exam


Extremities Exam: Full ROM





- Back Exam


Back Exam: Full ROM, NORMAL INSPECTION





- Neurological Exam


Neurological Exam: Alert, Awake, Oriented x3


Neuro motor strength exam: Left Upper Extremity: 5, Right Upper Extremity: 5, 

Left Lower Extremity: 5, Right Lower Extremity: 5





- Psychiatric Exam


Psychiatric exam: Normal Affect, Normal Mood





- Skin


Skin Exam: Normal Color, Warm





Assessment and Plan


(1) Hematuria


Status: Acute   





(2) UTI (urinary tract infection)


Status: Acute   





(3) Anemia


Status: Chronic   





(4) CAD (coronary artery disease)


Status: Chronic   





(5) Coronary artery disease


Status: Chronic   





(6) Hypertension


Status: Chronic   





(7) Congestive heart failure (CHF)


Status: Chronic   





(8) Pre-diabetes


Status: Chronic   





- Assessment and Plan (Free Text)


Assessment: 





                                       Assessment:











                CAD-CHF





               Left nephrolitiasis.





               BPH





                Anemia secondary to blood loss.





              Prediabetes





               Hyperlipidemia.





              Acute UTI


Plan: 





                                Plan:








        Continue IV Antibiotic-Merem.





       Continue LOvenox.

## 2018-08-29 LAB
BACTERIA #/AREA URNS HPF: (no result) /[HPF]
BILIRUB UR-MCNC: NEGATIVE MG/DL
GLUCOSE UR STRIP-MCNC: (no result) MG/DL
LEUKOCYTE ESTERASE UR-ACNC: (no result) LEU/UL
PH UR STRIP: 6 [PH] (ref 5–8)
PROT UR STRIP-MCNC: (no result) MG/DL
RBC # UR STRIP: (no result) /UL
SP GR UR STRIP: 1 (ref 1–1.03)
UROBILINOGEN UR-MCNC: NORMAL MG/DL (ref 0.2–1)

## 2018-08-29 RX ADMIN — ENOXAPARIN SODIUM SCH: 80 INJECTION SUBCUTANEOUS at 10:34

## 2018-08-29 RX ADMIN — ENOXAPARIN SODIUM SCH: 80 INJECTION SUBCUTANEOUS at 21:12

## 2018-08-29 RX ADMIN — SODIUM FERRIC GLUCONATE COMPLEX SCH MLS/HR: 12.5 INJECTION INTRAVENOUS at 10:58

## 2018-08-29 NOTE — CP.PCM.PN
Subjective





- Date & Time of Evaluation


Date of Evaluation: 08/29/18


Time of Evaluation: 18:13





- Subjective


Subjective: 





                                     Patient see an nd examined. Afebrile. BP 

104/60. Urine is still blood tinged.  On 4th day of IV antibiotics. Dr. Oakes'

s





             notes noted. 





Objective





- Vital Signs/Intake and Output


Vital Signs (last 24 hours): 


 











Temp Pulse Resp BP Pulse Ox


 


 97.4 F L  86   20   104/64   96 


 


 08/29/18 16:00  08/29/18 16:00  08/29/18 16:00  08/29/18 16:00  08/29/18 16:00








Intake and Output: 


 











 08/29/18 08/29/18





 06:59 18:59


 


Intake Total 2100 1040


 


Output Total 1650 1475


 


Balance 450 -435














- Medications


Medications: 


 Current Medications





Carvedilol (Coreg)  6.25 mg PO BID [facility]


   Last Admin: 08/29/18 10:18 Dose:  6.25 mg


Enoxaparin Sodium (Lovenox)  80 mg SC Q12 [facility]


   Last Admin: 08/29/18 10:34 Dose:  Not Given


Finasteride (Proscar)  5 mg PO DAILY [facility]


   Last Admin: 08/29/18 09:16 Dose:  5 mg


Furosemide (Lasix)  40 mg PO DAILY [facility]


   Last Admin: 08/29/18 10:18 Dose:  40 mg


Meropenem 1 gm/ Sodium (Chloride)  100 mls @ 100 mls/hr IVPB Q8H [facility]


   PRN Reason: Protocol


   Last Admin: 08/29/18 11:25 Dose:  100 mls/hr


Ferric Sodium Gluconate Complex 125 mg/ Sodium Chloride  110 mls @ 100 mls/hr 

IVPB DAILY [facility]


   Stop: 09/04/18 18:01


   Last Admin: 08/29/18 10:58 Dose:  100 mls/hr


Losartan Potassium (Cozaar)  100 mg PO DAILY [facility]


   Last Admin: 08/29/18 10:18 Dose:  100 mg


Methylprednisolone (Solu-Medrol)  125 mg IV ONCE PRN


   PRN Reason: ALLERGY TO MEROPENEM


Rosuvastatin Calcium (Crestor)  10 mg PO HS [facility]


   Last Admin: 08/28/18 21:44 Dose:  10 mg


Tamsulosin HCl (Flomax)  0.4 mg PO DAILY [facility]


   Last Admin: 08/29/18 09:16 Dose:  0.4 mg











- Labs


Labs: 


 





 08/28/18 08:16 





 08/28/18 08:16 











- Constitutional


Appears: Well, Non-toxic, No Acute Distress





- Head Exam


Head Exam: ATRAUMATIC, NORMAL INSPECTION, NORMOCEPHALIC





- Eye Exam


Eye Exam: EOMI, PERRL


Pupil Exam: PERRL





- ENT Exam


ENT Exam: Mucous Membranes Moist, Normal Exam





- Neck Exam


Neck Exam: Full ROM, Normal Inspection





- Respiratory Exam


Respiratory Exam: Clear to Ausculation Bilateral, NORMAL BREATHING PATTERN





- Cardiovascular Exam


Cardiovascular Exam: REGULAR RHYTHM, +S1, +S2





- GI/Abdominal Exam


GI & Abdominal Exam: Soft, Normal Bowel Sounds





- Rectal Exam


Rectal Exam: Deferred





- Extremities Exam


Extremities Exam: Full ROM, Normal Inspection





- Back Exam


Back Exam: Full ROM, NORMAL INSPECTION





Assessment and Plan


(1) Hematuria


Status: Acute   





(2) UTI (urinary tract infection)


Status: Acute   





(3) Anemia


Status: Chronic   





(4) CAD (coronary artery disease)


Status: Chronic   





(5) Coronary artery disease


Status: Chronic   





(6) Hypertension


Status: Chronic   





(7) Congestive heart failure (CHF)


Status: Chronic   





(8) Pre-diabetes


Status: Chronic   





- Assessment and Plan (Free Text)


Assessment: 





                                   Assessment:





           CAD with CHF.





         Nephrolithiasis.





          Anemia secondary to blood loss.








          NIDDM





          S/P lithotripsy and cystocopy.





       


Plan: 





                             Plan:








          Continue IV antibiotics.

## 2018-08-29 NOTE — CP.PCM.PN
Subjective





- Date & Time of Evaluation


Date of Evaluation: 08/29/18


Time of Evaluation: 15:00





- Subjective


Subjective: 





dictated





Objective





- Vital Signs/Intake and Output


Vital Signs (last 24 hours): 


 











Temp Pulse Resp BP Pulse Ox


 


 98.3 F   87   20   117/56 L  100 


 


 08/29/18 08:00  08/29/18 09:16  08/29/18 08:00  08/29/18 10:18  08/29/18 08:00








Intake and Output: 


 











 08/29/18 08/29/18





 06:59 18:59


 


Intake Total 2100 


 


Output Total 1650 1475


 


Balance 450 -1475














- Medications


Medications: 


 Current Medications





Carvedilol (Coreg)  6.25 mg PO BID [facility]


   Last Admin: 08/29/18 10:18 Dose:  6.25 mg


Enoxaparin Sodium (Lovenox)  80 mg SC Q12 [facility]


   Last Admin: 08/29/18 10:34 Dose:  Not Given


Finasteride (Proscar)  5 mg PO DAILY [facility]


   Last Admin: 08/29/18 09:16 Dose:  5 mg


Furosemide (Lasix)  40 mg PO DAILY [facility]


   Last Admin: 08/29/18 10:18 Dose:  40 mg


Meropenem 1 gm/ Sodium (Chloride)  100 mls @ 100 mls/hr IVPB Q8H [facility]


   PRN Reason: Protocol


   Last Admin: 08/29/18 11:25 Dose:  100 mls/hr


Ferric Sodium Gluconate Complex 125 mg/ Sodium Chloride  110 mls @ 100 mls/hr 

IVPB DAILY [facility]


   Stop: 09/04/18 18:01


   Last Admin: 08/29/18 10:58 Dose:  100 mls/hr


Losartan Potassium (Cozaar)  100 mg PO DAILY [facility]


   Last Admin: 08/29/18 10:18 Dose:  100 mg


Methylprednisolone (Solu-Medrol)  125 mg IV ONCE PRN


   PRN Reason: ALLERGY TO MEROPENEM


Rosuvastatin Calcium (Crestor)  10 mg PO HS [facility]


   Last Admin: 08/28/18 21:44 Dose:  10 mg


Tamsulosin HCl (Flomax)  0.4 mg PO DAILY [facility]


   Last Admin: 08/29/18 09:16 Dose:  0.4 mg











- Labs


Labs: 


 





 08/28/18 08:16 





 08/28/18 08:16

## 2018-08-29 NOTE — PCM.URO
Urology Progress Note





- General


General: Tolerating Diet





- Subjective


Abdominal Pain: No


Flank Pain: No


Nausea: No


Vomiting: No


Voiding Well: Yes (good stream)


Hematuria: Yes


Frequency: Yes


Dsypnea: No


Chest Pain: No


Fever & Chills: No





- Objective


Intake & Output: 


 Intake & Output











 08/28/18 08/29/18 08/29/18





 18:59 06:59 18:59


 


Intake Total 120 2100 


 


Output Total 350 1650 


 


Balance -230 450 


 


Intake:   


 


  Intake, IV Amount  100 


 


    Left Antecubital  100 


 


  Oral 120 2000 


 


Output:   


 


  Urine 350 1650 


 


    Urethral ([place]) 350 550 


 


    Urine, Voided  1100 


 


Other:   


 


  # Voids   


 


    Urine, Voided  1 


 


  # Bowel Movements  1 











Vital Signs: 


 Vital Signs - 24 hr











  08/28/18 08/28/18 08/28/18





  12:18 15:30 23:58


 


Temperature  97.5 F L 98.2 F


 


Pulse Rate 86 90 78


 


Respiratory  18 18





Rate   


 


Blood Pressure 114/66 104/63 117/78


 


O2 Sat by Pulse  99 98





Oximetry   














  08/29/18 08/29/18 08/29/18





  08:00 09:16 10:18


 


Temperature 98.3 F  


 


Pulse Rate 82 87 


 


Respiratory 20  





Rate   


 


Blood Pressure 129/73 117/56 L 117/56 L


 


O2 Sat by Pulse 100  





Oximetry   














- Physical Exam


Abdominal Exam: Soft, Non-Tender, Non-Distended


Back: No CVA Tenderness


Genitalia: Without Inflammation


Urine Color: Dark Red





- Plan


Intake & Output: Yes


Additional Information: IMP:  stable post ureteroscopy, laser lithotripsy.  

Residual lower pole stone.  Urine was clear via [place] yesterday.  Now, after 

catheter removal, and after Lovenox, hematuria has recurred.  Rec: hydration, 

hold lovenox.  Pt will require repeat lithotripsy





- Date & Time of Note


Date: 08/29/18


Time: 10:48

## 2018-08-29 NOTE — CP.PCM.PN
Subjective





- Date & Time of Evaluation


Date of Evaluation: 08/29/18


Time of Evaluation: 19:45





- Subjective


Subjective: 





Patient still has hematuria. Lovenox on hold. Patient has no complaint of SOB, 

chest pain, indigestion.





Objective





- Vital Signs/Intake and Output


Vital Signs (last 24 hours): 


 











Temp Pulse Resp BP Pulse Ox


 


 97.4 F L  86   20   104/64   96 


 


 08/29/18 16:00  08/29/18 16:00  08/29/18 16:00  08/29/18 16:00  08/29/18 16:00








Intake and Output: 


 











 08/29/18 08/30/18





 18:59 06:59


 


Intake Total 1040 3600


 


Output Total 1475 


 


Balance -435 3600














- Medications


Medications: 


 Current Medications





Carvedilol (Coreg)  6.25 mg PO BID [facility]


   Last Admin: 08/29/18 18:43 Dose:  6.25 mg


Enoxaparin Sodium (Lovenox)  80 mg SC Q12 [facility]


   Last Admin: 08/29/18 21:12 Dose:  Not Given


Finasteride (Proscar)  5 mg PO DAILY [facility]


   Last Admin: 08/29/18 09:16 Dose:  5 mg


Furosemide (Lasix)  40 mg PO DAILY [facility]


   Last Admin: 08/29/18 10:18 Dose:  40 mg


Meropenem 1 gm/ Sodium (Chloride)  100 mls @ 100 mls/hr IVPB Q8H [facility]


   PRN Reason: Protocol


   Last Admin: 08/29/18 19:40 Dose:  100 mls/hr


Ferric Sodium Gluconate Complex 125 mg/ Sodium Chloride  110 mls @ 100 mls/hr 

IVPB DAILY [facility]


   Stop: 09/04/18 18:01


   Last Admin: 08/29/18 10:58 Dose:  100 mls/hr


Losartan Potassium (Cozaar)  100 mg PO DAILY [facility]


   Last Admin: 08/29/18 10:18 Dose:  100 mg


Methylprednisolone (Solu-Medrol)  125 mg IV ONCE [facility]


   PRN Reason: ALLERGY TO MEROPENEM


Rosuvastatin Calcium (Crestor)  10 mg PO HS [facility]


   Last Admin: 08/29/18 21:20 Dose:  10 mg


Tamsulosin HCl (Flomax)  0.4 mg PO DAILY [facility]


   Last Admin: 08/29/18 09:16 Dose:  0.4 mg











- Labs


Labs: 


 





 08/28/18 08:16 





 08/28/18 08:16 











- Constitutional


Appears: Well, No Acute Distress





- Head Exam


Head Exam: NORMAL INSPECTION





- Eye Exam


Eye Exam: Normal appearance





- ENT Exam


ENT Exam: Normal Exam





- Neck Exam


Neck Exam: Normal Inspection





- Respiratory Exam


Respiratory Exam: Clear to Ausculation Bilateral





- Cardiovascular Exam


Cardiovascular Exam: REGULAR RHYTHM, Murmur





- GI/Abdominal Exam


GI & Abdominal Exam: Soft





- Rectal Exam


Rectal Exam: Deferred





-  Exam


 Exam: NORMAL INSPECTION





- Extremities Exam


Extremities Exam: Normal Inspection





- Back Exam


Back Exam: NORMAL INSPECTION





- Neurological Exam


Neurological Exam: Alert, Awake, Oriented x3





- Psychiatric Exam


Psychiatric exam: Anxious





- Skin


Skin Exam: Dry, Intact, Normal Color





Assessment and Plan


(1) Hematuria


Assessment & Plan: 


As per Dr Oakes


Status: Acute   





(2) UTI (urinary tract infection)


Status: Acute   





(3) Anemia associated with acute blood loss


Status: Acute

## 2018-08-30 LAB
BACTERIA #/AREA URNS HPF: (no result) /[HPF]
BASOPHILS # BLD AUTO: 0 K/UL (ref 0–0.2)
BASOPHILS NFR BLD: 0.7 % (ref 0–2)
BILIRUB UR-MCNC: NEGATIVE MG/DL
EOSINOPHIL # BLD AUTO: 0.3 K/UL (ref 0–0.7)
EOSINOPHIL NFR BLD: 5.2 % (ref 0–4)
ERYTHROCYTE [DISTWIDTH] IN BLOOD BY AUTOMATED COUNT: 19.9 % (ref 11.5–14.5)
GLUCOSE UR STRIP-MCNC: NORMAL MG/DL
HGB BLD-MCNC: 7.9 G/DL (ref 12–18)
LEUKOCYTE ESTERASE UR-ACNC: (no result) LEU/UL
LYMPHOCYTES # BLD AUTO: 1.2 K/UL (ref 1–4.3)
LYMPHOCYTES NFR BLD AUTO: 23.3 % (ref 20–40)
MCH RBC QN AUTO: 26 PG (ref 27–31)
MCHC RBC AUTO-ENTMCNC: 33 G/DL (ref 33–37)
MCV RBC AUTO: 78.6 FL (ref 80–94)
MONOCYTES # BLD: 0.6 K/UL (ref 0–0.8)
MONOCYTES NFR BLD: 12.5 % (ref 0–10)
NEUTROPHILS # BLD: 2.9 K/UL (ref 1.8–7)
NEUTROPHILS NFR BLD AUTO: 58.3 % (ref 50–75)
NRBC BLD AUTO-RTO: 0 % (ref 0–2)
PH UR STRIP: 7 [PH] (ref 5–8)
PLATELET # BLD: 245 K/UL (ref 130–400)
PMV BLD AUTO: 8.9 FL (ref 7.2–11.7)
PROT UR STRIP-MCNC: NEGATIVE MG/DL
RBC # BLD AUTO: 3.06 MIL/UL (ref 4.4–5.9)
RBC # UR STRIP: (no result) /UL
SP GR UR STRIP: 1 (ref 1–1.03)
SQUAMOUS EPITHIAL: < 1 /HPF (ref 0–5)
UROBILINOGEN UR-MCNC: NORMAL MG/DL (ref 0.2–1)
WBC # BLD AUTO: 5 K/UL (ref 4.8–10.8)

## 2018-08-30 RX ADMIN — SODIUM FERRIC GLUCONATE COMPLEX SCH MLS/HR: 12.5 INJECTION INTRAVENOUS at 10:45

## 2018-08-30 NOTE — CP.PCM.PN
Subjective





- Date & Time of Evaluation


Date of Evaluation: 08/30/18


Time of Evaluation: 10:24





- Subjective


Subjective: 





                                            Patient seen and examined. Still 

with active hematuria. Hgb. dropped to 7.9 this AM Will ransfuse with 2 units





             of PRBC today. Lovenox on hold. as per Dr. Spivey.





Objective





- Vital Signs/Intake and Output


Vital Signs (last 24 hours): 


 











Temp Pulse Resp BP Pulse Ox


 


 97.9 F   70   20   101/56 L  98 


 


 08/30/18 07:00  08/30/18 07:00  08/30/18 07:00  08/30/18 09:30  08/30/18 07:00








Intake and Output: 


 











 08/30/18 08/30/18





 06:59 18:59


 


Intake Total 3600 


 


Output Total 600 


 


Balance 3000 














- Medications


Medications: 


 Current Medications





Carvedilol (Coreg)  6.25 mg PO BID [facility]


   Last Admin: 08/30/18 09:30 Dose:  6.25 mg


Enoxaparin Sodium (Lovenox)  80 mg SC Q12 [facility]


   Last Admin: 08/29/18 21:12 Dose:  Not Given


Finasteride (Proscar)  5 mg PO DAILY [facility]


   Last Admin: 08/30/18 09:30 Dose:  5 mg


Furosemide (Lasix)  40 mg PO DAILY [facility]


   Last Admin: 08/30/18 09:30 Dose:  40 mg


Meropenem 1 gm/ Sodium (Chloride)  100 mls @ 100 mls/hr IVPB Q8H [facility]


   PRN Reason: Protocol


   Last Admin: 08/30/18 03:57 Dose:  100 mls/hr


Ferric Sodium Gluconate Complex 125 mg/ Sodium Chloride  110 mls @ 100 mls/hr 

IVPB DAILY [facility]


   Stop: 09/04/18 18:01


   Last Admin: 08/29/18 10:58 Dose:  100 mls/hr


Losartan Potassium (Cozaar)  100 mg PO DAILY [facility]


   Last Admin: 08/30/18 09:30 Dose:  100 mg


Methylprednisolone (Solu-Medrol)  125 mg IV ONCE PRN


   PRN Reason: ALLERGY TO MEROPENEM


Rosuvastatin Calcium (Crestor)  10 mg PO HS [facility]


   Last Admin: 08/29/18 21:20 Dose:  10 mg


Tamsulosin HCl (Flomax)  0.4 mg PO DAILY [facility]


   Last Admin: 08/30/18 09:30 Dose:  0.4 mg











- Labs


Labs: 


 





 08/30/18 07:10 





 08/28/18 08:16 











- Constitutional


Appears: Well, Non-toxic, No Acute Distress





- Head Exam


Head Exam: ATRAUMATIC, NORMAL INSPECTION, NORMOCEPHALIC





- Eye Exam


Eye Exam: Normal appearance


Pupil Exam: PERRL





- ENT Exam


ENT Exam: Mucous Membranes Moist, Normal Exam





- Neck Exam


Neck Exam: Full ROM





- Respiratory Exam


Respiratory Exam: Clear to Ausculation Bilateral, NORMAL BREATHING PATTERN





- Cardiovascular Exam


Cardiovascular Exam: REGULAR RHYTHM, +S1, +S2





- GI/Abdominal Exam


GI & Abdominal Exam: Soft, Normal Bowel Sounds





- Rectal Exam


Rectal Exam: Deferred





- Extremities Exam


Extremities Exam: Full ROM, Normal Capillary Refill, Normal Inspection





- Back Exam


Back Exam: Full ROM, NORMAL INSPECTION





- Neurological Exam


Neurological Exam: Alert, Awake, Normal Gait, Oriented x3





- Psychiatric Exam


Psychiatric exam: Normal Affect, Normal Mood





- Skin


Skin Exam: Dry, Intact, Normal Color, Warm





Assessment and Plan


(1) Hematuria


Status: Acute   





(2) UTI (urinary tract infection)


Status: Acute   





(3) Anemia


Status: Chronic   





(4) CAD (coronary artery disease)


Status: Chronic   





(5) Coronary artery disease


Status: Chronic   





(6) Hypertension


Status: Chronic   





(7) Congestive heart failure (CHF)


Status: Chronic   





(8) Pre-diabetes


Status: Chronic   





(9) Genitourinary bleeding


Status: Acute   





- Assessment and Plan (Free Text)


Plan: 





                                        Plan:











           Continue IV antibiotics.





          Check urine culture.





          Blood transfusion today.

## 2018-08-30 NOTE — CP.PCM.PN
Subjective





- Date & Time of Evaluation


Date of Evaluation: 08/30/18


Time of Evaluation: 19:46





- Subjective


Subjective: 





Patient no abdominal pain, no fever. His urine has been clear the whole day 

today. Will try to reinstitute Lovenox s/c tonight.





Objective





- Vital Signs/Intake and Output


Vital Signs (last 24 hours): 


 











Temp Pulse Resp BP Pulse Ox


 


 97.8 F   78   18   115/70   97 


 


 08/30/18 18:05  08/30/18 18:05  08/30/18 18:05  08/30/18 18:05  08/30/18 15:54








Intake and Output: 


 











 08/30/18 08/31/18





 18:59 06:59


 


Intake Total 200 


 


Output Total 1210 


 


Balance -1010 














- Medications


Medications: 


 Current Medications





Carvedilol (Coreg)  6.25 mg PO BID [facility]


   Last Admin: 08/30/18 17:39 Dose:  6.25 mg


Enoxaparin Sodium (Lovenox)  80 mg SC Q12 [facility]


   Last Admin: 08/29/18 21:12 Dose:  Not Given


Enoxaparin Sodium (Lovenox)  80 mg SC [facility]


   Stop: 08/30/18 19:46


Finasteride (Proscar)  5 mg PO DAILY [facility]


   Last Admin: 08/30/18 09:30 Dose:  5 mg


Furosemide (Lasix)  40 mg PO DAILY [facility]


   Last Admin: 08/30/18 09:30 Dose:  40 mg


Meropenem 1 gm/ Sodium (Chloride)  100 mls @ 100 mls/hr IVPB Q8H [facility]


   PRN Reason: Protocol


   Last Admin: 08/30/18 11:49 Dose:  100 mls/hr


Ferric Sodium Gluconate Complex 125 mg/ Sodium Chloride  110 mls @ 100 mls/hr 

IVPB DAILY [facility]


   Stop: 09/04/18 18:01


   Last Admin: 08/30/18 10:45 Dose:  100 mls/hr


Losartan Potassium (Cozaar)  100 mg PO DAILY [facility]


   Last Admin: 08/30/18 09:30 Dose:  100 mg


Methylprednisolone (Solu-Medrol)  125 mg IV ONCE PRN


   PRN Reason: ALLERGY TO MEROPENEM


Rosuvastatin Calcium (Crestor)  10 mg PO HS [facility]


   Last Admin: 08/29/18 21:20 Dose:  10 mg


Tamsulosin HCl (Flomax)  0.4 mg PO DAILY [facility]


   Last Admin: 08/30/18 09:30 Dose:  0.4 mg











- Labs


Labs: 


 





 08/30/18 07:10 





 08/28/18 08:16 











- Constitutional


Appears: Well, No Acute Distress





- Head Exam


Head Exam: NORMAL INSPECTION





- Eye Exam


Eye Exam: Normal appearance





- ENT Exam


ENT Exam: Normal Exam





- Neck Exam


Neck Exam: Normal Inspection





- Respiratory Exam


Respiratory Exam: Clear to Ausculation Bilateral, NORMAL BREATHING PATTERN





- Cardiovascular Exam


Cardiovascular Exam: REGULAR RHYTHM





- GI/Abdominal Exam


GI & Abdominal Exam: Soft, Normal Bowel Sounds





- Rectal Exam


Rectal Exam: Deferred





- Extremities Exam


Extremities Exam: Normal Inspection





- Back Exam


Back Exam: CVA tenderness (L)





- Neurological Exam


Neurological Exam: Alert, Awake, Oriented x3





- Psychiatric Exam


Psychiatric exam: Anxious





- Skin


Skin Exam: Dry, Normal Color, Warm





Assessment and Plan


(1) Hematuria


Status: Acute   





(2) UTI (urinary tract infection)


Status: Acute   





(3) Anemia associated with acute blood loss


Status: Acute

## 2018-08-30 NOTE — PN
Copied To:  Artur Lomas MD

Attending MD:  Artur Lomas MD



DATE:  08/30/2018



SUBJECTIVE:  The patient was seen today.  He is on Lasix and he is having

urgency, but he did have clear urine in the urinal today.  He did drop his

hemoglobin and has been having active hematuria.  Also yesterday, he

dropped his hemoglobin to 7.9 and is due to have some blood today.  He

remains on the Merrem 1 g every 8 hours at this time and he tells me that

there are still stones and Dr. Oakes was telling him about the stone

center, so that may be needed, but at this time, he is going to receive

blood.



PHYSICAL EXAMINATION:

VITAL SIGNS:   T-max is 97.9, pulse 70, blood pressure is 101/56,

respirations are 20.

HEENT:  Head is atraumatic, normocephalic.

NECK:  Supple.

LUNGS:  Clear.  No crackles or rales present at this time.

HEART:  S1 and S2 is regular.  Has a murmur.

ABDOMEN:  Soft, nontender.  No guarding.  No rigidity present.

EXTREMITIES:  Ankles have no edema.



LABORATORY DATA:  Labs are noted.  Labs show white count is 5, hemoglobin

7.9, hematocrit 24, platelet count is 245.  Sodium is 139, potassium is

3.8, chloride is 104, CO2 is 29, BUN is 13, creatinine is 1.1.



ASSESSMENT AND PLAN:  The patient has Proteus mirabilis, which was

multidrug resistant and is sensitive to only Ertapenem, meropenem and to

Zosyn.  The patient has been on meropenem.  HE IS ALLERGIC TO PENICILLIN. 

He is on it for last five days and plan to give it for next five days.  I

told him to plan stone center on that time, but if it is not, may have to

prolong the therapy and I would repeat a urinalysis and urine culture and

sensitivity today just to be sure what is happening with his urine.  The

patient had a complicated urinary tract infection with hydronephrosis,

stones, and also he is anemic due to hematuria.  We will follow and to

continue meropenem and if he goes _____.







__________________________________________

Artur Lomas MD



DD:  08/30/2018 12:07:32

DT:  08/30/2018 12:50:45

Job # [id number]

## 2018-08-30 NOTE — CP.PCM.PN
Subjective





- Date & Time of Evaluation


Date of Evaluation: 08/30/18


Time of Evaluation: 12:00





Objective





- Vital Signs/Intake and Output


Vital Signs (last 24 hours): 


 











Temp Pulse Resp BP Pulse Ox


 


 97.9 F   70   20   101/56 L  98 


 


 08/30/18 07:00  08/30/18 07:00  08/30/18 07:00  08/30/18 09:30  08/30/18 07:00








Intake and Output: 


 











 08/30/18 08/30/18





 06:59 18:59


 


Intake Total 3600 


 


Output Total 600 


 


Balance 3000 














- Medications


Medications: 


 Current Medications





Carvedilol (Coreg)  6.25 mg PO BID [facility]


   Last Admin: 08/30/18 09:30 Dose:  6.25 mg


Enoxaparin Sodium (Lovenox)  80 mg SC Q12 [facility]


   Last Admin: 08/29/18 21:12 Dose:  Not Given


Finasteride (Proscar)  5 mg PO DAILY [facility]


   Last Admin: 08/30/18 09:30 Dose:  5 mg


Furosemide (Lasix)  40 mg PO DAILY [facility]


   Last Admin: 08/30/18 09:30 Dose:  40 mg


Meropenem 1 gm/ Sodium (Chloride)  100 mls @ 100 mls/hr IVPB Q8H [facility]


   PRN Reason: Protocol


   Last Admin: 08/30/18 03:57 Dose:  100 mls/hr


Ferric Sodium Gluconate Complex 125 mg/ Sodium Chloride  110 mls @ 100 mls/hr 

IVPB DAILY [facility]


   Stop: 09/04/18 18:01


   Last Admin: 08/30/18 10:45 Dose:  100 mls/hr


Losartan Potassium (Cozaar)  100 mg PO DAILY [facility]


   Last Admin: 08/30/18 09:30 Dose:  100 mg


Methylprednisolone (Solu-Medrol)  125 mg IV ONCE PRN


   PRN Reason: ALLERGY TO MEROPENEM


Rosuvastatin Calcium (Crestor)  10 mg PO HS [facility]


   Last Admin: 08/29/18 21:20 Dose:  10 mg


Tamsulosin HCl (Flomax)  0.4 mg PO DAILY [facility]


   Last Admin: 08/30/18 09:30 Dose:  0.4 mg











- Labs


Labs: 


 





 08/30/18 07:10 





 08/28/18 08:16

## 2018-08-31 LAB
ALBUMIN SERPL-MCNC: 3.1 G/DL (ref 3.5–5)
ALBUMIN/GLOB SERPL: 1.1 {RATIO} (ref 1–2.1)
ALT SERPL-CCNC: 20 U/L (ref 21–72)
AST SERPL-CCNC: 24 U/L (ref 17–59)
BACTERIA #/AREA URNS HPF: (no result) /[HPF]
BASOPHILS # BLD AUTO: 0 K/UL (ref 0–0.2)
BASOPHILS NFR BLD: 0.4 % (ref 0–2)
BILIRUB UR-MCNC: NEGATIVE MG/DL
BUN SERPL-MCNC: 17 MG/DL (ref 9–20)
CALCIUM SERPL-MCNC: 8.4 MG/DL (ref 8.6–10.4)
EOSINOPHIL # BLD AUTO: 0.2 K/UL (ref 0–0.7)
EOSINOPHIL NFR BLD: 3.5 % (ref 0–4)
ERYTHROCYTE [DISTWIDTH] IN BLOOD BY AUTOMATED COUNT: 18.8 % (ref 11.5–14.5)
GFR NON-AFRICAN AMERICAN: > 60
GLUCOSE UR STRIP-MCNC: NORMAL MG/DL
HGB BLD-MCNC: 10.5 G/DL (ref 12–18)
LEUKOCYTE ESTERASE UR-ACNC: (no result) LEU/UL
LYMPHOCYTES # BLD AUTO: 1.5 K/UL (ref 1–4.3)
LYMPHOCYTES NFR BLD AUTO: 23.6 % (ref 20–40)
MCH RBC QN AUTO: 27.4 PG (ref 27–31)
MCHC RBC AUTO-ENTMCNC: 34.2 G/DL (ref 33–37)
MCV RBC AUTO: 80 FL (ref 80–94)
MONOCYTES # BLD: 0.7 K/UL (ref 0–0.8)
MONOCYTES NFR BLD: 10.8 % (ref 0–10)
NEUTROPHILS # BLD: 4 K/UL (ref 1.8–7)
NEUTROPHILS NFR BLD AUTO: 61.7 % (ref 50–75)
NRBC BLD AUTO-RTO: 0.1 % (ref 0–2)
PH UR STRIP: 7 [PH] (ref 5–8)
PLATELET # BLD: 259 K/UL (ref 130–400)
PMV BLD AUTO: 9.1 FL (ref 7.2–11.7)
PROT UR STRIP-MCNC: (no result) MG/DL
RBC # BLD AUTO: 3.83 MIL/UL (ref 4.4–5.9)
RBC # UR STRIP: (no result) /UL
SP GR UR STRIP: 1.01 (ref 1–1.03)
SQUAMOUS EPITHIAL: 2 /HPF (ref 0–5)
UROBILINOGEN UR-MCNC: NORMAL MG/DL (ref 0.2–1)
WBC # BLD AUTO: 6.5 K/UL (ref 4.8–10.8)

## 2018-08-31 RX ADMIN — SODIUM FERRIC GLUCONATE COMPLEX SCH MLS/HR: 12.5 INJECTION INTRAVENOUS at 10:52

## 2018-08-31 NOTE — PCM.URO
Urology Progress Note





- General


General: No Complaints, Tolerating Diet





- Subjective


Abdominal Pain: No


Flank Pain: No


Nausea: No


Vomiting: No


Voiding Well: Yes


Dysuria: No


Hematuria: Yes (Hematuria started last night, after lovenox was resumed)


[place]: Yes


Stone Passed: Yes


Dsypnea: No


Chest Pain: No


Fever & Chills: No





- Objective


Lab Studies: Reviewed


Lab Results Last 24 Hours: 


 Laboratory Results - last 24 hr











  08/30/18 08/31/18 08/31/18





  11:49 07:18 07:18


 


WBC   6.5 


 


RBC   3.83 L 


 


Hgb   10.5 L D 


 


Hct   30.6 L 


 


MCV   80.0 


 


MCH   27.4 


 


MCHC   34.2 


 


RDW   18.8 H 


 


Plt Count   259 


 


MPV   9.1 


 


Neut % (Auto)   61.7 


 


Lymph % (Auto)   23.6 


 


Mono % (Auto)   10.8 H 


 


Eos % (Auto)   3.5 


 


Baso % (Auto)   0.4 


 


Neut # (Auto)   4.0 


 


Lymph # (Auto)   1.5 


 


Mono # (Auto)   0.7 


 


Eos # (Auto)   0.2 


 


Baso # (Auto)   0.0 


 


Sodium    138


 


Potassium    3.6


 


Chloride    101


 


Carbon Dioxide    29


 


Anion Gap    12


 


BUN    17


 


Creatinine    1.0


 


Est GFR ( Amer)    > 60


 


Est GFR (Non-Af Amer)    > 60


 


Random Glucose    89


 


Calcium    8.4 L


 


Magnesium    1.9


 


Total Bilirubin    0.6


 


AST    24


 


ALT    20 L


 


Alkaline Phosphatase    50


 


Total Protein    6.1 L


 


Albumin    3.1 L


 


Globulin    2.9


 


Albumin/Globulin Ratio    1.1


 


Blood Type  A POSITIVE  


 


Antibody Screen  Negative  











Intake & Output: 


 Intake & Output











 08/31/18 08/31/18 09/01/18





 06:59 18:59 06:59


 


Intake Total 1650 210 


 


Output Total 1960 1100 


 


Balance -310 -890 


 


Intake:   


 


  Intake, IV Amount 250 210 


 


    Left Antecubital 250 210 


 


  Blood Product 1300  


 


    Red Blood Cells Cpd As1 325  





    Lr  Unit [id number]   


 


    Red Blood Cells Cpd As1 325  





    Lr  Unit [id number]   


 


  Other 100  


 


    Red Blood Cells Cpd As1 50  





    Lr  Unit [id number]   


 


    Red Blood Cells Cpd As1 50  





    Lr  Unit [id number]   


 


Output:   


 


  Urine 1960 1100 


 


    Urine, Voided 1960 1100 


 


Other:   


 


  # Voids   


 


    Urine, Voided 5  











Vital Signs: 


 Vital Signs - 24 hr











  08/30/18 08/30/18 08/30/18





  22:29 22:44 22:52


 


Temperature 98.2 F 97.8 F 


 


Pulse Rate 80 77 


 


Respiratory 18 20 





Rate   


 


Blood Pressure 113/62 122/62 113/62


 


O2 Sat by Pulse   





Oximetry   














  08/30/18 08/30/18 08/31/18





  23:10 23:41 01:10


 


Temperature 98.1 F 97.9 F 97.9 F


 


Pulse Rate 76 77 77


 


Respiratory 18 18 18





Rate   


 


Blood Pressure 109/59 L 104/59 L 110/58 L


 


O2 Sat by Pulse   





Oximetry   














  08/31/18 08/31/18 08/31/18





  01:35 07:00 10:52


 


Temperature 97.9 F 97.8 F 


 


Pulse Rate 77 72 


 


Respiratory 18 20 





Rate   


 


Blood Pressure 110/58 L 124/69 122/73


 


O2 Sat by Pulse  99 





Oximetry   














  08/31/18





  16:32


 


Temperature 97.7 F


 


Pulse Rate 73


 


Respiratory 20





Rate 


 


Blood Pressure 109/68


 


O2 Sat by Pulse 98





Oximetry 














- Physical Exam


Abdominal Exam: Soft, Non-Tender, Non-Distended


Back: No CVA Tenderness


Genitalia: Without Inflammation


Urine Color: Dark Red





- Plan


Ambulation - Out of Bed: Yes


Intake & Output: Yes


Additional Information: IMP:  hemturia.  poss of prostate and / or renal 

origin.  Hematuria recurrred after resuming Lovenox.  Rec/Plan:  Monitor urine 

output.  Stent in place.  Follow Hct.  Poss further ureteroscopy and laser  

lithotripsy,.  poss ESWL to treat residual LLP stone.  AXR.  culture.  YS





- Date & Time of Note


Date: 08/31/18


Time: 10:25

## 2018-08-31 NOTE — CP.PCM.PN
Subjective





- Date & Time of Evaluation


Date of Evaluation: 08/31/18


Time of Evaluation: 11:00





- Subjective


Subjective: 





dictated





Objective





- Vital Signs/Intake and Output


Vital Signs (last 24 hours): 


 











Temp Pulse Resp BP Pulse Ox


 


 97.7 F   73   20   109/68   98 


 


 08/31/18 16:32  08/31/18 16:32  08/31/18 16:32  08/31/18 16:32  08/31/18 16:32








Intake and Output: 


 











 08/31/18 09/01/18





 18:59 06:59


 


Intake Total 210 


 


Output Total 1100 240


 


Balance -890 -240














- Medications


Medications: 


 Current Medications





Carvedilol (Coreg)  6.25 mg PO BID [facility]


   Last Admin: 08/31/18 18:31 Dose:  6.25 mg


Enoxaparin Sodium (Lovenox)  80 mg SC Q12 [facility]


   Last Admin: 08/29/18 21:12 Dose:  Not Given


Finasteride (Proscar)  5 mg PO DAILY [facility]


   Last Admin: 08/31/18 10:52 Dose:  5 mg


Furosemide (Lasix)  40 mg PO DAILY [facility]


   Last Admin: 08/31/18 10:52 Dose:  40 mg


Meropenem 1 gm/ Sodium (Chloride)  100 mls @ 100 mls/hr IVPB Q8H [facility]


   PRN Reason: Protocol


   Last Admin: 08/31/18 18:31 Dose:  100 mls/hr


Ferric Sodium Gluconate Complex 125 mg/ Sodium Chloride  110 mls @ 100 mls/hr 

IVPB DAILY [facility]


   Stop: 09/04/18 18:01


   Last Admin: 08/31/18 10:52 Dose:  100 mls/hr


Losartan Potassium (Cozaar)  100 mg PO DAILY [facility]


   Last Admin: 08/31/18 10:52 Dose:  100 mg


Methylprednisolone (Solu-Medrol)  125 mg IV ONCE PRN


   PRN Reason: ALLERGY TO MEROPENEM


Rosuvastatin Calcium (Crestor)  10 mg PO HS [facility]


   Last Admin: 08/31/18 21:42 Dose:  10 mg


Tamsulosin HCl (Flomax)  0.4 mg PO DAILY [facility]


   Last Admin: 08/31/18 10:52 Dose:  0.4 mg











- Labs


Labs: 


 





 08/31/18 07:18 





 08/31/18 07:18

## 2018-08-31 NOTE — CP.PCM.PN
Subjective





- Date & Time of Evaluation


Date of Evaluation: 08/31/18


Time of Evaluation: 10:58





- Subjective


Subjective: 





                                          patient seen aand examined. Started 

to heve hematuria again after 1 dose of Lovenox.Received 2 units of PRBC, no





          untoward effects. Hgb now 10.5 afyer 2 units. urinalysis done, 

awaiting C/S. 





Objective





- Vital Signs/Intake and Output


Vital Signs (last 24 hours): 


 











Temp Pulse Resp BP Pulse Ox


 


 97.8 F   72   20   122/73   99 


 


 08/31/18 07:00  08/31/18 07:00  08/31/18 07:00  08/31/18 10:52  08/31/18 07:00








Intake and Output: 


 











 08/31/18 08/31/18





 06:59 18:59


 


Intake Total 1650 


 


Output Total 1960 


 


Balance -310 














- Medications


Medications: 


 Current Medications





Carvedilol (Coreg)  6.25 mg PO BID [facility]


   Last Admin: 08/31/18 10:52 Dose:  6.25 mg


Enoxaparin Sodium (Lovenox)  80 mg SC Q12 [facility]


   Last Admin: 08/29/18 21:12 Dose:  Not Given


Finasteride (Proscar)  5 mg PO DAILY [facility]


   Last Admin: 08/31/18 10:52 Dose:  5 mg


Furosemide (Lasix)  40 mg PO DAILY [facility]


   Last Admin: 08/31/18 10:52 Dose:  40 mg


Meropenem 1 gm/ Sodium (Chloride)  100 mls @ 100 mls/hr IVPB Q8H [facility]


   PRN Reason: Protocol


   Last Admin: 08/31/18 02:35 Dose:  100 mls/hr


Ferric Sodium Gluconate Complex 125 mg/ Sodium Chloride  110 mls @ 100 mls/hr 

IVPB DAILY [facility]


   Stop: 09/04/18 18:01


   Last Admin: 08/31/18 10:52 Dose:  100 mls/hr


Losartan Potassium (Cozaar)  100 mg PO DAILY [facility]


   Last Admin: 08/31/18 10:52 Dose:  100 mg


Methylprednisolone (Solu-Medrol)  125 mg IV ONCE [facility]


   PRN Reason: ALLERGY TO MEROPENEM


Rosuvastatin Calcium (Crestor)  10 mg PO HS [facility]


   Last Admin: 08/30/18 21:03 Dose:  10 mg


Tamsulosin HCl (Flomax)  0.4 mg PO DAILY [facility]


   Last Admin: 08/31/18 10:52 Dose:  0.4 mg











- Labs


Labs: 


 





 08/31/18 07:18 





 08/31/18 07:18 











- Constitutional


Appears: Well, Non-toxic, No Acute Distress





- Head Exam


Head Exam: ATRAUMATIC, NORMAL INSPECTION, NORMOCEPHALIC





- Eye Exam


Eye Exam: EOMI


Pupil Exam: PERRL





- ENT Exam


ENT Exam: Mucous Membranes Moist, Normal Exam





- Neck Exam


Neck Exam: Full ROM





- Respiratory Exam


Respiratory Exam: Clear to Ausculation Bilateral, NORMAL BREATHING PATTERN





- Cardiovascular Exam


Cardiovascular Exam: REGULAR RHYTHM, +S1, +S2





- GI/Abdominal Exam


GI & Abdominal Exam: Soft, Normal Bowel Sounds





- Rectal Exam


Rectal Exam: Deferred





- Back Exam


Back Exam: NORMAL INSPECTION





- Neurological Exam


Neurological Exam: Alert, Awake, Oriented x3





- Psychiatric Exam


Psychiatric exam: Normal Affect, Normal Mood





- Skin


Skin Exam: Dry, Intact, Normal Color





Assessment and Plan


(1) Hematuria


Status: Acute   





(2) UTI (urinary tract infection)


Status: Acute   





(3) Anemia


Status: Chronic   





(4) CAD (coronary artery disease)


Status: Chronic   





(5) Coronary artery disease


Status: Chronic   





(6) Hypertension


Status: Chronic   





(7) Congestive heart failure (CHF)


Status: Chronic   





(8) Pre-diabetes


Status: Chronic   





(9) Genitourinary bleeding


Status: Acute   





- Assessment and Plan (Free Text)


Assessment: 





                                       Assessment:








             Urinary bleeding.





            Acute anemia secondary to acute blood loss.





           CAD with CHF.








           Prediabetes.





           HTN


Plan: 





                                 Plan:








            Continue IV antibiotics.





           Check C/S urine

## 2018-08-31 NOTE — CP.PCM.PN
Subjective





- Date & Time of Evaluation


Date of Evaluation: 08/31/18


Time of Evaluation: 13:20





- Subjective


Subjective: 





Hematuria recurs after a dose of Lovenox. Will hold Lovenox, Brilinta until 

hematuria stopped.





Objective





- Vital Signs/Intake and Output


Vital Signs (last 24 hours): 


 











Temp Pulse Resp BP Pulse Ox


 


 97.7 F   73   20   109/68   98 


 


 08/31/18 16:32  08/31/18 16:32  08/31/18 16:32  08/31/18 16:32  08/31/18 16:32








Intake and Output: 


 











 08/31/18 09/01/18





 18:59 06:59


 


Intake Total 210 


 


Output Total 1100 340


 


Balance -890 -340














- Medications


Medications: 


 Current Medications





Carvedilol (Coreg)  6.25 mg PO BID [facility]


   Last Admin: 08/31/18 18:31 Dose:  6.25 mg


Enoxaparin Sodium (Lovenox)  80 mg SC Q12 [facility]


   Last Admin: 08/29/18 21:12 Dose:  Not Given


Finasteride (Proscar)  5 mg PO DAILY [facility]


   Last Admin: 08/31/18 10:52 Dose:  5 mg


Furosemide (Lasix)  40 mg PO DAILY [facility]


   Last Admin: 08/31/18 10:52 Dose:  40 mg


Meropenem 1 gm/ Sodium (Chloride)  100 mls @ 100 mls/hr IVPB Q8H [facility]


   PRN Reason: Protocol


   Last Admin: 08/31/18 18:31 Dose:  100 mls/hr


Ferric Sodium Gluconate Complex 125 mg/ Sodium Chloride  110 mls @ 100 mls/hr 

IVPB DAILY [facility]


   Stop: 09/04/18 18:01


   Last Admin: 08/31/18 10:52 Dose:  100 mls/hr


Losartan Potassium (Cozaar)  100 mg PO DAILY [facility]


   Last Admin: 08/31/18 10:52 Dose:  100 mg


Methylprednisolone (Solu-Medrol)  125 mg IV ONCE [facility]


   PRN Reason: ALLERGY TO MEROPENEM


Rosuvastatin Calcium (Crestor)  10 mg PO HS [facility]


   Last Admin: 08/31/18 21:42 Dose:  10 mg


Tamsulosin HCl (Flomax)  0.4 mg PO DAILY [facility]


   Last Admin: 08/31/18 10:52 Dose:  0.4 mg











- Labs


Labs: 


 





 08/31/18 07:18 





 08/31/18 07:18 











- Constitutional


Appears: Well, No Acute Distress





- Head Exam


Head Exam: NORMAL INSPECTION





- Eye Exam


Eye Exam: Normal appearance





- ENT Exam


ENT Exam: Normal Exam





- Neck Exam


Neck Exam: Normal Inspection





- Respiratory Exam


Respiratory Exam: Clear to Ausculation Bilateral, NORMAL BREATHING PATTERN





- Cardiovascular Exam


Cardiovascular Exam: REGULAR RHYTHM





- GI/Abdominal Exam


GI & Abdominal Exam: Soft, Normal Bowel Sounds





- Rectal Exam


Rectal Exam: Deferred





-  Exam


 Exam: NORMAL INSPECTION





- Extremities Exam


Extremities Exam: Normal Inspection





- Back Exam


Back Exam: NORMAL INSPECTION





- Neurological Exam


Neurological Exam: Alert, Awake, Normal Gait, Oriented x3





- Psychiatric Exam


Psychiatric exam: Anxious





- Skin


Skin Exam: Dry, Normal Color, Warm





Assessment and Plan


(1) Hematuria


Assessment & Plan: 


S/p laser lithotrypsy and ureteral stent insertion. To hold Lovenox and 

Brillinta until hematuria stop.


Status: Acute   





(2) UTI (urinary tract infection)


Assessment & Plan: 


On IV Meropenem.


Status: Acute   





(3) Anemia associated with acute blood loss


Status: Acute

## 2018-09-01 VITALS — RESPIRATION RATE: 20 BRPM

## 2018-09-01 LAB
BASOPHILS # BLD AUTO: 0.1 K/UL (ref 0–0.2)
BASOPHILS NFR BLD: 0.8 % (ref 0–2)
CK MB SERPL-MCNC: 0.46 NG/ML (ref 0–3.38)
EOSINOPHIL # BLD AUTO: 0.2 K/UL (ref 0–0.7)
EOSINOPHIL NFR BLD: 3.6 % (ref 0–4)
ERYTHROCYTE [DISTWIDTH] IN BLOOD BY AUTOMATED COUNT: 19.7 % (ref 11.5–14.5)
HGB BLD-MCNC: 10.5 G/DL (ref 12–18)
LYMPHOCYTES # BLD AUTO: 1.3 K/UL (ref 1–4.3)
LYMPHOCYTES NFR BLD AUTO: 20.6 % (ref 20–40)
MCH RBC QN AUTO: 27 PG (ref 27–31)
MCHC RBC AUTO-ENTMCNC: 33.1 G/DL (ref 33–37)
MCV RBC AUTO: 81.7 FL (ref 80–94)
MONOCYTES # BLD: 0.8 K/UL (ref 0–0.8)
MONOCYTES NFR BLD: 11.9 % (ref 0–10)
NEUTROPHILS # BLD: 4.1 K/UL (ref 1.8–7)
NEUTROPHILS NFR BLD AUTO: 63.1 % (ref 50–75)
NRBC BLD AUTO-RTO: 0 % (ref 0–2)
PLATELET # BLD: 262 K/UL (ref 130–400)
PMV BLD AUTO: 8.7 FL (ref 7.2–11.7)
RBC # BLD AUTO: 3.87 MIL/UL (ref 4.4–5.9)
STONE COMPONENT 2: (no result)
WBC # BLD AUTO: 6.6 K/UL (ref 4.8–10.8)

## 2018-09-01 RX ADMIN — SODIUM FERRIC GLUCONATE COMPLEX SCH MLS/HR: 12.5 INJECTION INTRAVENOUS at 10:13

## 2018-09-01 RX ADMIN — NITROGLYCERIN SCH PATCH: 0.6 PATCH TRANSDERMAL at 16:44

## 2018-09-01 NOTE — CP.PCM.PN
Subjective





- Date & Time of Evaluation


Date of Evaluation: 09/01/18


Time of Evaluation: 15:06





- Subjective


Subjective: 





           Patient started to have chest pains lasting for over 5 minutes. 

Vital signs stable.ECG--Olf inf wall infarction, Wide QRS with early 

repolarization





     repolarization changes. Connected to telemetry, and given Brilinta stat 

dose of 90 MGM. troponin obtained. Patient has stopped bleeding after Lovenox





    was discontinued last night. Hgb stable at 10.5 this AM.





Objective





- Vital Signs/Intake and Output


Vital Signs (last 24 hours): 


 











Temp Pulse Resp BP Pulse Ox


 


 98.0 F   75   18   124/73   97 


 


 09/01/18 07:41  09/01/18 07:41  09/01/18 07:41  09/01/18 10:14  09/01/18 07:41








Intake and Output: 


 











 09/01/18 09/01/18





 06:59 18:59


 


Intake Total 100 


 


Output Total 820 


 


Balance -720 














- Medications


Medications: 


 Current Medications





Carvedilol (Coreg)  6.25 mg PO BID [facility]


   Last Admin: 09/01/18 10:14 Dose:  6.25 mg


Enoxaparin Sodium (Lovenox)  80 mg SC Q12 [facility]


   Last Admin: 08/29/18 21:12 Dose:  Not Given


Finasteride (Proscar)  5 mg PO DAILY [facility]


   Last Admin: 09/01/18 10:14 Dose:  5 mg


Furosemide (Lasix)  40 mg PO DAILY [facility]


   Last Admin: 09/01/18 10:14 Dose:  40 mg


Meropenem 1 gm/ Sodium (Chloride)  100 mls @ 100 mls/hr IVPB Q8H [facility]


   PRN Reason: Protocol


   Last Admin: 09/01/18 11:16 Dose:  100 mls/hr


Ferric Sodium Gluconate Complex 125 mg/ Sodium Chloride  110 mls @ 100 mls/hr 

IVPB DAILY [facility]


   Stop: 09/04/18 18:01


   Last Admin: 09/01/18 10:13 Dose:  100 mls/hr


Losartan Potassium (Cozaar)  100 mg PO DAILY [facility]


   Last Admin: 09/01/18 10:14 Dose:  100 mg


Methylprednisolone (Solu-Medrol)  125 mg IV ONCE PRN


   PRN Reason: ALLERGY TO MEROPENEM


Rosuvastatin Calcium (Crestor)  10 mg PO HS [facility]


   Last Admin: 08/31/18 21:42 Dose:  10 mg


Tamsulosin HCl (Flomax)  0.4 mg PO DAILY [facility]


   Last Admin: 09/01/18 10:14 Dose:  0.4 mg


Ticagrelor (Brilinta)  90 mg PO Q12 [facility]


   Last Admin: 09/01/18 14:56 Dose:  90 mg











- Labs


Labs: 


 





 09/01/18 08:07 





 08/31/18 07:18 











- Constitutional


Appears: Well, Non-toxic, No Acute Distress





- Head Exam


Head Exam: ATRAUMATIC, NORMAL INSPECTION, NORMOCEPHALIC





- Eye Exam


Eye Exam: Normal appearance


Pupil Exam: PERRL





- ENT Exam


ENT Exam: Mucous Membranes Moist, Normal Exam, Normal External Ear Exam





- Neck Exam


Neck Exam: Full ROM





- Respiratory Exam


Respiratory Exam: Clear to Ausculation Bilateral, NORMAL BREATHING PATTERN





- Cardiovascular Exam


Cardiovascular Exam: +S1, +S2


Additional comments: 





                        C/O chest pains.





- GI/Abdominal Exam


GI & Abdominal Exam: Soft, Normal Bowel Sounds





- Rectal Exam


Rectal Exam: Deferred





- Extremities Exam


Extremities Exam: Full ROM, Normal Inspection





- Back Exam


Back Exam: Full ROM, NORMAL INSPECTION





- Neurological Exam


Neurological Exam: Alert, Awake





- Psychiatric Exam


Psychiatric exam: Anxious, Normal Affect, Normal Mood





- Skin


Skin Exam: Dry, Intact, Normal Color, Warm





Assessment and Plan


(1) Hematuria


Status: Acute   





(2) UTI (urinary tract infection)


Status: Acute   





(3) Anemia


Status: Chronic   





(4) CAD (coronary artery disease)


Status: Chronic   





(5) Coronary artery disease


Status: Chronic   





(6) Hypertension


Status: Chronic   





(7) Congestive heart failure (CHF)


Status: Chronic   





(8) Pre-diabetes


Status: Chronic   





(9) Genitourinary bleeding


Status: Acute   





(10) Chest pain due to CAD


Status: Acute   





- Assessment and Plan (Free Text)


Plan: 





                                   Plan:








             Telemetry.





            MAKAYLA 1 panel.








               ECG stat.





            Continue IV antibiotics.

## 2018-09-01 NOTE — PN
Copied To:  Artur Lomas MD

Attending MD:  Artur Lomas MD



DATE:  08/31/2018



SUBJECTIVE:  The patient was seen today.  He was having hematuria.  He

denied any abdominal pain.  No nausea, no vomiting.  No back pain, but he

says he was started on Lovenox for his heart condition and that probably

gave him hematuria.  He was also seen by the urologist today.



PHYSICAL EXAMINATION:

VITAL SIGNS:  T-max is 97.7, pulse is 73, blood pressure 109/68,

respirations are 20.

HEENT:  Head is atraumatic, normocephalic.

NECK:  Supple.

LUNGS:  Clear.  No crackles or rales present.

HEART:  S1, S2 regular.

ABDOMEN:  Soft, nontender.  No guarding, no rigidity present.

EXTREMITIES:  Have no edema at this time.



He did had hematuria and he had a urinal implant.  He has been voiding

well; otherwise, he said they are going to do another procedure as they

only could remove partially the stone, so he has a complicated UTI with

hematuria with hydronephrosis.



Hemoglobin today was 10.5, hematocrit 30.6, and platelets 259.  He got

transfusion _____, it seems.  BUN is 17, creatinine 1.  I had ordered a

repeat urine culture yesterday, which came out negative.  He had Proteus in

the urine before which was resistant and was only sensitive to meropenem.



ASSESSMENT AND PLAN:  So at this time, I am going to continue meropenem as

he is still having _____ hematuria and we will follow with the urologist as

well as the primary.





__________________________________________

Artur Lomas MD





DD:  08/31/2018 22:20:21

DT:  08/31/2018 22:22:25

Job # [id number]

## 2018-09-01 NOTE — CP.PCM.PN
Subjective





- Date & Time of Evaluation


Date of Evaluation: 09/01/18


Time of Evaluation: 08:30





- Subjective


Subjective: 





Patient had an episode of chest burning this afternoon. Stat ECG was unchanged. 

Brillinta restarted by Dr Burnham. The patient has no complaint now, Urine is 

clear.





Objective





- Vital Signs/Intake and Output


Vital Signs (last 24 hours): 


 











Temp Pulse Resp BP Pulse Ox


 


 97.9 F   62   20   122/78   98 


 


 09/01/18 15:00  09/01/18 18:00  09/01/18 15:00  09/01/18 15:00  09/01/18 15:00








Intake and Output: 


 











 09/01/18 09/02/18





 18:59 06:59


 


Intake Total 210 


 


Output Total 1200 950


 


Balance -990 -950














- Medications


Medications: 


 Current Medications





Carvedilol (Coreg)  6.25 mg PO BID [facility]


   Last Admin: 09/01/18 18:49 Dose:  6.25 mg


Enoxaparin Sodium (Lovenox)  80 mg SC Q12 [facility]


   Last Admin: 08/29/18 21:12 Dose:  Not Given


Famotidine (Pepcid)  40 mg PO DAILY [facility]


Finasteride (Proscar)  5 mg PO DAILY [facility]


   Last Admin: 09/01/18 10:14 Dose:  5 mg


Furosemide (Lasix)  40 mg PO DAILY [facility]


   Last Admin: 09/01/18 10:14 Dose:  40 mg


Meropenem 1 gm/ Sodium (Chloride)  100 mls @ 100 mls/hr IVPB Q8H [facility]


   PRN Reason: Protocol


   Last Admin: 09/01/18 18:48 Dose:  100 mls/hr


Ferric Sodium Gluconate Complex 125 mg/ Sodium Chloride  110 mls @ 100 mls/hr 

IVPB DAILY [facility]


   Stop: 09/04/18 18:01


   Last Admin: 09/01/18 10:13 Dose:  100 mls/hr


Losartan Potassium (Cozaar)  100 mg PO DAILY [facility]


   Last Admin: 09/01/18 10:14 Dose:  100 mg


Methylprednisolone (Solu-Medrol)  125 mg IV ONCE PRN


   PRN Reason: ALLERGY TO MEROPENEM


Nitroglycerin (Nitro-Dur 0.6 Mg/Hr Patch)  1 patch TD DAILY [facility]


   Last Admin: 09/01/18 16:44 Dose:  1 patch


Rosuvastatin Calcium (Crestor)  10 mg PO HS [facility]


   Last Admin: 09/01/18 21:45 Dose:  10 mg


Tamsulosin HCl (Flomax)  0.4 mg PO DAILY [facility]


   Last Admin: 09/01/18 10:14 Dose:  0.4 mg


Ticagrelor (Brilinta)  90 mg PO Q12 [facility]


   Last Admin: 09/01/18 21:45 Dose:  90 mg











- Labs


Labs: 


 





 09/01/18 08:07 





 08/31/18 07:18 











- Constitutional


Appears: No Acute Distress





- Head Exam


Head Exam: NORMAL INSPECTION





- Eye Exam


Eye Exam: Normal appearance





- ENT Exam


ENT Exam: Normal Exam





- Neck Exam


Neck Exam: Normal Inspection





- Respiratory Exam


Respiratory Exam: Clear to Ausculation Bilateral, NORMAL BREATHING PATTERN





- Cardiovascular Exam


Cardiovascular Exam: REGULAR RHYTHM





- GI/Abdominal Exam


GI & Abdominal Exam: Soft, Normal Bowel Sounds





- Rectal Exam


Rectal Exam: Deferred





-  Exam


 Exam: NORMAL INSPECTION





- Extremities Exam


Extremities Exam: Normal Inspection





- Back Exam


Back Exam: NORMAL INSPECTION





- Neurological Exam


Neurological Exam: Alert, Awake, Normal Gait, Oriented x3





- Psychiatric Exam


Psychiatric exam: Anxious





- Skin


Skin Exam: Dry, Intact, Warm





Assessment and Plan


(1) Hematuria


Status: Acute   





(2) UTI (urinary tract infection)


Assessment & Plan: 


To continue IV antibiotic


Status: Acute   





(3) Anemia associated with acute blood loss


Status: Acute

## 2018-09-01 NOTE — RAD
Date of service: 



09/01/2018



HISTORY:

urolithiasis  



COMPARISON:

No prior.



FINDINGS:

Re- demonstrated is in situ left ureteral stent. Evaluation for renal 

calculi is limited due to overlying bowel related artifact 



BONES:

Normal.



OTHER FINDINGS:

None.



IMPRESSION:

No in situ left ureteral stent.

## 2018-09-02 RX ADMIN — SODIUM FERRIC GLUCONATE COMPLEX SCH MLS/HR: 12.5 INJECTION INTRAVENOUS at 10:21

## 2018-09-02 RX ADMIN — NITROGLYCERIN SCH PATCH: 0.6 PATCH TRANSDERMAL at 10:25

## 2018-09-02 NOTE — CP.PCM.PN
Subjective





- Date & Time of Evaluation


Date of Evaluation: 09/02/18


Time of Evaluation: 14:05





- Subjective


Subjective: 





                                                Patient has no chest pains 

today. ECG no change. Troponin normal. Feels better with pepcid PO.





          Started on Brilinta, and urine is blood tinged again.HGB is stable at 

10.5. On daily Ferlecet infusion. Patient's urine had been re-cultured and is





         negative.  On 8th day I.v antibiotics. Dr. Bush wants him to be on 

IV antibiotics for 10 days. 





Objective





- Vital Signs/Intake and Output


Vital Signs (last 24 hours): 


 











Temp Pulse Resp BP Pulse Ox


 


 98.0 F   89   20   102/62   99 


 


 09/02/18 07:04  09/02/18 09:17  09/02/18 07:04  09/02/18 09:17  09/02/18 07:04








Intake and Output: 


 











 09/02/18 09/02/18





 06:59 18:59


 


Output Total 950 1675


 


Balance -950 -1675














- Medications


Medications: 


 Current Medications





Carvedilol (Coreg)  6.25 mg PO BID [facility]


   Last Admin: 09/02/18 09:17 Dose:  6.25 mg


Enoxaparin Sodium (Lovenox)  80 mg SC Q12 [facility]


   Last Admin: 08/29/18 21:12 Dose:  Not Given


Famotidine (Pepcid)  40 mg PO DAILY [facility]


   Last Admin: 09/02/18 09:17 Dose:  40 mg


Finasteride (Proscar)  5 mg PO DAILY [facility]


   Last Admin: 09/02/18 09:17 Dose:  5 mg


Furosemide (Lasix)  40 mg PO DAILY [facility]


   Last Admin: 09/02/18 09:17 Dose:  40 mg


Meropenem 1 gm/ Sodium (Chloride)  100 mls @ 100 mls/hr IVPB Q8H [facility]


   PRN Reason: Protocol


   Last Admin: 09/02/18 11:55 Dose:  100 mls/hr


Ferric Sodium Gluconate Complex 125 mg/ Sodium Chloride  110 mls @ 100 mls/hr 

IVPB DAILY [facility]


   Stop: 09/04/18 18:01


   Last Admin: 09/02/18 10:21 Dose:  100 mls/hr


Losartan Potassium (Cozaar)  100 mg PO DAILY [facility]


   Last Admin: 09/02/18 09:17 Dose:  100 mg


Methylprednisolone (Solu-Medrol)  125 mg IV ONCE PRN


   PRN Reason: ALLERGY TO MEROPENEM


Nitroglycerin (Nitro-Dur 0.6 Mg/Hr Patch)  1 patch TD DAILY [facility]


   Last Admin: 09/02/18 10:25 Dose:  1 patch


Rosuvastatin Calcium (Crestor)  10 mg PO HS [facility]


   Last Admin: 09/01/18 21:45 Dose:  10 mg


Tamsulosin HCl (Flomax)  0.4 mg PO DAILY [facility]


   Last Admin: 09/02/18 10:25 Dose:  0.4 mg


Ticagrelor (Brilinta)  90 mg PO Q12 [facility]


   Last Admin: 09/02/18 10:21 Dose:  90 mg











- Labs


Labs: 


 





 09/01/18 08:07 





 08/31/18 07:18 











- Constitutional


Appears: Well, Non-toxic, No Acute Distress





- Head Exam


Head Exam: ATRAUMATIC, NORMAL INSPECTION, NORMOCEPHALIC





- Eye Exam


Eye Exam: EOMI, Normal appearance


Pupil Exam: PERRL





- ENT Exam


ENT Exam: Mucous Membranes Moist, Normal External Ear Exam





- Neck Exam


Neck Exam: Full ROM, Normal Inspection





- Respiratory Exam


Respiratory Exam: Clear to Ausculation Bilateral, NORMAL BREATHING PATTERN





- Cardiovascular Exam


Cardiovascular Exam: REGULAR RHYTHM, +S1, +S2





- GI/Abdominal Exam


GI & Abdominal Exam: Soft, Normal Bowel Sounds





- Rectal Exam


Rectal Exam: Deferred





- Extremities Exam


Extremities Exam: Full ROM





- Back Exam


Back Exam: NORMAL INSPECTION





- Neurological Exam


Neurological Exam: Alert, Awake, Normal Gait, Oriented x3





- Psychiatric Exam


Psychiatric exam: Normal Affect, Normal Mood





- Skin


Skin Exam: Dry, Intact, Normal Color, Warm





Assessment and Plan


(1) Hematuria


Status: Acute   





(2) UTI (urinary tract infection)


Status: Acute   





(3) Anemia


Status: Chronic   





(4) CAD (coronary artery disease)


Status: Chronic   





(5) Coronary artery disease


Status: Chronic   





(6) Hypertension


Status: Chronic   





(7) Congestive heart failure (CHF)


Status: Chronic   





(8) Pre-diabetes


Status: Chronic   





(9) Genitourinary bleeding


Status: Acute   





(10) Chest pain due to CAD


Status: Acute   





- Assessment and Plan (Free Text)


Assessment: 





                                                   Assessment:





               CAD.





              Nephrolithiasis.





             Hemturia acute.





            Anemia seconadry to blood loss.








           Prediabetes.





         


            Hyperlipidemia.





           Hypertension.





            BPH


Plan: 





                                      Plan:








           Continue IV antibiotics for 2 more days.





          Keep on Brilinta.





          Continue Nitroglycerin patch.





          Continue telemetry.

## 2018-09-02 NOTE — CP.PCM.PN
Subjective





- Date & Time of Evaluation


Date of Evaluation: 09/02/18


Time of Evaluation: 15:00





- Subjective


Subjective: 





dictated





Objective





- Vital Signs/Intake and Output


Vital Signs (last 24 hours): 


 











Temp Pulse Resp BP Pulse Ox


 


 97.7 F   82   20   109/60   99 


 


 09/02/18 15:04  09/02/18 18:00  09/02/18 15:04  09/02/18 15:04  09/02/18 15:04








Intake and Output: 


 











 09/02/18 09/03/18





 18:59 06:59


 


Intake Total 200 


 


Output Total 1675 


 


Balance -1475 














- Medications


Medications: 


 Current Medications





Carvedilol (Coreg)  6.25 mg PO BID [facility]


   Last Admin: 09/02/18 17:57 Dose:  6.25 mg


Enoxaparin Sodium (Lovenox)  80 mg SC Q12 [facility]


   Last Admin: 08/29/18 21:12 Dose:  Not Given


Famotidine (Pepcid)  40 mg PO DAILY [facility]


   Last Admin: 09/02/18 09:17 Dose:  40 mg


Finasteride (Proscar)  5 mg PO DAILY [facility]


   Last Admin: 09/02/18 09:17 Dose:  5 mg


Furosemide (Lasix)  40 mg PO DAILY [facility]


   Last Admin: 09/02/18 09:17 Dose:  40 mg


Meropenem 1 gm/ Sodium (Chloride)  100 mls @ 100 mls/hr IVPB Q8H [facility]


   PRN Reason: Protocol


   Last Admin: 09/02/18 19:21 Dose:  100 mls/hr


Ferric Sodium Gluconate Complex 125 mg/ Sodium Chloride  110 mls @ 100 mls/hr 

IVPB DAILY [facility]


   Stop: 09/04/18 18:01


   Last Admin: 09/02/18 10:21 Dose:  100 mls/hr


Losartan Potassium (Cozaar)  100 mg PO DAILY [facility]


   Last Admin: 09/02/18 09:17 Dose:  100 mg


Methylprednisolone (Solu-Medrol)  125 mg IV ONCE PRN


   PRN Reason: ALLERGY TO MEROPENEM


Nitroglycerin (Nitro-Dur 0.6 Mg/Hr Patch)  1 patch TD DAILY [facility]


   Last Admin: 09/02/18 10:25 Dose:  1 patch


Rosuvastatin Calcium (Crestor)  10 mg PO HS [facility]


   Last Admin: 09/01/18 21:45 Dose:  10 mg


Tamsulosin HCl (Flomax)  0.4 mg PO DAILY [facility]


   Last Admin: 09/02/18 10:25 Dose:  0.4 mg


Ticagrelor (Brilinta)  90 mg PO Q12 [facility]


   Last Admin: 09/02/18 10:21 Dose:  90 mg











- Labs


Labs: 


 





 09/01/18 08:07 





 08/31/18 07:18

## 2018-09-03 LAB
ALBUMIN SERPL-MCNC: 4.1 G/DL (ref 3.5–5)
ALBUMIN/GLOB SERPL: 1.1 {RATIO} (ref 1–2.1)
ALT SERPL-CCNC: 19 U/L (ref 21–72)
AST SERPL-CCNC: 23 U/L (ref 17–59)
BACTERIA #/AREA URNS HPF: (no result) /[HPF]
BASOPHILS # BLD AUTO: 0.1 K/UL (ref 0–0.2)
BASOPHILS NFR BLD: 0.7 % (ref 0–2)
BILIRUB UR-MCNC: NEGATIVE MG/DL
BUN SERPL-MCNC: 21 MG/DL (ref 9–20)
CALCIUM SERPL-MCNC: 8.9 MG/DL (ref 8.6–10.4)
EOSINOPHIL # BLD AUTO: 0.2 K/UL (ref 0–0.7)
EOSINOPHIL NFR BLD: 2.1 % (ref 0–4)
ERYTHROCYTE [DISTWIDTH] IN BLOOD BY AUTOMATED COUNT: 19.8 % (ref 11.5–14.5)
ERYTHROCYTE [DISTWIDTH] IN BLOOD BY AUTOMATED COUNT: 20.2 % (ref 11.5–14.5)
GFR NON-AFRICAN AMERICAN: > 60
GLUCOSE UR STRIP-MCNC: NORMAL MG/DL
HGB BLD-MCNC: 10.8 G/DL (ref 12–18)
HGB BLD-MCNC: 12.3 G/DL (ref 12–18)
LEUKOCYTE ESTERASE UR-ACNC: (no result) LEU/UL
LYMPHOCYTES # BLD AUTO: 1.2 K/UL (ref 1–4.3)
LYMPHOCYTES NFR BLD AUTO: 16.7 % (ref 20–40)
MCH RBC QN AUTO: 27.6 PG (ref 27–31)
MCH RBC QN AUTO: 27.7 PG (ref 27–31)
MCHC RBC AUTO-ENTMCNC: 33.1 G/DL (ref 33–37)
MCHC RBC AUTO-ENTMCNC: 33.3 G/DL (ref 33–37)
MCV RBC AUTO: 83.2 FL (ref 80–94)
MCV RBC AUTO: 83.3 FL (ref 80–94)
MONOCYTES # BLD: 0.7 K/UL (ref 0–0.8)
MONOCYTES NFR BLD: 9.6 % (ref 0–10)
NEUTROPHILS # BLD: 5.3 K/UL (ref 1.8–7)
NEUTROPHILS NFR BLD AUTO: 70.9 % (ref 50–75)
NRBC BLD AUTO-RTO: 0.1 % (ref 0–2)
PH UR STRIP: 7 [PH] (ref 5–8)
PLATELET # BLD: 269 K/UL (ref 130–400)
PLATELET # BLD: 300 K/UL (ref 130–400)
PMV BLD AUTO: 8.6 FL (ref 7.2–11.7)
PMV BLD AUTO: 8.6 FL (ref 7.2–11.7)
PROT UR STRIP-MCNC: NEGATIVE MG/DL
RBC # BLD AUTO: 3.89 MIL/UL (ref 4.4–5.9)
RBC # BLD AUTO: 4.46 MIL/UL (ref 4.4–5.9)
RBC # UR STRIP: (no result) /UL
SP GR UR STRIP: 1 (ref 1–1.03)
SQUAMOUS EPITHIAL: < 1 /HPF (ref 0–5)
UROBILINOGEN UR-MCNC: NORMAL MG/DL (ref 0.2–1)
WBC # BLD AUTO: 7.2 K/UL (ref 4.8–10.8)
WBC # BLD AUTO: 7.4 K/UL (ref 4.8–10.8)

## 2018-09-03 RX ADMIN — SODIUM FERRIC GLUCONATE COMPLEX SCH MLS/HR: 12.5 INJECTION INTRAVENOUS at 10:22

## 2018-09-03 RX ADMIN — NITROGLYCERIN SCH PATCH: 0.6 PATCH TRANSDERMAL at 09:13

## 2018-09-03 NOTE — CP.PCM.PN
Subjective





- Date & Time of Evaluation


Date of Evaluation: 09/03/18


Time of Evaluation: 12:13





- Subjective


Subjective: 





                            Patient seen and examined. Urine is clearer today. 

Hgb is 10.8 this AM. No chest pains. On 10th day of IV antibiotics tomorrow. 





       Discussed with Dr. Glory Oakes the possiblility of discharging this 

patient after tomorrow and he claims that the stone can be removed in other 

ways.





      Will discuss with Dr. Bush about discharging this patient after the 

10th day of IV antibiotics.





Objective





- Vital Signs/Intake and Output


Vital Signs (last 24 hours): 


 











Temp Pulse Resp BP Pulse Ox


 


 97.9 F   88   20   103/72   99 


 


 09/03/18 07:57  09/03/18 09:13  09/03/18 07:57  09/03/18 09:13  09/03/18 07:57








Intake and Output: 


 











 09/03/18 09/03/18





 06:59 18:59


 


Intake Total 2100 


 


Output Total 1600 1225


 


Balance 500 -1225














- Medications


Medications: 


 Current Medications





Carvedilol (Coreg)  6.25 mg PO BID [facility]


   Last Admin: 09/03/18 09:13 Dose:  6.25 mg


Enoxaparin Sodium (Lovenox)  80 mg SC Q12 [facility]


   Last Admin: 08/29/18 21:12 Dose:  Not Given


Famotidine (Pepcid)  40 mg PO DAILY [facility]


   Last Admin: 09/03/18 09:13 Dose:  40 mg


Finasteride (Proscar)  5 mg PO DAILY [facility]


   Last Admin: 09/03/18 09:13 Dose:  5 mg


Furosemide (Lasix)  40 mg PO DAILY [facility]


   Last Admin: 09/03/18 09:13 Dose:  40 mg


Meropenem 1 gm/ Sodium (Chloride)  100 mls @ 100 mls/hr IVPB Q8H [facility]


   PRN Reason: Protocol


   Last Admin: 09/03/18 03:15 Dose:  100 mls/hr


Ferric Sodium Gluconate Complex 125 mg/ Sodium Chloride  110 mls @ 100 mls/hr 

IVPB DAILY [facility]


   Stop: 09/04/18 18:01


   Last Admin: 09/03/18 10:22 Dose:  100 mls/hr


Losartan Potassium (Cozaar)  100 mg PO DAILY [facility]


   Last Admin: 09/03/18 09:13 Dose:  100 mg


Methylprednisolone (Solu-Medrol)  125 mg IV ONCE PRN


   PRN Reason: ALLERGY TO MEROPENEM


Nitroglycerin (Nitro-Dur 0.6 Mg/Hr Patch)  1 patch TD DAILY [facility]


   Last Admin: 09/03/18 09:13 Dose:  1 patch


Rosuvastatin Calcium (Crestor)  10 mg PO HS [facility]


   Last Admin: 09/02/18 22:39 Dose:  10 mg


Tamsulosin HCl (Flomax)  0.4 mg PO DAILY [facility]


   Last Admin: 09/03/18 09:13 Dose:  0.4 mg


Ticagrelor (Brilinta)  90 mg PO Q12 [facility]


   Last Admin: 09/03/18 09:13 Dose:  90 mg











- Labs


Labs: 


 





 09/03/18 07:59 





 08/31/18 07:18 











- Constitutional


Appears: Well, Non-toxic, No Acute Distress





- Head Exam


Head Exam: ATRAUMATIC, NORMAL INSPECTION, NORMOCEPHALIC





- Eye Exam


Eye Exam: EOMI, Normal appearance


Pupil Exam: PERRL





- ENT Exam


ENT Exam: Mucous Membranes Moist, Normal Exam





- Neck Exam


Neck Exam: Full ROM





- Respiratory Exam


Respiratory Exam: Clear to Ausculation Bilateral, NORMAL BREATHING PATTERN





- Cardiovascular Exam


Cardiovascular Exam: REGULAR RHYTHM, +S1, +S2





- GI/Abdominal Exam


GI & Abdominal Exam: Soft, Normal Bowel Sounds





- Rectal Exam


Rectal Exam: Deferred





- Extremities Exam


Extremities Exam: Full ROM, Normal Inspection





- Back Exam


Back Exam: Full ROM, NORMAL INSPECTION





- Neurological Exam


Neurological Exam: Alert, Awake, Normal Gait, Oriented x3





- Psychiatric Exam


Psychiatric exam: Normal Affect, Normal Mood





- Skin


Skin Exam: Dry, Intact, Normal Color, Warm





Assessment and Plan


(1) Hematuria


Status: Acute   





(2) UTI (urinary tract infection)


Status: Acute   





(3) Anemia


Status: Chronic   





(4) CAD (coronary artery disease)


Status: Chronic   





(5) Coronary artery disease


Status: Chronic   





(6) Hypertension


Status: Chronic   





(7) Congestive heart failure (CHF)


Status: Chronic   





(8) Pre-diabetes


Status: Chronic   





(9) Genitourinary bleeding


Status: Acute   





(10) Chest pain due to CAD


Status: Acute   





- Assessment and Plan (Free Text)


Assessment: 





                              Assessment:








              CAD





             Nephrolithiasis





              UTI.





            BPH





             Prediabetes.





             Hyperlipidemia.





            





    


Plan: 





                    Plan:





         Continue IV antibiotics 





        Continue Brilinta





       Continue Telemetry.

## 2018-09-03 NOTE — PN
Copied To:  Artur Lomas MD

Attending MD:  Artur Lomas MD



DATE:  08/25/2018



SUBJECTIVE:  Today, he was seen.  He denies any nausea or vomiting.  No

chest pain.  No abdominal pain.  He says his urine is clearer now and he

says Dr. Burnham saw him earlier than me.



PHYSICAL EXAMINATION:

VITAL SIGNS:  T-max is 97.7, pulse 76, blood pressure is 109/60,

respirations are 20.

HEENT:  Head is atraumatic, normocephalic.

NECK:  Supple

LUNGS:  Clear.

HEART:  S1 and S2 are present and regular.

ABDOMEN:  Soft, nontender.  No guarding, no rigidity present.  No CVA

tenderness present.

EXTREMITIES:  Have no edema.



The patient is still on meropenem, and we will continue that till Wednesday

morning and then probably can be discontinued.  There is no oral antibiotic

to give for this proteus, so he will be going off antibiotics.  The

patient's vitals are stable at this time.



LABORATORY DATA:  Labs show white count is 6.6, hemoglobin 10.5.  BUN is

17, creatinine 1.0.



ASSESSMENT AND PLAN:  The patient is clinically improving, hematuria is

better.  We will continue meropenem at this time for proteus that he had in

the urine and he had complicated urinary tract infection with

hydronephrosis.





__________________________________________

Artur Lomas MD





DD:  09/02/2018 21:34:44

DT:  09/02/2018 21:36:12

Job # [id number] done

## 2018-09-03 NOTE — CP.PCM.PN
Subjective





- Date & Time of Evaluation


Date of Evaluation: 09/03/18


Time of Evaluation: 14:00





- Subjective


Subjective: 





dictated





Objective





- Vital Signs/Intake and Output


Vital Signs (last 24 hours): 


 











Temp Pulse Resp BP Pulse Ox


 


 97.9 F   86   20   96/66 L  97 


 


 09/03/18 15:00  09/03/18 15:00  09/03/18 15:00  09/03/18 15:00  09/03/18 15:00








Intake and Output: 


 











 09/03/18 09/04/18





 18:59 06:59


 


Intake Total 200 


 


Output Total 1575 


 


Balance -1375 














- Medications


Medications: 


 Current Medications





Carvedilol (Coreg)  6.25 mg PO BID [facility]


   Last Admin: 09/03/18 17:23 Dose:  Not Given


Enoxaparin Sodium (Lovenox)  80 mg SC Q12 [facility]


   Last Admin: 08/29/18 21:12 Dose:  Not Given


Famotidine (Pepcid)  40 mg PO DAILY [facility]


   Last Admin: 09/03/18 09:13 Dose:  40 mg


Finasteride (Proscar)  5 mg PO DAILY [facility]


   Last Admin: 09/03/18 09:13 Dose:  5 mg


Furosemide (Lasix)  40 mg PO DAILY [facility]


   Last Admin: 09/03/18 09:13 Dose:  40 mg


Meropenem 1 gm/ Sodium (Chloride)  100 mls @ 100 mls/hr IVPB Q8H [facility]


   PRN Reason: Protocol


   Last Admin: 09/03/18 19:34 Dose:  100 mls/hr


Ferric Sodium Gluconate Complex 125 mg/ Sodium Chloride  110 mls @ 100 mls/hr 

IVPB DAILY [facility]


   Stop: 09/04/18 18:01


   Last Admin: 09/03/18 10:22 Dose:  100 mls/hr


Losartan Potassium (Cozaar)  100 mg PO DAILY [facility]


   Last Admin: 09/03/18 09:13 Dose:  100 mg


Methylprednisolone (Solu-Medrol)  125 mg IV ONCE PRN


   PRN Reason: ALLERGY TO MEROPENEM


Nitroglycerin (Nitro-Dur 0.6 Mg/Hr Patch)  1 patch TD DAILY [facility]


   Last Admin: 09/03/18 09:13 Dose:  1 patch


Rosuvastatin Calcium (Crestor)  10 mg PO HS [facility]


   Last Admin: 09/02/18 22:39 Dose:  10 mg


Tamsulosin HCl (Flomax)  0.4 mg PO DAILY [facility]


   Last Admin: 09/03/18 09:13 Dose:  0.4 mg


Ticagrelor (Brilinta)  90 mg PO Q12 [facility]


   Last Admin: 09/03/18 09:13 Dose:  90 mg











- Labs


Labs: 


 





 09/03/18 13:47 





 09/03/18 13:47

## 2018-09-03 NOTE — CP.PCM.PN
Subjective





- Date & Time of Evaluation


Date of Evaluation: 09/03/18


Time of Evaluation: 19:00





- Subjective


Subjective: 





Patient has no complaint of chest pain. Hematuria has stopped. To continue 

Brillinta and Meropenem.





Objective





- Vital Signs/Intake and Output


Vital Signs (last 24 hours): 


 











Temp Pulse Resp BP Pulse Ox


 


 97.9 F   88   20   96/66 L  97 


 


 09/03/18 15:00  09/03/18 15:00  09/03/18 15:00  09/03/18 15:00  09/03/18 15:00








Intake and Output: 


 











 09/03/18 09/04/18





 18:59 06:59


 


Intake Total 200 


 


Output Total 1575 


 


Balance -1375 














- Medications


Medications: 


 Current Medications





Carvedilol (Coreg)  6.25 mg PO BID [facility]


   Last Admin: 09/03/18 17:23 Dose:  Not Given


Famotidine (Pepcid)  40 mg PO DAILY [facility]


   Last Admin: 09/03/18 09:13 Dose:  40 mg


Finasteride (Proscar)  5 mg PO DAILY [facility]


   Last Admin: 09/03/18 09:13 Dose:  5 mg


Furosemide (Lasix)  40 mg PO DAILY [facility]


   Last Admin: 09/03/18 09:13 Dose:  40 mg


Meropenem 1 gm/ Sodium (Chloride)  100 mls @ 100 mls/hr IVPB Q8H [facility]


   PRN Reason: Protocol


   Last Admin: 09/03/18 19:34 Dose:  100 mls/hr


Ferric Sodium Gluconate Complex 125 mg/ Sodium Chloride  110 mls @ 100 mls/hr 

IVPB DAILY [facility]


   Stop: 09/04/18 18:01


   Last Admin: 09/03/18 10:22 Dose:  100 mls/hr


Losartan Potassium (Cozaar)  100 mg PO DAILY [facility]


   Last Admin: 09/03/18 09:13 Dose:  100 mg


Methylprednisolone (Solu-Medrol)  125 mg IV ONCE PRN


   PRN Reason: ALLERGY TO MEROPENEM


Nitroglycerin (Nitro-Dur 0.6 Mg/Hr Patch)  1 patch TD DAILY [facility]


   Last Admin: 09/03/18 09:13 Dose:  1 patch


Rosuvastatin Calcium (Crestor)  10 mg PO HS [facility]


   Last Admin: 09/03/18 22:43 Dose:  10 mg


Tamsulosin HCl (Flomax)  0.4 mg PO DAILY [facility]


   Last Admin: 09/03/18 09:13 Dose:  0.4 mg


Ticagrelor (Brilinta)  90 mg PO Q12 [facility]


   Last Admin: 09/03/18 22:43 Dose:  90 mg











- Labs


Labs: 


 





 09/03/18 13:47 





 09/03/18 13:47 











- Constitutional


Appears: Well, No Acute Distress





- Head Exam


Head Exam: NORMAL INSPECTION





- Eye Exam


Eye Exam: Normal appearance





- ENT Exam


ENT Exam: Normal Exam





- Neck Exam


Neck Exam: Normal Inspection





- Respiratory Exam


Respiratory Exam: Clear to Ausculation Bilateral, NORMAL BREATHING PATTERN





- Cardiovascular Exam


Cardiovascular Exam: REGULAR RHYTHM





- GI/Abdominal Exam


GI & Abdominal Exam: Soft, Normal Bowel Sounds





- Rectal Exam


Rectal Exam: Deferred





- Extremities Exam


Extremities Exam: Normal Inspection





- Back Exam


Back Exam: NORMAL INSPECTION





- Neurological Exam


Neurological Exam: Alert, Awake, Normal Gait, Oriented x3





- Psychiatric Exam


Psychiatric exam: Anxious





- Skin


Skin Exam: Dry, Intact, Warm





Assessment and Plan


(1) Hematuria


Status: Resolved   





(2) UTI (urinary tract infection)


Assessment & Plan: 


To continue IV antibiotic.


Status: Acute   





(3) Anemia associated with acute blood loss


Status: Acute

## 2018-09-04 RX ADMIN — SODIUM FERRIC GLUCONATE COMPLEX SCH MLS/HR: 12.5 INJECTION INTRAVENOUS at 10:23

## 2018-09-04 RX ADMIN — NITROGLYCERIN SCH PATCH: 0.6 PATCH TRANSDERMAL at 09:48

## 2018-09-04 NOTE — CP.PCM.PN
Subjective





- Date & Time of Evaluation


Date of Evaluation: 09/04/18


Time of Evaluation: 15:15





- Subjective


Subjective: 





dictated





Objective





- Vital Signs/Intake and Output


Vital Signs (last 24 hours): 


 











Temp Pulse Resp BP Pulse Ox


 


 98 F   77   20   110/69   98 


 


 09/04/18 16:00  09/04/18 18:51  09/04/18 16:00  09/04/18 18:51  09/04/18 16:00








Intake and Output: 


 











 09/04/18 09/05/18





 18:59 06:59


 


Intake Total 800 


 


Output Total 1800 


 


Balance -1000 














- Medications


Medications: 


 Current Medications





Carvedilol (Coreg)  6.25 mg PO BID [facility]


   Last Admin: 09/04/18 18:51 Dose:  6.25 mg


Famotidine (Pepcid)  40 mg PO DAILY [facility]


   Last Admin: 09/04/18 09:48 Dose:  40 mg


Finasteride (Proscar)  5 mg PO DAILY [facility]


   Last Admin: 09/04/18 09:48 Dose:  5 mg


Furosemide (Lasix)  40 mg PO DAILY [facility]


   Last Admin: 09/04/18 09:47 Dose:  40 mg


Meropenem 1 gm/ Sodium (Chloride)  100 mls @ 100 mls/hr IVPB Q8H [facility]


   PRN Reason: Protocol


   Last Admin: 09/04/18 18:50 Dose:  100 mls/hr


Losartan Potassium (Cozaar)  100 mg PO DAILY [facility]


   Last Admin: 09/04/18 09:47 Dose:  100 mg


Methylprednisolone (Solu-Medrol)  125 mg IV ONCE [facility]N


   PRN Reason: ALLERGY TO MEROPENEM


Nitroglycerin (Nitro-Dur 0.6 Mg/Hr Patch)  1 patch TD DAILY [facility]


   Last Admin: 09/04/18 09:48 Dose:  1 patch


Rosuvastatin Calcium (Crestor)  10 mg PO HS [facility]


   Last Admin: 09/04/18 21:36 Dose:  10 mg


Tamsulosin HCl (Flomax)  0.4 mg PO DAILY [facility]


   Last Admin: 09/04/18 09:47 Dose:  0.4 mg


Ticagrelor (Brilinta)  90 mg PO Q12 [facility]


   Last Admin: 09/04/18 21:36 Dose:  90 mg











- Labs


Labs: 


 





 09/03/18 13:47 





 09/03/18 13:47

## 2018-09-04 NOTE — CP.PCM.PN
Subjective





- Date & Time of Evaluation


Date of Evaluation: 09/04/18


Time of Evaluation: 09:46





- Subjective


Subjective: 





                                            Patient has no chest pains today. 

On his last day of IV Merem. Urine is clearer today. Hgb. is 12 today.





Objective





- Vital Signs/Intake and Output


Vital Signs (last 24 hours): 


 











Temp Pulse Resp BP Pulse Ox


 


 98 F   87   20   107/67   97 


 


 09/04/18 08:11  09/04/18 08:11  09/04/18 08:11  09/04/18 08:11  09/04/18 08:11











- Medications


Medications: 


 Current Medications





Carvedilol (Coreg)  6.25 mg PO BID [facility]


   Last Admin: 09/03/18 17:23 Dose:  Not Given


Famotidine (Pepcid)  40 mg PO DAILY [facility]


   Last Admin: 09/03/18 09:13 Dose:  40 mg


Finasteride (Proscar)  5 mg PO DAILY [facility]


   Last Admin: 09/03/18 09:13 Dose:  5 mg


Furosemide (Lasix)  40 mg PO DAILY [facility]


   Last Admin: 09/03/18 09:13 Dose:  40 mg


Meropenem 1 gm/ Sodium (Chloride)  100 mls @ 100 mls/hr IVPB Q8H [facility]


   PRN Reason: Protocol


   Last Admin: 09/04/18 03:06 Dose:  100 mls/hr


Ferric Sodium Gluconate Complex 125 mg/ Sodium Chloride  110 mls @ 100 mls/hr 

IVPB DAILY [facility]


   Stop: 09/04/18 18:01


   Last Admin: 09/03/18 10:22 Dose:  100 mls/hr


Losartan Potassium (Cozaar)  100 mg PO DAILY [facility]


   Last Admin: 09/03/18 09:13 Dose:  100 mg


Methylprednisolone (Solu-Medrol)  125 mg IV ONCE PRN


   PRN Reason: ALLERGY TO MEROPENEM


Nitroglycerin (Nitro-Dur 0.6 Mg/Hr Patch)  1 patch TD DAILY [facility]


   Last Admin: 09/03/18 09:13 Dose:  1 patch


Rosuvastatin Calcium (Crestor)  10 mg PO HS [facility]


   Last Admin: 09/03/18 22:43 Dose:  10 mg


Tamsulosin HCl (Flomax)  0.4 mg PO DAILY [facility]


   Last Admin: 09/03/18 09:13 Dose:  0.4 mg


Ticagrelor (Brilinta)  90 mg PO Q12 [facility]


   Last Admin: 09/04/18 09:45 Dose:  90 mg











- Labs


Labs: 


 





 09/03/18 13:47 





 09/03/18 13:47 











- Constitutional


Appears: Well, Non-toxic, No Acute Distress





- Head Exam


Head Exam: ATRAUMATIC, NORMAL INSPECTION





- Eye Exam


Eye Exam: EOMI


Pupil Exam: PERRL





- ENT Exam


ENT Exam: Normal External Ear Exam





- Neck Exam


Neck Exam: Full ROM





- Respiratory Exam


Respiratory Exam: NORMAL BREATHING PATTERN





- Cardiovascular Exam


Cardiovascular Exam: REGULAR RHYTHM, +S1, +S2





- GI/Abdominal Exam


GI & Abdominal Exam: Soft, Normal Bowel Sounds





- Rectal Exam


Rectal Exam: Deferred





- Extremities Exam


Extremities Exam: Full ROM, Normal Inspection





- Back Exam


Back Exam: Full ROM, NORMAL INSPECTION





- Neurological Exam


Neurological Exam: Alert, Awake, Oriented x3





- Psychiatric Exam


Psychiatric exam: Normal Affect, Normal Mood





- Skin


Skin Exam: Dry, Intact, Normal Color, Warm





Assessment and Plan


(1) Hematuria


Status: Resolved   





(2) UTI (urinary tract infection)


Status: Acute   





(3) Anemia


Status: Chronic   





(4) CAD (coronary artery disease)


Status: Chronic   





(5) Coronary artery disease


Status: Chronic   





(6) Hypertension


Status: Chronic   





(7) Congestive heart failure (CHF)


Status: Chronic   





(8) Pre-diabetes


Status: Chronic   





(9) Genitourinary bleeding


Status: Acute   





(10) Chest pain due to CAD


Status: Acute   





- Assessment and Plan (Free Text)


Assessment: 





                                       Assessment:








            Nephro;othiasis.





          CAD   CHF





         Acute UTI








        Anemia sec to acute blood loss.





     


Plan: 





                                  Plan:








           Continue Telemetry





         Continue IV antibiotics.

## 2018-09-04 NOTE — CON
Copied To:  Artur Lomas MD

Attending MD:  Artur Lomas MD



DATE:  09/03/2018



HISTORY OF PRESENT ILLNESS:  The patient was seen today.  He said his urine

was clear.  He was denying any nausea, vomiting.  No diarrhea.  He is

tolerating Merrem well.  He states Wednesday would be the tenth day of the

Merrem so I think he can go later on in the evening or whatever the primary

decides.



PHYSICAL EXAMINATION:

GENERAL:  He remains afebrile.

VITAL SIGNS:  T-max is 97.9, pulse 86, blood pressure is 103/72,

respirations are 18.

HEENT:  Head is atraumatic and normocephalic.

NECK:  Supple.

LUNGS:  Clear.

HEART:  S1 and S2 regular.

ABDOMEN:  Soft and nontender.  No guarding, no rigidity present.

EXTREMITIES:  Ankle edema has gone now.



Labs show _____ 7.4 today, hemoglobin 12.3, hematocrit 37.2, and platelet

count is 300.  BUN is 21, creatinine 1.2, and he has improved, and there is

no gross hematuria that is noted.  He came with complicated UTI with left

hydronephrosis and has a stent now, and he needs to follow up with Dr. Oakes about future plans, as he still has some partial stones left in the

kidney, as I read from the records.





__________________________________________

Artur Lomas MD



DD:  09/03/2018 19:42:56

DT:  09/03/2018 23:20:52

Job # [id number]

## 2018-09-04 NOTE — PCM.URO
Urology Progress Note





- General


General: No Complaints, Tolerating Diet





- Subjective


Abdominal Pain: No


Flank Pain: No


Nausea: No


Vomiting: No


Voiding Well: Yes


Dysuria: No


Hematuria: No


Good Stream: Yes


Dsypnea: No


Chest Pain: No


Fever & Chills: No





- Objective


Lab Studies: Reviewed (Hct=37 KUB - LLP enal stone)


Lab Results Last 24 Hours: 


 Laboratory Results - last 24 hr











  09/03/18 09/03/18 09/03/18





  12:48 13:47 13:47


 


WBC    7.4


 


RBC    4.46


 


Hgb    12.3


 


Hct    37.2


 


MCV    83.3


 


MCH    27.6


 


MCHC    33.1


 


RDW    20.2 H


 


Plt Count    300


 


MPV    8.6


 


Neut % (Auto)    70.9


 


Lymph % (Auto)    16.7 L


 


Mono % (Auto)    9.6


 


Eos % (Auto)    2.1


 


Baso % (Auto)    0.7


 


Neut # (Auto)    5.3


 


Lymph # (Auto)    1.2


 


Mono # (Auto)    0.7


 


Eos # (Auto)    0.2


 


Baso # (Auto)    0.1


 


Sodium   140 


 


Potassium   3.9 


 


Chloride   97 L 


 


Carbon Dioxide   32 H 


 


Anion Gap   14 


 


BUN   21 H 


 


Creatinine   1.2 


 


Est GFR ( Amer)   > 60 


 


Est GFR (Non-Af Amer)   > 60 


 


Random Glucose   127 H 


 


Calcium   8.9 


 


Total Bilirubin   0.6 


 


AST   23 


 


ALT   19 L 


 


Alkaline Phosphatase   71 


 


Total Protein   7.7 


 


Albumin   4.1 


 


Globulin   3.6 


 


Albumin/Globulin Ratio   1.1 


 


Urine Color  Straw  


 


Urine Clarity  Clear  


 


Urine pH  7.0  


 


Ur Specific Gravity  1.005  


 


Urine Protein  Negative  


 


Urine Glucose (UA)  Normal  


 


Urine Ketones  Negative  


 


Urine Blood  3+ H  


 


Urine Nitrate  Negative  


 


Urine Bilirubin  Negative  


 


Urine Urobilinogen  Normal  


 


Ur Leukocyte Esterase  3+ H  


 


Urine WBC (Auto)  28 H  


 


Urine RBC (Auto)  152 H  


 


Ur Squamous Epith Cells  < 1  


 


Urine Bacteria  Rare  











Intake & Output: 


 Intake & Output











 09/03/18 09/04/18 09/04/18





 18:59 06:59 18:59


 


Intake Total 200  


 


Output Total 1575  


 


Balance -1375  


 


Intake:   


 


  Intake, IV Amount 200  


 


    Left Antecubital 200  


 


Output:   


 


  Urine 1575  


 


    Urine, Voided 1575  











Vital Signs: 


 Vital Signs - 24 hr











  09/03/18 09/03/18 09/04/18





  15:00 23:05 08:11


 


Temperature 97.9 F 97.4 F L 98 F


 


Pulse Rate 88 88 87


 


Respiratory 20 20 20





Rate   


 


Blood Pressure 96/66 L 107/68 107/67


 


O2 Sat by Pulse 97 97 97





Oximetry   














  09/04/18





  09:47


 


Temperature 


 


Pulse Rate 


 


Respiratory 





Rate 


 


Blood Pressure 107/62


 


O2 Sat by Pulse 





Oximetry 














- Physical Exam


Abdominal Exam: Soft, Non-Tender, Non-Distended


Back: No CVA Tenderness





- Plan


Intake & Output: Yes


See Orders: Yes


Additional Information: IMP:  urolithasis.  CAD.  Improved re hematuria.  P: CT 

Scan.  complete antibiotics.  Futher rx t/f.  stent in place.  Urine culture.  

Discussed w pt miracle attending physician





- Date & Time of Note


Date: 09/04/18


Time: 10:07

## 2018-09-04 NOTE — CT
Date of service: 



09/04/2018



PROCEDURE:  CT Abdomen and Pelvis without intravenous contrast



HISTORY:

urolithiasis



COMPARISON:

Noncontrast abdomen and pelvis CT examination 09/19/2016.



TECHNIQUE:

Helical CT of the abdomen and pelvis was performed without oral or 

intravenous contrast as per referring physician request. 



Contrast dose: None



Radiation dose:



Total exam DLP = 588.86 mGy-cm.



This CT exam was performed using one or more of the following dose 

reduction techniques: Automated exposure control, adjustment of the 

mA and/or kV according to patient size, and/or use of iterative 

reconstruction technique.



FINDINGS:



LOWER THORAX:

Unremarkable. 



LIVER:

Unremarkable. No gross lesion or ductal dilatation.  



GALLBLADDER AND BILE DUCTS:

Unremarkable. 



PANCREAS:

Unremarkable. No gross lesion or ductal dilatation.



SPLEEN:

Unremarkable. 



ADRENALS:

Unremarkable. No mass. 



KIDNEYS AND URETERS:

Heterogeneous density at the bilateral kidneys reflects bilateral 

renal cysts once again. No definitive obstructive uropathy is 

appreciated in the interval. Left double-J ureteral stent is 

identified originating at the inferior left renal pelvis and 

terminating inside the left urinary bladder which is decompressed 

completely. A few punctate intrarenal calculi identified number at 

least 3-4 number at the mid to lower pole right renal calices, 

nonobstructive. Multiple intrarenal calculi identified at the mid and 

lower pole left kidney calices with the largest identified measuring 

12 mm versus an aggregate of calculi. 



VASCULATURE:

Unremarkable. No aortic aneurysm. 



BOWEL:

Unremarkable. No obstruction. No gross mural thickening. 



APPENDIX:

Unremarkable. Normal appendix. 



PERITONEUM:

No ascites or free air. 2 tiny supraumbilical are identified less 

than 1 cm in size at the necks containing only small case.  An 

additional small left paracentral umbilical hernia measures 1.6 cm at 

the neck, also supraumbilical. 



LYMPH NODES:

Unremarkable. No enlarged lymph nodes. 



BLADDER:

As above in kidneys and ureter section. 



REPRODUCTIVE:

Enlarged prostate gland reiterated. 



BONES:

No acute fracture. 



OTHER FINDINGS:

None.



IMPRESSION:

No definitive interval obstructive uropathy with left double-J 

ureteral stent remaining in position or replaced. Bilateral cysts 

reiterated. Several intrarenal calculi are again seen bilaterally, 

greater the left and right sides.



Enlarged prostate gland reiterated.

## 2018-09-05 VITALS — OXYGEN SATURATION: 96 % | TEMPERATURE: 98.5 F

## 2018-09-05 VITALS — HEART RATE: 96 BPM | SYSTOLIC BLOOD PRESSURE: 108 MMHG | DIASTOLIC BLOOD PRESSURE: 62 MMHG

## 2018-09-05 LAB
STONE COMPONENT 2: (no result)
WT STONE: 0 G

## 2018-09-05 RX ADMIN — NITROGLYCERIN SCH PATCH: 0.6 PATCH TRANSDERMAL at 09:23

## 2018-09-05 NOTE — PN
Copied To:  Artur Lomas MD

Attending MD:  Artur Lomas MD



DATE:  09/04/2018



SUBJECTIVE:  The patient remains afebrile.  He said he has no more pain. 

He was having clear urine.  I told him he has a stent and he should follow

up with Dr. Oakes and will be later seen by him.  He denies any nausea or

vomiting.  He is voiding well.  No dysuria.  No hematuria.  No shortness of

breath.  No chills.  He will be done with his antibiotics tomorrow and

probably can go home.  He has a stent in place and will be following with

the urologist.



PHYSICAL EXAMINATION:

VITAL SIGNS:  T-max is 98, pulse is 77, blood pressure 110/69, respirations

are 20.

HEENT:  Head is atraumatic, normocephalic.

NECK:  Supple.

LUNGS:  Clear.  No crackles or rales present.

HEART:  S1, S2 are regular.

ABDOMEN:  Soft.  No CVA tenderness present.

EXTREMITIES:  Have no edema.



LABORATORY DATA:  Labs are better.



ASSESSMENT AND PLAN:  We will follow.  He will be done with Merrem

tomorrow, and there is no oral antibiotic to send him home on.  To continue

the medications as they are continued and tomorrow will be the 10th day of

the antibiotics.





__________________________________________

Artur Lomas MD





DD:  09/04/2018 21:49:32

DT:  09/04/2018 21:50:56

Job # [id number]

## 2018-09-05 NOTE — PCM.URO
Urology Progress Note





- General


General: No Complaints, Tolerating Diet





- Subjective


Abdominal Pain: No


Flank Pain: No


Nausea: No


Vomiting: No


Voiding Well: Yes


Dysuria: No


Hematuria: No


Good Stream: Yes


Stone Passed: No


Chest Pain: No


Fever & Chills: No


Other: Feeling well





- Objective


Lab Studies: Reviewed


Intake & Output: 


 Intake & Output











 09/04/18 09/05/18 09/05/18





 18:59 06:59 18:59


 


Intake Total 800 2450 


 


Output Total 1800 1850 


 


Balance -1000 600 


 


Intake:   


 


  Intake, IV Amount 200 200 


 


    Left Antecubital 200 200 


 


  Oral 600 2250 


 


Output:   


 


  Urine 1800 1850 


 


    Urine, Voided 1800 1850 


 


Other:   


 


  # Voids   


 


    Urine, Voided 5  


 


  # Bowel Movements  1 











Vital Signs: 


 Vital Signs - 24 hr











  09/04/18 09/04/18 09/04/18





  16:00 18:00 18:51


 


Temperature 98 F  


 


Pulse Rate 81 95 H 77


 


Respiratory 20  





Rate   


 


Blood Pressure 91/56 L  110/69


 


O2 Sat by Pulse 98  





Oximetry   














  09/04/18 09/04/18 09/05/18





  23:07 23:31 07:00


 


Temperature 98.2 F  98.5 F


 


Pulse Rate 77 81 72


 


Respiratory 20  20





Rate   


 


Blood Pressure 107/67  101/60


 


O2 Sat by Pulse 95  96





Oximetry   














  09/05/18 09/05/18 09/05/18





  07:54 09:23 09:24


 


Temperature   


 


Pulse Rate 84  96 H


 


Respiratory   





Rate   


 


Blood Pressure  108/62 108/62


 


O2 Sat by Pulse   





Oximetry   














- Physical Exam


Abdominal Exam: Soft, Non-Tender, Non-Distended


Back: No CVA Tenderness


Extremities: Normal: Bilateral





- Plan


Additional Information: IMP: doing well.  P:  Further stone treatment t/f.  

Discussed w pt and family





- Date & Time of Note


Date: 09/05/18


Time: 12:45

## 2018-09-05 NOTE — CP.PCM.DIS
Provider





- Provider


Date of Admission: 


08/25/18 15:58





Attending physician: 


Kiley Burnham MD





Time Spent in preparation of Discharge (in minutes): 70





Diagnosis





- Discharge Diagnosis


(1) Hematuria


Status: Resolved   





(2) UTI (urinary tract infection)


Status: Resolved   





(3) Anemia


Status: Chronic   





(4) CAD (coronary artery disease)


Status: Chronic   





(5) Coronary artery disease


Status: Chronic   Priority: High   





(6) Hypertension


Status: Chronic   





(7) Congestive heart failure (CHF)


Status: Chronic   





(8) Pre-diabetes


Status: Chronic   





(9) Genitourinary bleeding


Status: Chronic   





(10) Chest pain due to CAD


Status: Resolved   





Hospital Course





- Lab Results


Lab Results: 


 Micro Results





08/31/18 06:00   Urine,Clean Catch   Urine Culture - Final


                            No Growth (<1,000 CFU/ML)


08/30/18 14:14   Urine   Urine Culture - Final


                            No Growth (<1,000 CFU/ML)


08/29/18 19:17   Urine,Clean Catch   Urine Culture - Final


                            No Growth (<1,000 CFU/ML)


08/23/18 22:46   Blood-Venous   Blood Culture - Final


                            NO GROWTH AFTER 5 DAYS


08/23/18 22:46   Blood-Venous   Gram Stain - Final


                            TEST NOT PERFORMED


08/23/18 21:35   Blood-Venous   Blood Culture - Final


                            NO GROWTH AFTER 5 DAYS


08/23/18 21:35   Blood-Venous   Gram Stain - Final


                            TEST NOT PERFORMED


08/23/18 17:09   Urine   Urine Culture - Final


                            Proteus Mirabilis





 Most Recent Lab Values











WBC  7.4 K/uL (4.8-10.8)   09/03/18  13:47    


 


RBC  4.46 Mil/uL (4.40-5.90)   09/03/18  13:47    


 


Hgb  12.3 g/dL (12.0-18.0)   09/03/18  13:47    


 


Hct  37.2 % (35.0-51.0)   09/03/18  13:47    


 


MCV  83.3 fL (80.0-94.0)   09/03/18  13:47    


 


MCH  27.6 pg (27.0-31.0)   09/03/18  13:47    


 


MCHC  33.1 g/dL (33.0-37.0)   09/03/18  13:47    


 


RDW  20.2 % (11.5-14.5)  H  09/03/18  13:47    


 


Plt Count  300 K/uL (130-400)   09/03/18  13:47    


 


MPV  8.6 fL (7.2-11.7)   09/03/18  13:47    


 


Neut % (Auto)  70.9 % (50.0-75.0)   09/03/18  13:47    


 


Lymph % (Auto)  16.7 % (20.0-40.0)  L  09/03/18  13:47    


 


Mono % (Auto)  9.6 % (0.0-10.0)   09/03/18  13:47    


 


Eos % (Auto)  2.1 % (0.0-4.0)   09/03/18  13:47    


 


Baso % (Auto)  0.7 % (0.0-2.0)   09/03/18  13:47    


 


Neut # (Auto)  5.3 K/uL (1.8-7.0)   09/03/18  13:47    


 


Lymph # (Auto)  1.2 K/uL (1.0-4.3)   09/03/18  13:47    


 


Mono # (Auto)  0.7 K/uL (0.0-0.8)   09/03/18  13:47    


 


Eos # (Auto)  0.2 K/uL (0.0-0.7)   09/03/18  13:47    


 


Baso # (Auto)  0.1 K/uL (0.0-0.2)   09/03/18  13:47    


 


ESR  50 mm/hr (0-15)  H  08/27/18  07:11    


 


Sodium  140 mmol/L (132-148)   09/03/18  13:47    


 


Potassium  3.9 mmol/L (3.6-5.2)   09/03/18  13:47    


 


Chloride  97 mmol/L ()  L  09/03/18  13:47    


 


Carbon Dioxide  32 mmol/L (22-30)  H  09/03/18  13:47    


 


Anion Gap  14  (10-20)   09/03/18  13:47    


 


BUN  21 mg/dL (9-20)  H  09/03/18  13:47    


 


Creatinine  1.2 mg/dL (0.8-1.5)   09/03/18  13:47    


 


Est GFR ( Amer)  > 60   09/03/18  13:47    


 


Est GFR (Non-Af Amer)  > 60   09/03/18  13:47    


 


POC Glucose (mg/dL)  150 mg/dL ()  H  08/24/18  19:24    


 


Random Glucose  127 mg/dL ()  H  09/03/18  13:47    


 


Calcium  8.9 mg/dl (8.6-10.4)   09/03/18  13:47    


 


Magnesium  1.9 mg/dL (1.6-2.3)   08/31/18  07:18    


 


Total Bilirubin  0.6 mg/dL (0.2-1.3)   09/03/18  13:47    


 


AST  23 U/L (17-59)   09/03/18  13:47    


 


ALT  19 U/L (21-72)  L  09/03/18  13:47    


 


Alkaline Phosphatase  71 U/L ()   09/03/18  13:47    


 


Total Creatine Kinase  69 U/L ()   09/01/18  14:50    


 


CK-MB (Mass)  0.46 ng/mL (0.0-3.38)   09/01/18  14:50    


 


Troponin I  < 0.0120 ng/mL (0.00-0.120)   09/01/18  14:50    


 


Total Protein  7.7 g/dL (6.3-8.3)   09/03/18  13:47    


 


Albumin  4.1 g/dL (3.5-5.0)   09/03/18  13:47    


 


Globulin  3.6 gm/dL (2.2-3.9)   09/03/18  13:47    


 


Albumin/Globulin Ratio  1.1  (1.0-2.1)   09/03/18  13:47    


 


Lipase  190 U/L ()   08/23/18  18:07    


 


Urine Color  Straw  (YELLOW)   09/03/18  12:48    


 


Urine Clarity  Clear  (Clear)   09/03/18  12:48    


 


Urine pH  7.0  (5.0-8.0)   09/03/18  12:48    


 


Ur Specific Gravity  1.005  (1.003-1.030)   09/03/18  12:48    


 


Urine Protein  Negative mg/dL (NEGATIVE)   09/03/18  12:48    


 


Urine Glucose (UA)  Normal mg/dL (Normal)   09/03/18  12:48    


 


Urine Ketones  Negative mg/dL (NEGATIVE)   09/03/18  12:48    


 


Urine Blood  3+  (NEGATIVE)  H  09/03/18  12:48    


 


Urine Nitrate  Negative  (NEGATIVE)   09/03/18  12:48    


 


Urine Bilirubin  Negative  (NEGATIVE)   09/03/18  12:48    


 


Urine Urobilinogen  Normal mg/dL (0.2-1.0)   09/03/18  12:48    


 


Ur Leukocyte Esterase  3+ Ray/uL (Negative)  H  09/03/18  12:48    


 


Urine WBC (Auto)  28 /hpf (0-5)  H  09/03/18  12:48    


 


Urine RBC (Auto)  152 /hpf (0-3)  H  09/03/18  12:48    


 


Urine WBC Clumps (Auto)  Few /hpf (NONE)  H  08/23/18  17:07    


 


Ur Squamous Epith Cells  < 1 /hpf (0-5)   09/03/18  12:48    


 


Urine Bacteria  Rare  (<OCC)   09/03/18  12:48    


 


Stone Source  Left renal   08/27/18  14:45    


 


Stone Weight  0.0510 g  08/27/18  14:45    


 


Stone Composition  See below   08/27/18  14:45    


 


Stone Composition 2  TNP   08/27/18  14:45    


 


Stone Nidus  Not observed   08/27/18  14:45    


 


Blood Type  A POSITIVE   08/30/18  11:49    


 


Antibody Screen  Negative   08/30/18  11:49    














- Hospital Course


Hospital Course: 





                                 Admitted this 67 years old  male for 

lithotripsy and hemturia with acute UTI. He was previously admitted for 





        lithotripsy however becausr of a massive prostatic hypetophy this was 

not possible, so he was readmitted for the procedure.








       In the hospital the urine culture showed a growth of Proteus Mirabilis 

sensitive  to Meropenem. Patient was therefore








       started on Meropenem by IVP. and given for 10 days because of the 

procedure done.Patient in the meantime patient was given Lovenox 80 








      mgm SubQ every 12 hours. He continuously had hematuria with this regimen. 

He was given IV ferlecet daily and was given 2 units








     of PRBC. On discharge his Hgb was 12. Urine was re-cultured and was 

negative. He had an  episode of chest pains 2 days prior to 








     discharge .ECG shows no acute change. Troponin was negative. Given Pepcid 

PO and pains were relieved. On discharge the hematuria








    had subsided and patient was re-started on Brilimnta PO. Dr. Oakes saw 

the patient before discharge and he is to see the patient in his office. 








    The patient was given instruction to see me in 1 weeks time. He is to 

continue his medications at home. He is to stop the Pepcid and Isosorbide  








    patch.





- Date & Time of H&P


Date of H&P: 09/05/18


Time of H&P: 10:59





Discharge Exam





- Head Exam


Head Exam: ATRAUMATIC, NORMAL INSPECTION, NORMOCEPHALIC





- Eye Exam


Eye Exam: Normal appearance


Pupil Exam: PERRL





- ENT Exam


ENT Exam: Mucous Membranes Moist, Normal Exam





- Neck Exam


Neck exam: Full Rom





- Respiratory Exam


Respiratory Exam: Clear to PA & Lateral, NORMAL BREATHING PATTERN, UNREMARKABLE





- Cardiovascular Exam


Cardiovascular Exam: REGULAR RHYTHM, +S1, +S2





- GI/Abdominal Exam


GI & Abdominal Exam: Normal Bowel Sounds, Soft





- Rectal Exam


Rectal Exam: Deferred





- Back Exam


Back exam: FULL ROM, NORMAL INSPECTION





- Neurological Exam


Neurological exam: Alert, Normal Gait, Oriented x3, Reflexes Normal





- Skin


Skin Exam: Abrasion, Dry, Intact, Normal Color





Discharge Plan





- Follow Up Plan


Condition: GOOD


Disposition: HOME/ ROUTINE


Patient education suggested?: No


Instructions:  High Blood Pressure (DC), Blood in the Urine (Hematuria), Adult (

DC), Coronary Heart Disease (DC), Urinary Tract Infection in Men (DC)


Referrals: 


Kiley Burnham MD [Staff Provider] - 


Jules Spivey MD [Staff Provider] - 


Glory Oakes MD [Staff Provider] - 


Artur Lomas MD [Staff Provider] - 





Clinical Quality Measures





- CQM - Stroke


Antithrombotic Prescribed: Yes


Statin prescribed: Yes





- CQM - VTE


Did patient receive overlap therapy during hosptialization?: No

## 2018-09-05 NOTE — CARD
--------------- APPROVED REPORT --------------





Date of service: 09/03/2018



EKG Measurement

Heart [id number]

NV [id number]

[id number]

[id number]

[id number]



<Conclusion>

Sinus rhythm with occasional premature ventricular complexes

Minimal voltage criteria for LVH, may be normal variant

Nonspecific T wave abnormality

Abnormal ECG

## 2018-11-19 ENCOUNTER — HOSPITAL ENCOUNTER (INPATIENT)
Dept: HOSPITAL 31 - C.ER | Age: 68
LOS: 8 days | Discharge: HOME | DRG: 660 | End: 2018-11-27
Attending: INTERNAL MEDICINE | Admitting: INTERNAL MEDICINE
Payer: MEDICARE

## 2018-11-19 VITALS — BODY MASS INDEX: 27.4 KG/M2

## 2018-11-19 DIAGNOSIS — Z95.5: ICD-10-CM

## 2018-11-19 DIAGNOSIS — Z87.01: ICD-10-CM

## 2018-11-19 DIAGNOSIS — T45.515A: ICD-10-CM

## 2018-11-19 DIAGNOSIS — I25.5: ICD-10-CM

## 2018-11-19 DIAGNOSIS — Z87.442: ICD-10-CM

## 2018-11-19 DIAGNOSIS — R31.9: ICD-10-CM

## 2018-11-19 DIAGNOSIS — Z88.0: ICD-10-CM

## 2018-11-19 DIAGNOSIS — Z87.891: ICD-10-CM

## 2018-11-19 DIAGNOSIS — R73.03: ICD-10-CM

## 2018-11-19 DIAGNOSIS — N10: ICD-10-CM

## 2018-11-19 DIAGNOSIS — N40.0: ICD-10-CM

## 2018-11-19 DIAGNOSIS — H40.9: ICD-10-CM

## 2018-11-19 DIAGNOSIS — E78.00: ICD-10-CM

## 2018-11-19 DIAGNOSIS — I13.0: ICD-10-CM

## 2018-11-19 DIAGNOSIS — H91.90: ICD-10-CM

## 2018-11-19 DIAGNOSIS — N20.2: Primary | ICD-10-CM

## 2018-11-19 DIAGNOSIS — I48.91: ICD-10-CM

## 2018-11-19 DIAGNOSIS — D50.0: ICD-10-CM

## 2018-11-19 DIAGNOSIS — N18.9: ICD-10-CM

## 2018-11-19 DIAGNOSIS — I50.9: ICD-10-CM

## 2018-11-19 DIAGNOSIS — I25.2: ICD-10-CM

## 2018-11-19 DIAGNOSIS — I25.10: ICD-10-CM

## 2018-11-19 DIAGNOSIS — E78.5: ICD-10-CM

## 2018-11-19 LAB
ALBUMIN SERPL-MCNC: 3.9 [, G/DL] (ref 3.5–5)
ALBUMIN/GLOB SERPL: 1.2 [,] (ref 1–2.1)
ALT SERPL-CCNC: 13 [, U/L] (ref 21–72)
ANISOCYTOSIS BLD QL SMEAR: SLIGHT [,]
AST SERPL-CCNC: 52 [, U/L] (ref 17–59)
BACTERIA #/AREA URNS HPF: (no result) [,]
BASOPHILS # BLD AUTO: 0 [, K/UL] (ref 0–0.2)
BASOPHILS NFR BLD: 0.2 [, %] (ref 0–2)
BILIRUB UR-MCNC: NEGATIVE [,]
BUN SERPL-MCNC: 21 [, MG/DL] (ref 9–20)
CALCIUM SERPL-MCNC: 8.8 [, MG/DL] (ref 8.6–10.4)
EOSINOPHIL # BLD AUTO: 0 [, K/UL] (ref 0–0.7)
EOSINOPHIL NFR BLD: 0.4 [, %] (ref 0–4)
EOSINOPHIL NFR BLD: 1 [, %] (ref 0–4)
ERYTHROCYTE [DISTWIDTH] IN BLOOD BY AUTOMATED COUNT: 16.6 [, %] (ref 11.5–14.5)
GFR NON-AFRICAN AMERICAN: > 60 [,]
GLUCOSE UR STRIP-MCNC: NORMAL [, MG/DL]
HGB BLD-MCNC: 11.5 [, G/DL] (ref 12–18)
HYPOCHROMIC: SLIGHT [,]
LEUKOCYTE ESTERASE UR-ACNC: (no result) [, LEU/UL]
LIPASE: 68 [, U/L] (ref 23–300)
LYMPHOCYTE: 7 [, %] (ref 20–40)
LYMPHOCYTES # BLD AUTO: 0.5 [, K/UL] (ref 1–4.3)
LYMPHOCYTES NFR BLD AUTO: 6.6 [, %] (ref 20–40)
MCH RBC QN AUTO: 28.1 [, PG] (ref 27–31)
MCHC RBC AUTO-ENTMCNC: 32.9 [, G/DL] (ref 33–37)
MCV RBC AUTO: 85.4 [, FL] (ref 80–94)
MONOCYTE: 1 [, %] (ref 0–10)
MONOCYTES # BLD: 0.1 [, K/UL] (ref 0–0.8)
MONOCYTES NFR BLD: 1.9 [, %] (ref 0–10)
NEUTROPHILS # BLD: 6.2 [, K/UL] (ref 1.8–7)
NEUTROPHILS NFR BLD AUTO: 90.9 [, %] (ref 50–75)
NEUTROPHILS NFR BLD AUTO: 91 [, %] (ref 50–75)
NRBC BLD AUTO-RTO: 0 [, %] (ref 0–2)
OVALOCYTES BLD QL SMEAR: SLIGHT [,]
PH UR STRIP: 7 [,] (ref 5–8)
PLATELET # BLD EST: NORMAL [,]
PLATELET # BLD: 158 [, K/UL] (ref 130–400)
PMV BLD AUTO: 8.6 [, FL] (ref 7.2–11.7)
POLYCHROMIC: SLIGHT [,]
PROT UR STRIP-MCNC: (no result) [, MG/DL]
RBC # BLD AUTO: 4.09 [, MIL/UL] (ref 4.4–5.9)
RBC # UR STRIP: (no result) [,]
SP GR UR STRIP: 1.01 [,] (ref 1–1.03)
SQUAMOUS EPITHIAL: 3 [, /HPF] (ref 0–5)
TOTAL CELLS COUNTED BLD: 100 [,]
UROBILINOGEN UR-MCNC: NORMAL [, MG/DL] (ref 0.2–1)
WBC # BLD AUTO: 6.9 [, K/UL] (ref 4.8–10.8)
WBC CLUMPS # UR AUTO: (no result) [, /HPF]

## 2018-11-19 NOTE — CT
Date of service: 



11/19/2018



PROCEDURE:  CT Abdomen and Pelvis. 



HISTORY:

Abdominal pain . 



COMPARISON:

Comparison made with prior CT scan abdomen pelvis 09/04/2018. 



TECHNIQUE:

Contiguous axial images of the abdomen and pelvis performed following 

intravenous injection of approximately 100 cc Visipaque 320 contrast 

material.  Additional 2D   sagittal and coronal reformats generated. 



Radiation dose:



Total exam DLP = 1138.49 mGy-cm.



This CT exam was performed using one or more of the following dose 

reduction techniques: Automated exposure control, adjustment of the 

mA and/or kV according to patient size, and/or use of iterative 

reconstruction technique. 



FINDINGS:



LOWER THORAX:

Heart size within range of normal.  No significant pericardial 

effusion..  Tiny hiatal hernia with slight wall thickening of the 

distal esophagus likely due to protrusion of gastric mucosa.  

Possibility of esophagitis not excluded.



Bibasilar atelectasis and or scarring.  No effusion or basilar 

pneumothorax. 



LIVER:

Liver exhibits normal size.  No obvious hepatic mass collection or 

calcification.  Portal and splenic veins are opacified. 



GALLBLADDER AND BILE DUCTS:

Gallbladder physiologically distended.  No evidence of intraluminal 

gallbladder calculi. 



PANCREAS:

The pancreas appears slightly atrophic and fatty replaced.  No 

pancreatic mass collection or calcification.  No significant 

pancreatic ductal dilatation 



SPLEEN:

Spleen exhibits normal size and attenuation pattern without masses 

collections or calcifications. 



ADRENALS:

No adrenal lesions. 



KIDNEYS AND URETERS:

There are numerous bilateral renal cysts present the largest on the 

right side arising from the lower pole on and predominately exophytic 

measures approximately 5.5 cm.  Two large partially exophytic cysts 

cysts arising from the upper/midpole left kidney measure 

approximately 4.7 and 3.8 cm in greatest dimension from anterior to 

posterior respectively...  A. There are numerous much smaller of 

bilateral the visualized renal parenchyma otherwise exhibits 

relatively symmetric nephrograms.  Several bilateral renal calculi 

again also again seen.  Also noted is an in situ left ureteral stent. 



BLADDER:

Urinary bladder is incompletely distended which in part accounts for 

thick-walled appearance however muscular hypertrophy presumably 

contributes.  Possibility of cystitis or other intrinsic/invasive 

wall lesion not excluded.  Distal coil of a left ureteral stent is 

seen within the left inferior margin of the urinary bladder 



REPRODUCTIVE:

Prostate gland remains enlarged measuring approximately 5.4 cm in 

transverse dimension.  Findings likely due to BPH however correlation 

with PSA recommended. 



APPENDIX:

Appearing appendix..



BOWEL:

Evaluation of the bowel is limited due to the lack of oral contrast 

material.



The stomach is incompletely distended which in part accounts for 

thick-walled appearance.  Prominent rugal fold pattern is present 

likely due to collapse however the possibility of a gastritis not 

excluded.



Visualized loops of small bowel exhibit normal contour and caliber.  

No evidence of acute mechanical small bowel obstruction.  As 

mentioned above, there is a knuckle of nonobstructed small bowel 

within a paraumbilical hernia.  



PERITONEUM:

Unremarkable. No fluid collection. No free air.  There is a ventral 

wall hernia extending just at and just above the level of the 

umbilicus that contains fat as well as unobstructed loops of small 

bowel. 



LYMPH NODES:

Unremarkable. No enlarged lymph nodes. 



VASCULATURE:

Unremarkable. No aortic aneurysm. Mild aortic atherosclerotic and 

iliac artery calcification or mural plaque present.



BONES:

Mild multilevel degenerative spondylosis of the lower thoracic and 

lumbar spine. 



OTHER FINDINGS:

None. 



IMPRESSION:

Redemonstrated are bilateral renal calculi with in situ left ureteral 

stent.  Numerous bilateral renal cysts are also again seen. 



There is a small paraumbilical hernia that contains mesenteric fat 

and unobstructed knuckle of small bowel small hiatal hernia with 

slight wall thickening of the distal esophagus likely due to 

protrusion gastric mucosa.  The stomach is collapsed which in part 

accounts for thick-walled appearance however rule out gastritis 



Enlarged prostate gland likely due to BPH however correlation with 

PSA recommended.

## 2018-11-19 NOTE — CP.PCM.HP
History of Present Illness





- History of Present Illness


History of Present Illness: 





67 years old male, a patient of Dr Burnham, for whom I am covering, came to 

the ED complaining of a lower abdominal pain, fever, chills, nausea, vomiting 

since this AM. Patient denies any burning micturation, any bloody urine. Patient

is known to have an ischemic cardiomyopathy,  s/p multiple PCI's of the 

circumflex artery, a hypertension, a hypercholesterolemia, a BPH, kidney stones 

with multiple cystoscopies, laser lithotrypsies, insertion of a left ureteral 

stent. He quit cigarette smoking and alcohol abuse many years ago. In the ED his

U/A revealed 2+ protein  2+ blood   3+ leukocytes esterase and positive 

nitrates. He was given one dose of Rocephin in the ED for a pyelonephritis.





Present on Admission





- Present on Admission


Any Indicators Present on Admission: No





Review of Systems





- Constitutional


Constitutional: Chills, Fever





- Gastrointestinal


Gastrointestinal: Abdominal Pain, Nausea, Vomiting





- Genitourinary


Genitourinary: Urinary Urgency





Past Patient History





- Infectious Disease


Hx of Infectious Diseases: None





- Tetanus Immunizations


Tetanus Immunization: Unknown





- Past Medical History & Family History


Past Medical History?: Yes





- Past Social History


Smoking Status: Former Smoker


Alcohol: None


Drugs: Denies


Home Situation {Lives}: With Family


Domestic Violence: Negative





- CARDIAC


Hx Atrial Fibrillation: Yes


Hx Cardia Arrhythmia: Yes (Ventricular fibrillations x 2 in the past with acute 

MI's.)


Hx Congestive Heart Failure: Yes


Hx Hypercholesterolemia: Yes


Hx Hypertension: Yes


Hx Peripheral Edema: Yes (not at present)





- PULMONARY


Hx Pneumonia: Yes





- NEUROLOGICAL


Hx Neurological Disorder: No





- HEENT


Hx HEENT Problems: Yes


Hx Deafness: Yes (HEARING AIDS)


Hx Glaucoma: Yes





- RENAL


Hx Chronic Kidney Disease: Yes


Hx Kidney Stones: Yes





- ENDOCRINE/METABOLIC


Hx Endocrine Disorders: Yes (Pre diabetes/no medication)


Hx Diabetes Mellitus Type 2: Yes


Other/Comment: "borderline diabetes"





- HEMATOLOGICAL/ONCOLOGICAL


Hx Anemia: Yes (sec to blood loss.)





- INTEGUMENTARY


Hx Dermatological Problems: No





- MUSCULOSKELETAL/RHEUMATOLOGICAL


Hx Musculoskeletal Disorders: Yes


Hx Back Pain: Yes


Hx Falls: No


Hx Herniated Disk: Yes ("LOWER BACK")





- GASTROINTESTINAL


Hx Crohn's Disease: No


Hx Diverticulitis: No


Hx Gall Bladder Disease: No


Hx Gastritis: Yes


Hx Pancreatitis: No





- GENITOURINARY/GYNECOLOGICAL


Hx Genitourinary Disorders: Yes


Hx Bladder Stone: Yes (Obstructing left ureteral stone.)


Hx Hematuria: Yes


Hx Prostate Problems: Yes


Other/Comment: enlarged prostate





- PSYCHIATRIC


Hx Substance Use: No





- SURGICAL HISTORY


Hx Coronary Stent: Yes (X4)





- ANESTHESIA


Hx Anesthesia: Yes


Hx Anesthesia Reactions: No (? HAD MI 2013 AFTER PROSTATE SURGERY-PT ALSO HAD 

BLEEDING)





Meds


Allergies/Adverse Reactions: 


                                    Allergies











Allergy/AdvReac Type Severity Reaction Status Date / Time


 


Penicillins Allergy Unknown HAPPENED Verified 08/23/18 14:58





   AS A  





   CHILD-UNKNOWN  





   REACTION  














Physical Exam





- Constitutional


Appears: Well, No Acute Distress





- Head Exam


Head Exam: NORMAL INSPECTION





- Eye Exam


Eye Exam: Normal appearance


Pupil Exam: NORMAL ACCOMODATION





- ENT Exam


ENT Exam: Normal Exam





- Neck Exam


Neck exam: Positive for: Normal Inspection





- Respiratory Exam


Respiratory Exam: Clear to Auscultation Bilateral, NORMAL BREATHING PATTERN





- Cardiovascular Exam


Cardiovascular Exam: REGULAR RHYTHM, Systolic Murmur





- GI/Abdominal Exam


GI & Abdominal Exam: Normal Bowel Sounds, Soft





- Rectal Exam


Rectal Exam: Deferred





-  Exam


 Exam: NORMAL INSPECTION





- Extremities Exam


Extremities exam: Positive for: normal inspection





- Back Exam


Back exam: NORMAL INSPECTION





- Neurological Exam


Neurological exam: Alert, Normal Gait, Oriented x3





- Psychiatric Exam


Psychiatric exam: Anxious





- Skin


Skin Exam: Dry, Intact, Normal Color





Results





- Vital Signs


Recent Vital Signs: 





                                Last Vital Signs











Temp  97.9 F   11/19/18 20:08


 


Pulse  86   11/19/18 20:08


 


Resp  20   11/19/18 20:08


 


BP  143/74   11/19/18 20:08


 


Pulse Ox  99   11/19/18 20:08














- Labs


Result Diagrams: 


                                 11/19/18 10:46





                                 11/19/18 10:46


Labs: 





                         Laboratory Results - last 24 hr











  11/19/18 11/19/18 11/19/18





  10:46 10:46 10:58


 


WBC  6.9  


 


RBC  4.09 L  


 


Hgb  11.5 L  


 


Hct  35.0  


 


MCV  85.4  D  


 


MCH  28.1  


 


MCHC  32.9 L  


 


RDW  16.6 H  


 


Plt Count  158  D  


 


MPV  8.6  


 


Neut % (Auto)  90.9 H  


 


Lymph % (Auto)  6.6 L  


 


Mono % (Auto)  1.9  


 


Eos % (Auto)  0.4  


 


Baso % (Auto)  0.2  


 


Neut # (Auto)  6.2  


 


Lymph # (Auto)  0.5 L  


 


Mono # (Auto)  0.1  


 


Eos # (Auto)  0.0  


 


Baso # (Auto)  0.0  


 


Neutrophils % (Manual)  91 H  


 


Lymphocytes % (Manual)  7 L  


 


Monocytes % (Manual)  1  


 


Eosinophils % (Manual)  1  


 


Platelet Estimate  Normal  


 


Polychromasia  Slight  


 


Hypochromasia (manual)  Slight  


 


Anisocytosis (manual)  Slight  


 


Ovalocytes  Slight  


 


Sodium   137 


 


Potassium   4.9 


 


Chloride   106 


 


Carbon Dioxide   24 


 


Anion Gap   12 


 


BUN   21 H 


 


Creatinine   1.2 


 


Est GFR ( Amer)   > 60 


 


Est GFR (Non-Af Amer)   > 60 


 


Random Glucose   117 H 


 


Calcium   8.8 


 


Total Bilirubin   1.0 


 


AST   52 


 


ALT   13 L D 


 


Alkaline Phosphatase   63 


 


Total Protein   7.2 


 


Albumin   3.9 


 


Globulin   3.2 


 


Albumin/Globulin Ratio   1.2 


 


Lipase   68 


 


Urine Color    Yellow


 


Urine Clarity    Hazy


 


Urine pH    7.0


 


Ur Specific [place]    1.011


 


Urine Protein    2+ H


 


Urine Glucose (UA)    Normal


 


Urine Ketones    Negative


 


Urine Blood    2+ H


 


Urine Nitrate    Positive H


 


Urine Bilirubin    Negative


 


Urine Urobilinogen    Normal


 


Ur Leukocyte Esterase    3+ H


 


Urine WBC (Auto)    246 H


 


Urine RBC (Auto)    49 H


 


Urine WBC Clumps (Auto)    Few H


 


Ur Squamous Epith Cells    3


 


Urine Bacteria    Occ H














Assessment & Plan


(1) Pyelonephritis


Status: Acute   





(2) Calculus of left kidney


Assessment and Plan: 


Urological evaluation.


Status: Acute   





- Assessment and Plan (Free Text)


Assessment: 





On IV antibiotics after urine and blood cultures.

## 2018-11-19 NOTE — C.PDOC
History Of Present Illness


68 y/o male presents to the ER complaining of abdominal pain and vomiting which 

began in the morning today. Patient states that the pain is mainly in the 

periumbilical region and the pain is non-radiating. Patient reports that he sta

rted having chills and felt warm to touch so he decided to visit the ER. He 

notes that he had fried fish for dinner last night, he is unsure if this caused 

the symptoms.Denies having urinary symptoms.


Time Seen by Provider: 11/19/18 09:54


Chief Complaint (Nursing): Abdominal Pain


History Per: Patient


History/Exam Limitations: no limitations


Onset/Duration Of Symptoms: Hrs


Current Symptoms Are (Timing): Still Present


Severity: Moderate


Location Of Pain/Discomfort: Periumbilical


Radiation Of Pain To:: None





Past Medical History


Reviewed: Historical Data, Nursing Documentation, Vital Signs


Vital Signs: 





                                Last Vital Signs











Temp  100.1 F H  11/19/18 09:44


 


Pulse  119 H  11/19/18 09:44


 


Resp  20   11/19/18 09:44


 


BP  132/70   11/19/18 09:44


 


Pulse Ox  97   11/19/18 09:44














- Medical History


PMH: Anemia (sec to blood loss.), Atrial Fibrillation, Benign Prostatic 

Hyperplasia, Cardia Arrhythmia (Ventricular fibrillations x 2 in the past with 

acute MI's.), CHF, Gastritis, HTN, Hypercholesterolemia, Hyperlipidemia, Kidney 

Stones, Peripheral Edema (not at present), Pneumonia, Chronic Kidney Disease


   Denies: Crohn's Disease, Diverticulitis, Gall Bladder Disease, Pancreatitis


Surgical History: Coronary Stent (X4), Endoscopy





- [facility] Procedures











CARDIOPULM RESUSCITA NOS (10/14/13)


CONTINUOUS INVASIVE MECHANICAL VENTILATION <96 CONSEC HRS (10/14/13)


CONTROL POSTOP PROST HEM (10/14/13)


CORONAR ARTERIOGR-2 CATH (10/14/13)


DESTRUCTION OF PROSTATE, ENDO (02/21/16)


DILATION OF BLADDER WITH INTRALUMINAL DEVICE, ENDO (02/21/16)


DILATION OF L KIDNEY PELVIS WITH INTRALUM DEV, PERC APPROACH (02/21/16)


DILATION OF LEFT URETER WITH INTRALUMINAL DEVICE, ENDO (08/25/18)


DIR ING HERNIA REP-GRAFT (07/19/01)


DRAINAGE OF BLADDER WITH DRAINAGE DEVICE, OPEN APPROACH (02/21/16)


DRAINAGE OF BLADDER, ENDO (08/25/18)


ESOPHAGOGASTRODUODENOSCOPY [EGD] W/CLOSED BIOPSY (01/23/02)


EXC LES SOFT TISSUE NEC (07/19/01)


EXCISION OF BLADDER, ENDO (08/06/18)


EXCISION OF PROSTATE, ENDO (08/06/18)


EXCISION OF STOMACH, ENDO, DIAGN (11/09/17)


EXTIRPATION OF MATTER FROM BLADDER, ENDO (02/21/16)


EXTIRPATION OF MATTER FROM LEFT KIDNEY PELVIS, ENDO (08/25/18)


EXTIRPATION OF MATTER FROM LEFT KIDNEY, ENDO (08/25/18)


EXTIRPATION OF MATTER FROM LEFT URETER, ENDO (02/21/16)


FLUOROSCOPY OF LEFT HEART USING LOW OSMOLAR CONTRAST (04/10/16)


FLUOROSCOPY OF LEFT KIDNEY, URETER AND BLADDER (08/25/18)


INSERT ENDOTRACHEAL TUBE (10/14/13)


INSERTION OF ENDOTRACHEAL AIRWAY INTO TRACHEA, VIA OPENING (02/21/16)


INSERTION OF ONE VASCULAR STENT (10/14/13)


INSPECTION OF LARYNX, ENDO (02/21/16)


INSPECTION OF LOWER INTESTINAL TRACT, ENDO (11/09/17)


INSPECTION OF PERITONEAL CAVITY, OPEN APPROACH (02/21/16)


INSPECTION OF TRACHEOBRONCHIAL TREE, ENDO (02/21/16)


INTRODUCTION OF NUTRITIONAL INTO UP GI, VIA OPENING (02/21/16)


INTRODUCTION OF VASOPRESSOR INTO PERIPH VEIN, PERC APPROACH (02/21/16)


LEFT HEART CARDIAC CATH (10/14/13)


LT HEART ANGIOCARDIOGRAM (10/14/13)


MEASURE OF CARDIAC SAMPL & PRESSURE, L HEART, PERC APPROACH (04/10/16)


PACKED CELL TRANSFUSION (10/14/13)


PERCUTANEOUS TRANSLUMINAL CORONARY ANGIOPLASTY [PTCA] (10/14/13)


PROCEDURE ON SINGLE VESSEL (10/14/13)


REPAIR BLADDER, OPEN APPROACH (02/21/16)


RESPIRATORY VENTILATION, GREATER THAN 96 CONSECUTIVE HOURS (02/21/16)


RETROGR CYSTOURETHROGRAM (10/14/13)


TRANSFUSE NONAUT FROZEN PLASMA IN PERIPH VEIN, PERC (02/21/16)


TRANSFUSE NONAUT RED BLOOD CELLS IN PERIPH VEIN, PERC (11/09/17)


TRANSURETH BLADD BIOPSY (10/14/13)


TU DESTRUC BLADD LES NEC (10/14/13)








Family History: States: No Known Family Hx





- Social History


Hx Tobacco Use: No


Hx Alcohol Use: No


Hx Substance Use: No





- Immunization History


Hx Tetanus Toxoid Vaccination: No


Hx Influenza Vaccination: Yes


Hx Pneumococcal Vaccination: Yes





Review Of Systems


Except As Marked, All Systems Reviewed And Found Negative.


Constitutional: Positive for: Fever (subjective fever), Chills


Gastrointestinal: Positive for: Vomiting, Abdominal Pain


Genitourinary: Negative for: Dysuria, Hematuria





Physical Exam





- Physical Exam


Appears: Non-toxic, No Acute Distress


Skin: Normal Color, Warm, Dry


Head: Atraumatic, Normacephalic


Eye(s): bilateral: Normal Inspection


Ear(s): Bilateral: Normal


Nose: Normal


Oral Mucosa: Moist


Throat: Normal, No Erythema, No Exudate


Neck: Supple


Chest: Symmetrical


Cardiovascular: Rhythm Irregular (tachycardiac)


Respiratory: Normal Breath Sounds, No Rales, No Rhonchi, No Wheezing


Gastrointestinal/Abdominal: Soft, Tenderness (periumbilical and LLQ tenderness),

No Guarding, No Rebound


Neurological/Psych: Oriented x3, Normal Speech


Additional Physical Exam Comments: 


patient is febrile





ED Course And Treatment





- Laboratory Results


Result Diagrams: 


                                 11/19/18 10:46





                                 11/19/18 10:46


Lab Interpretation: Abnormal


O2 Sat by Pulse Oximetry: 97 (RA)


Pulse Ox Interpretation: Normal





- CT Scan/US


  ** CT-Abd & Pelv.


Other Rad Studies (CT/US): Read By Radiologist, Radiology Report Reviewed


CT/US Interpretation: Date of service:  11/19/2018.  PROCEDURE:  CT Abdomen and 

Pelvis.  HISTORY:  Abdominal pain .  COMPARISON:  Comparison made with prior CT 

scan abdomen pelvis 09/04/2018.  TECHNIQUE:  Contiguous axial images of the 

abdomen and pelvis performed following intravenous injection of approximately 

100 cc Visipaque 320 contrast material.  Additional 2D   sagittal and coronal 

reformats generated.  Radiation dose:  Total exam DLP = 1138.49 mGy-cm.  This CT

exam was performed using one or more of the following dose reduction techniques:

Automated exposure control, adjustment of the mA and/or kV according to patient 

size, and/or use of iterative reconstruction technique.  FINDINGS:  LOWER 

THORAX:  Heart size within range of normal.  No significant pericardial 

effusion..  Tiny hiatal hernia with slight wall thickening of the distal eso

phagus likely due to protrusion of gastric mucosa.  Possibility of esophagitis 

not excluded.  Bibasilar atelectasis and or scarring.  No effusion or basilar 

pneumothorax.  LIVER:  Liver exhibits normal size.  No obvious hepatic mass 

collection or calcification.  Portal and splenic veins are opacified.  

GALLBLADDER AND BILE DUCTS:  Gallbladder physiologically distended.  No evidence

of intraluminal gallbladder calculi.  PANCREAS:  The pancreas appears slightly 

atrophic and fatty replaced.  No pancreatic mass collection or calcification.  

No significant pancreatic ductal dilatation.  SPLEEN:  Spleen exhibits normal 

size and attenuation pattern without masses collections or calcifications.  ADR

ENALS:  No adrenal lesions.  KIDNEYS AND URETERS:  There are numerous bilateral 

renal cysts present the largest on the right side arising from the lower pole on

and predominately exophytic measures approximately 5.5 cm.  Two large partially 

exophytic cysts cysts arising from the upper/midpole left kidney measure 

approximately 4.7 and 3.8 cm in greatest dimension from anterior to posterior 

respectively...  A. There are numerous much smaller of bilateral the visualized 

renal parenchyma otherwise exhibits relatively symmetric nephrograms.  Several 

bilateral renal calculi again also again seen.  Also noted is an in situ left 

ureteral stent.  BLADDER:  Urinary bladder is incompletely distended which in 

part accounts for thick-walled appearance however muscular hypertrophy 

presumably contributes.  Possibility of cystitis or other intrinsic/invasive 

wall lesion not excluded.  Distal coil of a left ureteral stent is seen within 

the left inferior margin of the urinary bladder.  REPRODUCTIVE:  Prostate gland 

remains enlarged measuring approximately 5.4 cm in transverse dimension.  

Findings likely due to BPH however correlation with PSA recommended.  APPENDIX: 

Appearing appendix..  BOWEL:  Evaluation of the bowel is limited due to the lack

of oral contrast material.  The stomach is incompletely distended which in part 

accounts for thick-walled appearance.  Prominent rugal fold pattern is present 

likely due to collapse however the possibility of a gastritis not excluded.  

Visualized loops of small bowel exhibit normal contour and caliber.  No evidence

of acute mechanical small bowel obstruction.  As mentioned above, there is a 

knuckle of nonobstructed small bowel within a paraumbilical hernia.  PERITONEUM:

 Unremarkable. No fluid collection. No free air.  There is a ventral wall hernia

extending just at and just above the level of the umbilicus that contains fat as

well as unobstructed loops of small bowel.  LYMPH NODES:  Unremarkable. No 

enlarged lymph nodes.  VASCULATURE:  Unremarkable. No aortic aneurysm. Mild 

aortic atherosclerotic and iliac artery calcification or mural plaque present.  

BONES:  Mild multilevel degenerative spondylosis of the lower thoracic and 

lumbar spine.  OTHER FINDINGS:  None.  IMPRESSION:  Redemonstrated are bilateral

renal calculi with in situ left ureteral stent.  Numerous bilateral renal cysts 

are also again seen.  There is a small paraumbilical hernia that contains 

mesenteric fat and unobstructed knuckle of small bowel small hiatal hernia with 

slight wall thickening of the distal esophagus likely due to protrusion gastric 

mucosa.  The stomach is collapsed which in part accounts for thick-walled 

appearance however rule out gastritis.  Enlarged prostate gland likely due to 

BPH however correlation with PSA recommended.





Medical Decision Making


Medical Decision Making: 


Impression: Abdominal Pain, Vomiting





Plan:


* Labs


* UA


* CT-Abd & Pelv.


* IV Fluids


* Toradol IV


* Zofran IV





Labs reviewed. UA shows nitrates, blood, LE. Additional orders placed for IV 

antibiotics and urine culture. CT shows bilateral calculi stent and umbilical 

hernia, see full report. Case reviewed with DR Goldstein who agrees patient would 

benefit from obs admission with IV antibiotics given history of urosepsis. 

Contact DR Spivey covering for Dr Burnham for admission. 





Disposition





- Disposition


Disposition: HOSPITALIZED


Disposition Time: 13:30


Condition: STABLE





- POA


Present On Arrival: None





- Clinical Impression


Clinical Impression: 


 Pyelonephritis








- PA / NP / Resident Statement


MD/DO has reviewed & agrees with the documentation as recorded.





- Scribe Statement


The provider has reviewed the documentation as recorded by the Scribe


Zacarias Pina





Provider Attestation





All medical record entries made by the Scribe were at my direction and pe

rsonally dictated by me. I have reviewed the chart and agree that the record 

accurately reflects my personal performance of the history, physical exam, 

medical decision making, and the department course for this patient. I have also

personally directed, reviewed, and agree with the discharge instructions and 

disposition.





Decision To Admit





- Pt Status Changed To:


Hospital Disposition Of: Observation





- .


Bed Request Type: Regular


Admitting Physician: Jules Spivey


Patient Diagnosis: 


 Pyelonephritis

## 2018-11-20 RX ADMIN — Medication SCH TAB: at 09:55

## 2018-11-20 NOTE — CP.PCM.PN
Subjective





- Date & Time of Evaluation


Date of Evaluation: 11/20/18


Time of Evaluation: 18:22





- Subjective


Subjective: 





Patient has complaint of abdominal pain, SOOB. Afebrile. Urine culture reveals 

Gram negative rods, pending sensitivity. On IV Cefepime.





Objective





- Vital Signs/Intake and Output


Vital Signs (last 24 hours): 


                                        











Temp Pulse Resp BP Pulse Ox


 


 97.9 F   86   20   102/62   96 


 


 11/20/18 15:30  11/20/18 15:30  11/20/18 15:30  11/20/18 15:30  11/20/18 15:30











- Medications


Medications: 


                               Current Medications





Carvedilol (Coreg)  6.25 mg PO BID [facility]


   Last Admin: 11/20/18 09:55 Dose:  6.25 mg


Finasteride (Proscar)  5 mg PO DAILY [facility]


   Last Admin: 11/20/18 09:55 Dose:  5 mg


Furosemide (Lasix)  40 mg PO DAILY [facility]


   Last Admin: 11/20/18 09:55 Dose:  40 mg


Cefepime HCl 1 gm/ Dextrose  50 mls @ 100 mls/hr IVPB Q12H [facility]; Protocol


   Last Admin: 11/20/18 12:40 Dose:  100 mls/hr


Losartan Potassium (Cozaar)  100 mg PO DAILY [facility]


   Last Admin: 11/20/18 09:55 Dose:  100 mg


Multivitamins (Hexavitamin)  1 tab PO DAILY [facility]


   Last Admin: 11/20/18 09:55 Dose:  1 tab


Rosuvastatin Calcium (Crestor)  10 mg PO HS [facility]


   Last Admin: 11/19/18 21:53 Dose:  10 mg


Tamsulosin HCl (Flomax)  0.4 mg PO DAILY [facility]


   Last Admin: 11/20/18 09:55 Dose:  0.4 mg


Ticagrelor (Brilinta)  90 mg PO BID [facility]


   Last Admin: 11/20/18 09:55 Dose:  90 mg











- Labs


Labs: 


                                        





                                 11/19/18 10:46 





                                 11/19/18 10:46 











- Constitutional


Appears: Well, No Acute Distress





- Head Exam


Head Exam: NORMAL INSPECTION





- Eye Exam


Eye Exam: Normal appearance


Pupil Exam: NORMAL ACCOMODATION





- ENT Exam


ENT Exam: Normal Exam





- Neck Exam


Neck Exam: Normal Inspection





- Respiratory Exam


Respiratory Exam: Clear to Ausculation Bilateral, NORMAL BREATHING PATTERN





- Cardiovascular Exam


Cardiovascular Exam: REGULAR RHYTHM, Murmur





- GI/Abdominal Exam


GI & Abdominal Exam: Soft, Normal Bowel Sounds





- Rectal Exam


Rectal Exam: Deferred





- Extremities Exam


Extremities Exam: Normal Inspection





- Back Exam


Back Exam: NORMAL INSPECTION





- Neurological Exam


Neurological Exam: Alert, Awake, Normal Gait, Oriented x3





- Psychiatric Exam


Psychiatric exam: Anxious





- Skin


Skin Exam: Dry, Intact, Normal Color





Assessment and Plan


(1) Pyelonephritis


Assessment & Plan: 


On IV Cefepime.


Status: Acute   





(2) Calculus of left kidney


Assessment & Plan: 


Left ureteral stent patent.


Status: Acute

## 2018-11-20 NOTE — CP.PCM.CON
History of Present Illness





- History of Present Illness


History of Present Illness: 





UROLOGY CONSULTATION   





FULL NOTE TBD





Past Patient History





- Infectious Disease


Hx of Infectious Diseases: None





- Tetanus Immunizations


Tetanus Immunization: Unknown





- Past Medical History & Family History


Past Medical History?: Yes





- Past Social History


Smoking Status: Former Smoker


Alcohol: None


Drugs: Denies


Home Situation {Lives}: With Family


Domestic Violence: Negative





- CARDIAC


Hx Atrial Fibrillation: Yes


Hx Cardia Arrhythmia: Yes (Ventricular fibrillations x 2 in the past with acute 

MI's.)


Hx Congestive Heart Failure: Yes


Hx Hypercholesterolemia: Yes


Hx Hypertension: Yes


Hx Peripheral Edema: Yes (not at present)





- PULMONARY


Hx Pneumonia: Yes





- NEUROLOGICAL


Hx Neurological Disorder: No





- HEENT


Hx HEENT Problems: Yes


Hx Deafness: Yes (HEARING AIDS)


Hx Glaucoma: Yes





- RENAL


Hx Chronic Kidney Disease: Yes


Hx Kidney Stones: Yes





- ENDOCRINE/METABOLIC


Hx Endocrine Disorders: Yes (Pre diabetes/no medication)


Hx Diabetes Mellitus Type 2: Yes


Other/Comment: "borderline diabetes"





- HEMATOLOGICAL/ONCOLOGICAL


Hx Anemia: Yes (sec to blood loss.)





- INTEGUMENTARY


Hx Dermatological Problems: No





- MUSCULOSKELETAL/RHEUMATOLOGICAL


Hx Musculoskeletal Disorders: Yes


Hx Back Pain: Yes


Hx Falls: No


Hx Herniated Disk: Yes ("LOWER BACK")





- GASTROINTESTINAL


Hx Crohn's Disease: No


Hx Diverticulitis: No


Hx Gall Bladder Disease: No


Hx Gastritis: Yes


Hx Pancreatitis: No





- GENITOURINARY/GYNECOLOGICAL


Hx Genitourinary Disorders: Yes


Hx Bladder Stone: Yes (Obstructing left ureteral stone.)


Hx Hematuria: Yes


Hx Prostate Problems: Yes


Other/Comment: enlarged prostate





- PSYCHIATRIC


Hx Substance Use: No





- SURGICAL HISTORY


Hx Coronary Stent: Yes (X4)





- ANESTHESIA


Hx Anesthesia: Yes


Hx Anesthesia Reactions: No (? HAD MI 2013 AFTER PROSTATE SURGERY-PT ALSO HAD 

BLEEDING)





Meds


Allergies/Adverse Reactions: 


                                    Allergies











Allergy/AdvReac Type Severity Reaction Status Date / Time


 


Penicillins Allergy Unknown HAPPENED Verified 08/23/18 14:58





   AS A  





   CHILD-UNKNOWN  





   REACTION  














- Medications


Medications: 


                               Current Medications





Carvedilol (Coreg)  6.25 mg PO BID [facility]


   Last Admin: 11/19/18 20:10 Dose:  6.25 mg


Finasteride (Proscar)  5 mg PO DAILY [facility]


Furosemide (Lasix)  40 mg PO DAILY [facility]


Cefepime HCl 1 gm/ Dextrose  50 mls @ 100 mls/hr IVPB Q12H [facility]; Protocol


   Last Admin: 11/20/18 00:17 Dose:  100 mls/hr


Losartan Potassium (Cozaar)  100 mg PO DAILY [facility]


Multivitamins (Hexavitamin)  1 tab PO DAILY [facility]


Rosuvastatin Calcium (Crestor)  10 mg PO HS [facility]


   Last Admin: 11/19/18 21:53 Dose:  10 mg


Tamsulosin HCl (Flomax)  0.4 mg PO DAILY [facility]


Ticagrelor (Brilinta)  90 mg PO BID [facility]


   Last Admin: 11/19/18 20:10 Dose:  90 mg











Results





- Vital Signs


Recent Vital Signs: 


                                Last Vital Signs











Temp  97.9 F   11/20/18 07:56


 


Pulse  75   11/20/18 07:56


 


Resp  20   11/20/18 07:56


 


BP  137/72   11/20/18 07:56


 


Pulse Ox  98   11/20/18 08:27














- Labs


Result Diagrams: 


                                 11/19/18 10:46





                                 11/19/18 10:46


Labs: 


                         Laboratory Results - last 24 hr











  11/19/18 11/19/18 11/19/18





  10:46 10:46 10:58


 


WBC  6.9  


 


RBC  4.09 L  


 


Hgb  11.5 L  


 


Hct  35.0  


 


MCV  85.4  D  


 


MCH  28.1  


 


MCHC  32.9 L  


 


RDW  16.6 H  


 


Plt Count  158  D  


 


MPV  8.6  


 


Neut % (Auto)  90.9 H  


 


Lymph % (Auto)  6.6 L  


 


Mono % (Auto)  1.9  


 


Eos % (Auto)  0.4  


 


Baso % (Auto)  0.2  


 


Neut # (Auto)  6.2  


 


Lymph # (Auto)  0.5 L  


 


Mono # (Auto)  0.1  


 


Eos # (Auto)  0.0  


 


Baso # (Auto)  0.0  


 


Neutrophils % (Manual)  91 H  


 


Lymphocytes % (Manual)  7 L  


 


Monocytes % (Manual)  1  


 


Eosinophils % (Manual)  1  


 


Platelet Estimate  Normal  


 


Polychromasia  Slight  


 


Hypochromasia (manual)  Slight  


 


Anisocytosis (manual)  Slight  


 


Ovalocytes  Slight  


 


Sodium   137 


 


Potassium   4.9 


 


Chloride   106 


 


Carbon Dioxide   24 


 


Anion Gap   12 


 


BUN   21 H 


 


Creatinine   1.2 


 


Est GFR ( Amer)   > 60 


 


Est GFR (Non-Af Amer)   > 60 


 


Random Glucose   117 H 


 


Calcium   8.8 


 


Total Bilirubin   1.0 


 


AST   52 


 


ALT   13 L D 


 


Alkaline Phosphatase   63 


 


Total Protein   7.2 


 


Albumin   3.9 


 


Globulin   3.2 


 


Albumin/Globulin Ratio   1.2 


 


Lipase   68 


 


Urine Color    Yellow


 


Urine Clarity    Hazy


 


Urine pH    7.0


 


Ur Specific [place]    1.011


 


Urine Protein    2+ H


 


Urine Glucose (UA)    Normal


 


Urine Ketones    Negative


 


Urine Blood    2+ H


 


Urine Nitrate    Positive H


 


Urine Bilirubin    Negative


 


Urine Urobilinogen    Normal


 


Ur Leukocyte Esterase    3+ H


 


Urine WBC (Auto)    246 H


 


Urine RBC (Auto)    49 H


 


Urine WBC Clumps (Auto)    Few H


 


Ur Squamous Epith Cells    3


 


Urine Bacteria    Occ H














Assessment & Plan





- Assessment and Plan (Free Text)


Assessment: 





IMP:





UTI


UROLITHIASIS


L URETERAL STENT IN PLACE


CAD, P ANGIOPLASTY W STENTS





FULL NOTE TBD





THANK YOU


YS





- Date & Time


Date: 11/20/18


Time: 09:51

## 2018-11-21 LAB
ALBUMIN SERPL-MCNC: 3.5 [, G/DL] (ref 3.5–5)
ALBUMIN/GLOB SERPL: 1 [,] (ref 1–2.1)
ALT SERPL-CCNC: 20 [, U/L] (ref 21–72)
AST SERPL-CCNC: 15 [, U/L] (ref 17–59)
BASOPHILS # BLD AUTO: 0 [, K/UL] (ref 0–0.2)
BASOPHILS NFR BLD: 0.3 [, %] (ref 0–2)
BUN SERPL-MCNC: 17 [, MG/DL] (ref 9–20)
CALCIUM SERPL-MCNC: 8.3 [, MG/DL] (ref 8.6–10.4)
EOSINOPHIL # BLD AUTO: 0 [, K/UL] (ref 0–0.7)
EOSINOPHIL NFR BLD: 0.4 [, %] (ref 0–4)
ERYTHROCYTE [DISTWIDTH] IN BLOOD BY AUTOMATED COUNT: 16.3 [, %] (ref 11.5–14.5)
GFR NON-AFRICAN AMERICAN: 55 [,]
HGB BLD-MCNC: 11.1 [, G/DL] (ref 12–18)
LYMPHOCYTES # BLD AUTO: 0.8 [, K/UL] (ref 1–4.3)
LYMPHOCYTES NFR BLD AUTO: 11.4 [, %] (ref 20–40)
MCH RBC QN AUTO: 27.7 [, PG] (ref 27–31)
MCHC RBC AUTO-ENTMCNC: 32.7 [, G/DL] (ref 33–37)
MCV RBC AUTO: 84.7 [, FL] (ref 80–94)
MONOCYTES # BLD: 0.8 [, K/UL] (ref 0–0.8)
MONOCYTES NFR BLD: 11.7 [, %] (ref 0–10)
NEUTROPHILS # BLD: 5.2 [, K/UL] (ref 1.8–7)
NEUTROPHILS NFR BLD AUTO: 76.2 [, %] (ref 50–75)
NRBC BLD AUTO-RTO: 0 [, %] (ref 0–2)
PLATELET # BLD: 148 [, K/UL] (ref 130–400)
PMV BLD AUTO: 9.1 [, FL] (ref 7.2–11.7)
RBC # BLD AUTO: 4.02 [, MIL/UL] (ref 4.4–5.9)
WBC # BLD AUTO: 6.8 [, K/UL] (ref 4.8–10.8)

## 2018-11-21 RX ADMIN — Medication SCH TAB: at 10:11

## 2018-11-21 NOTE — CP.PCM.PN
Subjective





- Date & Time of Evaluation


Date of Evaluation: 11/21/18


Time of Evaluation: 20:00





- Subjective


Subjective: 





Patient had nausea this afternoon. No chest pain, no abdominal pain, afebrile. 

Urine culture reveals E. Coli sensitive to Cefepime.





Objective





- Vital Signs/Intake and Output


Vital Signs (last 24 hours): 


                                        











Temp Pulse Resp BP Pulse Ox


 


 98 F   76   20   121/64   99 


 


 11/21/18 16:36  11/21/18 16:36  11/21/18 16:36  11/21/18 16:36  11/21/18 16:36








Intake and Output: 


                                        











 11/21/18 11/22/18





 18:59 06:59


 


Intake Total 850 275


 


Balance 850 275














- Medications


Medications: 


                               Current Medications





Carvedilol (Coreg)  6.25 mg PO BID [facility]


   Last Admin: 11/21/18 17:17 Dose:  6.25 mg


Finasteride (Proscar)  5 mg PO DAILY [facility]


   Last Admin: 11/21/18 10:11 Dose:  5 mg


Furosemide (Lasix)  40 mg PO DAILY [facility]


   Last Admin: 11/21/18 10:11 Dose:  40 mg


Cefepime HCl 1 gm/ Dextrose  50 mls @ 100 mls/hr IVPB Q12H [facility]; Protocol


   Last Admin: 11/21/18 13:53 Dose:  100 mls/hr


Losartan Potassium (Cozaar)  100 mg PO DAILY [facility]


   Last Admin: 11/21/18 10:10 Dose:  100 mg


Multivitamins (Hexavitamin)  1 tab PO DAILY [facility]


   Last Admin: 11/21/18 10:11 Dose:  1 tab


Ondansetron HCl (Zofran Inj)  4 mg IVP Q4 PRN


   PRN Reason: Nausea/Vomiting


   Last Admin: 11/21/18 18:49 Dose:  4 mg


Rosuvastatin Calcium (Crestor)  10 mg PO HS [facility]


   Last Admin: 11/21/18 21:20 Dose:  10 mg


Tamsulosin HCl (Flomax)  0.4 mg PO DAILY [facility]


   Last Admin: 11/21/18 10:10 Dose:  0.4 mg


Ticagrelor (Brilinta)  90 mg PO BID [facility]


   Last Admin: 11/21/18 17:17 Dose:  90 mg











- Labs


Labs: 


                                        





                                 11/21/18 07:21 





                                 11/21/18 07:21 











- Constitutional


Appears: Well, No Acute Distress





- Head Exam


Head Exam: NORMAL INSPECTION





- Eye Exam


Eye Exam: Normal appearance


Pupil Exam: NORMAL ACCOMODATION





- ENT Exam


ENT Exam: Normal Exam





- Neck Exam


Neck Exam: Normal Inspection





- Respiratory Exam


Respiratory Exam: Clear to Ausculation Bilateral, NORMAL BREATHING PATTERN





- Cardiovascular Exam


Cardiovascular Exam: REGULAR RHYTHM, Murmur





- GI/Abdominal Exam


GI & Abdominal Exam: Soft, Normal Bowel Sounds





- Rectal Exam


Rectal Exam: Deferred





-  Exam


 Exam: NORMAL INSPECTION





- Back Exam


Back Exam: NORMAL INSPECTION





- Neurological Exam


Neurological Exam: Alert, Awake, Normal Gait, Oriented x3





- Psychiatric Exam


Psychiatric exam: Anxious





- Skin


Skin Exam: Dry, Intact, Normal Color, Warm





Assessment and Plan


(1) Pyelonephritis


Assessment & Plan: 


To continue IV Cefepime.


Status: Acute   





(2) Calculus of left kidney


Assessment & Plan: 


As per Dr Glory Oakes, patient scheduled for a cystoscopy and laser lithotrypsy

on Monday 11/26/2018. Will hold Brillinta on Friday.


Status: Acute

## 2018-11-22 LAB
ALBUMIN SERPL-MCNC: 3.5 [, G/DL] (ref 3.5–5)
ALBUMIN/GLOB SERPL: 1 [,] (ref 1–2.1)
ALT SERPL-CCNC: 17 [, U/L] (ref 21–72)
AST SERPL-CCNC: 19 [, U/L] (ref 17–59)
BASOPHILS # BLD AUTO: 0 [, K/UL] (ref 0–0.2)
BASOPHILS NFR BLD: 0.4 [, %] (ref 0–2)
BUN SERPL-MCNC: 18 [, MG/DL] (ref 9–20)
CALCIUM SERPL-MCNC: 8.8 [, MG/DL] (ref 8.6–10.4)
EOSINOPHIL # BLD AUTO: 0.1 [, K/UL] (ref 0–0.7)
EOSINOPHIL NFR BLD: 1.3 [, %] (ref 0–4)
ERYTHROCYTE [DISTWIDTH] IN BLOOD BY AUTOMATED COUNT: 16.1 [, %] (ref 11.5–14.5)
GFR NON-AFRICAN AMERICAN: 51 [,]
HGB BLD-MCNC: 11.7 [, G/DL] (ref 12–18)
LYMPHOCYTES # BLD AUTO: 0.7 [, K/UL] (ref 1–4.3)
LYMPHOCYTES NFR BLD AUTO: 17.1 [, %] (ref 20–40)
MCH RBC QN AUTO: 28.4 [, PG] (ref 27–31)
MCHC RBC AUTO-ENTMCNC: 33.9 [, G/DL] (ref 33–37)
MCV RBC AUTO: 83.8 [, FL] (ref 80–94)
MONOCYTES # BLD: 0.7 [, K/UL] (ref 0–0.8)
MONOCYTES NFR BLD: 17.7 [, %] (ref 0–10)
NEUTROPHILS # BLD: 2.6 [, K/UL] (ref 1.8–7)
NEUTROPHILS NFR BLD AUTO: 63.5 [, %] (ref 50–75)
NRBC BLD AUTO-RTO: 0.2 [, %] (ref 0–2)
PLATELET # BLD: 155 [, K/UL] (ref 130–400)
PMV BLD AUTO: 9.1 [, FL] (ref 7.2–11.7)
RBC # BLD AUTO: 4.12 [, MIL/UL] (ref 4.4–5.9)
WBC # BLD AUTO: 4.1 [, K/UL] (ref 4.8–10.8)

## 2018-11-22 RX ADMIN — Medication SCH TAB: at 09:31

## 2018-11-22 NOTE — CP.PCM.PN
Subjective





- Date & Time of Evaluation


Date of Evaluation: 11/22/18


Time of Evaluation: 16:56





- Subjective


Subjective: 





Patient has no more nausea. Scheduled for a cystoscopy and laser lithotrypsy on 

Monday. Will stop Brilinta in AM and start Heparin. Urine culture reveals E Coli

sensitive to Cefepime.





Objective





- Vital Signs/Intake and Output


Vital Signs (last 24 hours): 


                                        











Temp Pulse Resp BP Pulse Ox


 


 97.9 F   78   20   114/70   99 


 


 11/22/18 15:00  11/22/18 15:00  11/22/18 15:00  11/22/18 15:00  11/22/18 15:00








Intake and Output: 


                                        











 11/22/18 11/22/18





 06:59 18:59


 


Intake Total 275 850


 


Balance 275 850














- Medications


Medications: 


                               Current Medications





Carvedilol (Coreg)  6.25 mg PO BID [facility]


   Last Admin: 11/22/18 09:31 Dose:  6.25 mg


Finasteride (Proscar)  5 mg PO DAILY [facility]


   Last Admin: 11/22/18 09:31 Dose:  5 mg


Furosemide (Lasix)  40 mg PO DAILY [facility]


   Last Admin: 11/22/18 09:31 Dose:  40 mg


Cefepime HCl 1 gm/ Dextrose  50 mls @ 100 mls/hr IVPB Q12H [facility]; Protocol


   Last Admin: 11/22/18 11:54 Dose:  100 mls/hr


Losartan Potassium (Cozaar)  100 mg PO DAILY [facility]


   Last Admin: 11/22/18 09:31 Dose:  100 mg


Multivitamins (Hexavitamin)  1 tab PO DAILY [facility]


   Last Admin: 11/22/18 09:31 Dose:  1 tab


Ondansetron HCl (Zofran Inj)  4 mg IVP Q4 PRN


   PRN Reason: Nausea/Vomiting


   Last Admin: 11/21/18 18:49 Dose:  4 mg


Rosuvastatin Calcium (Crestor)  10 mg PO HS [facility]


   Last Admin: 11/21/18 21:20 Dose:  10 mg


Tamsulosin HCl (Flomax)  0.4 mg PO DAILY [facility]


   Last Admin: 11/22/18 09:31 Dose:  0.4 mg


Ticagrelor (Brilinta)  90 mg PO BID [facility]


   Last Admin: 11/22/18 09:31 Dose:  90 mg











- Labs


Labs: 


                                        





                                 11/22/18 06:42 





                                 11/22/18 06:42 











- Constitutional


Appears: Well, No Acute Distress





- Head Exam


Head Exam: NORMAL INSPECTION





- Eye Exam


Eye Exam: Normal appearance


Pupil Exam: NORMAL ACCOMODATION





- ENT Exam


ENT Exam: Normal Exam





- Neck Exam


Neck Exam: Normal Inspection





- Respiratory Exam


Respiratory Exam: Clear to Ausculation Bilateral, NORMAL BREATHING PATTERN





- Cardiovascular Exam


Cardiovascular Exam: REGULAR RHYTHM, Murmur





- GI/Abdominal Exam


GI & Abdominal Exam: Soft, Normal Bowel Sounds





- Rectal Exam


Rectal Exam: Deferred





- Extremities Exam


Extremities Exam: Normal Inspection





- Neurological Exam


Neurological Exam: Alert, Awake, Normal Gait, Oriented x3





- Psychiatric Exam


Psychiatric exam: Anxious





- Skin


Skin Exam: Dry, Intact, Normal Color, Warm





Assessment and Plan


(1) Pyelonephritis


Assessment & Plan: 


To continue Cefepime .


Status: Acute   





(2) Calculus of left kidney


Assessment & Plan: 


For a laser lithotrypsy on Monday by Dr Oakes.


Status: Acute

## 2018-11-22 NOTE — PCM.URO
Urology Progress Note





- General


General: No Complaints, Tolerating Diet





- Subjective


Abdominal Pain: No


Flank Pain: No


Nausea: No


Vomiting: No


Voiding Well: Yes


Dysuria: No


Hematuria: No


Stone Passed: No


Chest Pain: No


Fever & Chills: No





- Objective


Lab Studies: Reviewed


Lab Results Last 24 Hours: 


                         Laboratory Results - last 24 hr











  11/22/18 11/22/18





  06:42 06:42


 


WBC  4.1 L 


 


RBC  4.12 L 


 


Hgb  11.7 L 


 


Hct  34.5 L 


 


MCV  83.8 


 


MCH  28.4 


 


MCHC  33.9 


 


RDW  16.1 H 


 


Plt Count  155 


 


MPV  9.1 


 


Neut % (Auto)  63.5 


 


Lymph % (Auto)  17.1 L 


 


Mono % (Auto)  17.7 H 


 


Eos % (Auto)  1.3 


 


Baso % (Auto)  0.4 


 


Neut # (Auto)  2.6 


 


Lymph # (Auto)  0.7 L 


 


Mono # (Auto)  0.7 


 


Eos # (Auto)  0.1 


 


Baso # (Auto)  0.0 


 


Sodium   138


 


Potassium   3.7


 


Chloride   101


 


Carbon Dioxide   26


 


Anion Gap   15


 


BUN   18


 


Creatinine   1.4


 


Est GFR ( Amer)   > 60


 


Est GFR (Non-Af Amer)   51


 


Random Glucose   107


 


Calcium   8.8


 


Total Bilirubin   0.5


 


AST   19


 


ALT   17 L


 


Alkaline Phosphatase   51


 


Total Protein   7.0


 


Albumin   3.5


 


Globulin   3.4


 


Albumin/Globulin Ratio   1.0











Intake & Output: 


                                 Intake & Output











 11/21/18 11/22/18 11/22/18





 18:59 06:59 18:59


 


Intake Total 850 275 


 


Balance 850 275 


 


Intake:   


 


  Intake, IV Amount 50  


 


    Right Forearm 50  


 


  Oral 800 275 


 


Other:   


 


  # Voids   


 


    Urine, Voided 4 2 


 


  # Bowel Movements 0 0 











Vital Signs: 


                               Vital Signs - 24 hr











  11/21/18 11/22/18 11/22/18





  16:36 00:00 08:07


 


Temperature 98 F 98.3 F 97.8 F


 


Pulse Rate 76 77 79


 


Respiratory 20 20 20





Rate   


 


Blood Pressure 121/64 121/77 119/71


 


O2 Sat by Pulse 99 99 99





Oximetry   














  11/22/18





  09:31


 


Temperature 


 


Pulse Rate 


 


Respiratory 





Rate 


 


Blood Pressure 123/74


 


O2 Sat by Pulse 





Oximetry 











Imaging Studies: Reviewed





- Physical Exam


Abdominal Exam: Soft, Non-Tender, Non-Distended


Back: No CVA Tenderness


Genitalia: Without Inflammation





- Male


Phallus: Normal





- Plan


Ambulation - Out of Bed: Yes


Additional Information: IMP:  Urolithiasis.  UTI.  CAD.  Anticoaguation.  Rec/P:

 On rocephin for UTI.  Uretero-renoscopy and laser lithotripsy to follow.  

Anticoag schedule discussed with Attending  nursing staff.  Continue Brilinta 

today and Thurs. Then stop and bridge with Heparin.  Discussed w pt.  YS





- Date & Time of Note


Date: 11/21/18


Time: 12:15

## 2018-11-23 LAB
ALBUMIN SERPL-MCNC: 3.7 [, G/DL] (ref 3.5–5)
ALBUMIN/GLOB SERPL: 1 [,] (ref 1–2.1)
ALT SERPL-CCNC: 21 [, U/L] (ref 21–72)
ANISOCYTOSIS BLD QL SMEAR: SLIGHT [,]
AST SERPL-CCNC: 15 [, U/L] (ref 17–59)
BASOPHILS # BLD AUTO: 0 [, K/UL] (ref 0–0.2)
BASOPHILS NFR BLD: 0.9 [, %] (ref 0–2)
BUN SERPL-MCNC: 22 [, MG/DL] (ref 9–20)
CALCIUM SERPL-MCNC: 8.6 [, MG/DL] (ref 8.6–10.4)
EOSINOPHIL # BLD AUTO: 0.1 [, K/UL] (ref 0–0.7)
EOSINOPHIL NFR BLD: 1 [, %] (ref 0–4)
EOSINOPHIL NFR BLD: 3.3 [, %] (ref 0–4)
ERYTHROCYTE [DISTWIDTH] IN BLOOD BY AUTOMATED COUNT: 15.9 [, %] (ref 11.5–14.5)
GFR NON-AFRICAN AMERICAN: 51 [,]
HGB BLD-MCNC: 12.2 [, G/DL] (ref 12–18)
LYMPHOCYTE: 10 [, %] (ref 20–40)
LYMPHOCYTES # BLD AUTO: 0.9 [, K/UL] (ref 1–4.3)
LYMPHOCYTES NFR BLD AUTO: 24.5 [, %] (ref 20–40)
MCH RBC QN AUTO: 27.8 [, PG] (ref 27–31)
MCHC RBC AUTO-ENTMCNC: 33.1 [, G/DL] (ref 33–37)
MCV RBC AUTO: 84.1 [, FL] (ref 80–94)
MONOCYTE: 22 [, %] (ref 0–10)
MONOCYTES # BLD: 0.7 [, K/UL] (ref 0–0.8)
MONOCYTES NFR BLD: 20.1 [, %] (ref 0–10)
NEUTROPHILS # BLD: 1.8 [, K/UL] (ref 1.8–7)
NEUTROPHILS NFR BLD AUTO: 51.2 [, %] (ref 50–75)
NEUTROPHILS NFR BLD AUTO: 67 [, %] (ref 50–75)
NRBC BLD AUTO-RTO: 0.1 [, %] (ref 0–2)
PLATELET # BLD EST: NORMAL [,]
PLATELET # BLD: 166 [, K/UL] (ref 130–400)
PMV BLD AUTO: 9 [, FL] (ref 7.2–11.7)
RBC # BLD AUTO: 4.37 [, MIL/UL] (ref 4.4–5.9)
TOTAL CELLS COUNTED BLD: 100 [,]
WBC # BLD AUTO: 3.5 [, K/UL] (ref 4.8–10.8)

## 2018-11-23 RX ADMIN — Medication SCH TAB: at 09:49

## 2018-11-23 NOTE — CP.PCM.PN
Subjective





- Date & Time of Evaluation


Date of Evaluation: 11/23/18


Time of Evaluation: 19:08





- Subjective


Subjective: 





Patient developed hematuria 1 hour after receiving Heparin 5000 units s/c this 

AM. Will have to discontinue Heparin.





Objective





- Vital Signs/Intake and Output


Vital Signs (last 24 hours): 


                                        











Temp Pulse Resp BP Pulse Ox


 


 97.9 F   71   20   118/61   99 


 


 11/23/18 15:00  11/23/18 15:00  11/23/18 15:00  11/23/18 15:00  11/23/18 15:00











- Medications


Medications: 


                               Current Medications





Carvedilol (Coreg)  6.25 mg PO BID [facility]


   Last Admin: 11/23/18 09:49 Dose:  6.25 mg


Finasteride (Proscar)  5 mg PO DAILY [facility]


   Last Admin: 11/23/18 09:49 Dose:  5 mg


Furosemide (Lasix)  40 mg PO DAILY [facility]


   Last Admin: 11/23/18 09:49 Dose:  40 mg


Cefepime HCl 1 gm/ Dextrose  50 mls @ 100 mls/hr IVPB Q12H [facility]; Protocol


   Last Admin: 11/23/18 14:39 Dose:  100 mls/hr


Losartan Potassium (Cozaar)  100 mg PO DAILY [facility]


   Last Admin: 11/23/18 09:49 Dose:  100 mg


Multivitamins (Hexavitamin)  1 tab PO DAILY [facility]


   Last Admin: 11/23/18 09:49 Dose:  1 tab


Ondansetron HCl (Zofran Inj)  4 mg IVP Q4 PRN


   PRN Reason: Nausea/Vomiting


   Last Admin: 11/21/18 18:49 Dose:  4 mg


Rosuvastatin Calcium (Crestor)  10 mg PO [facility]


   Last Admin: 11/22/18 21:38 Dose:  10 mg


Tamsulosin HCl (Flomax)  0.4 mg PO DAILY [facility]


   Last Admin: 11/23/18 09:49 Dose:  0.4 mg











- Labs


Labs: 


                                        





                                 11/23/18 07:15 





                                 11/23/18 07:15 











- Constitutional


Appears: Well, No Acute Distress





- Head Exam


Head Exam: NORMOCEPHALIC





- Eye Exam


Eye Exam: Normal appearance





- ENT Exam


ENT Exam: Normal Exam





- Neck Exam


Neck Exam: Normal Inspection





- Respiratory Exam


Respiratory Exam: Clear to Ausculation Bilateral, NORMAL BREATHING PATTERN





- Cardiovascular Exam


Cardiovascular Exam: REGULAR RHYTHM, Murmur





- GI/Abdominal Exam


GI & Abdominal Exam: Soft, Normal Bowel Sounds





- Rectal Exam


Rectal Exam: Deferred





- Extremities Exam


Extremities Exam: Normal Inspection





- Back Exam


Back Exam: NORMAL INSPECTION





- Neurological Exam


Neurological Exam: Alert, Awake, Normal Gait, Oriented x3





- Psychiatric Exam


Psychiatric exam: Anxious





- Skin


Skin Exam: Dry, Erythema, Intact, Normal Color





Assessment and Plan


(1) Pyelonephritis


Assessment & Plan: 


On IV Maxipime.


Status: Acute   





(2) Calculus of left kidney


Assessment & Plan: 


For laser lithotrypsy on Monday.


Status: Acute   





(3) Hematuria


Assessment & Plan: 


Hold Heparin, Brilinta.


Status: Resolved

## 2018-11-24 RX ADMIN — Medication SCH TAB: at 09:56

## 2018-11-24 NOTE — CP.PCM.PN
Subjective





- Date & Time of Evaluation


Date of Evaluation: 11/24/18


Time of Evaluation: 15:45





- Subjective


Subjective: 





Patient still has hematuria. Heparin and Brillinta on hold . For a cystoscopy 

and laser lithotrypsy on Monday. To repeat CBC, CMP.





Objective





- Vital Signs/Intake and Output


Vital Signs (last 24 hours): 


                                        











Temp Pulse Resp BP Pulse Ox


 


 97.3 F L  72   20   107/65   99 


 


 11/24/18 16:00  11/24/18 16:00  11/24/18 16:00  11/24/18 16:00  11/24/18 16:00








Intake and Output: 


                                        











 11/24/18 11/25/18





 18:59 06:59


 


Intake Total 450 


 


Output Total 600 


 


Balance -150 














- Medications


Medications: 


                               Current Medications





Carvedilol (Coreg)  [facility]ID [facility]


   Last Admin: 11/24/18 17:56 Dose:  6.25 mg


Finasteride (Proscar)  5 mg PO [facility]ILY [facility]


   Last Admin: 11/24/18 09:56 Dose:  5 mg


Furosemide (Lasix)  40 mg [facility]ILY [facility]


   Last Admin: 11/24/18 09:55 Dose:  40 mg


Cefepime HCl 1 gm/ Dextrose  50 mls @ 100 mls/hr IVPB Q12H [facility]; Protocol


   Last Admin: 11/24/18 11:32 Dose:  100 mls/hr


Losartan Potassium (Cozaar)  100 mg PO [facility]ILY [facility]


   Last Admin: 11/24/18 09:56 Dose:  100 mg


Multivitamins (Hexavitamin)  1 tab PO [facility]ILY [facility]


   Last Admin: 11/24/18 09:56 Dose:  1 tab


Ondansetron HCl (Zofran Inj)  4 mg IVP Q4 PRN


   PRN Reason: Nausea/Vomiting


   Last Admin: 11/21/18 18:49 Dose:  4 mg


Rosuvastatin Calcium (Crestor)  10 mg PO HS [facility]


   Last Admin: 11/23/18 21:36 Dose:  10 mg


Tamsulosin HCl (Flomax)  0.4 mg PO DAILY [facility]


   Last Admin: 11/24/18 09:55 Dose:  0.4 mg











- Labs


Labs: 


                                        





                                 11/23/18 07:15 





                                 11/23/18 07:15 











- Constitutional


Appears: Well, No Acute Distress





- Head Exam


Head Exam: NORMAL INSPECTION





- Eye Exam


Eye Exam: Normal appearance


Pupil Exam: NORMAL ACCOMODATION





- ENT Exam


ENT Exam: Normal Exam





- Neck Exam


Neck Exam: Normal Inspection





- Respiratory Exam


Respiratory Exam: Clear to Ausculation Bilateral, NORMAL BREATHING PATTERN





- Cardiovascular Exam


Cardiovascular Exam: REGULAR RHYTHM, Murmur





- GI/Abdominal Exam


GI & Abdominal Exam: Soft, Normal Bowel Sounds





- Rectal Exam


Rectal Exam: Deferred





-  Exam


 Exam: NORMAL INSPECTION





- Extremities Exam


Extremities Exam: Normal Inspection





- Back Exam


Back Exam: NORMAL INSPECTION





- Neurological Exam


Neurological Exam: Alert, Awake, Oriented x3





- Psychiatric Exam


Psychiatric exam: Agitated





- Skin


Skin Exam: Normal Color, Warm





Assessment and Plan


(1) Pyelonephritis


Assessment & Plan: 


On IV antibiotics.


Status: Acute   





(2) Calculus of left kidney


Assessment & Plan: 


For laser lithotrypsy on Monday.


Status: Acute   





(3) Hematuria


Assessment & Plan: 


Heparin and Brillinta on hold.


Status: Acute

## 2018-11-25 LAB
ALBUMIN SERPL-MCNC: 3.4 [, G/DL] (ref 3.5–5)
ALBUMIN/GLOB SERPL: 1 [,] (ref 1–2.1)
ALT SERPL-CCNC: 18 [, U/L] (ref 21–72)
AST SERPL-CCNC: 19 [, U/L] (ref 17–59)
BASOPHILS # BLD AUTO: 0 [, K/UL] (ref 0–0.2)
BASOPHILS NFR BLD: 0.8 [, %] (ref 0–2)
BUN SERPL-MCNC: 30 [, MG/DL] (ref 9–20)
CALCIUM SERPL-MCNC: 8.5 [, MG/DL] (ref 8.6–10.4)
EOSINOPHIL # BLD AUTO: 0.1 [, K/UL] (ref 0–0.7)
EOSINOPHIL NFR BLD: 3.3 [, %] (ref 0–4)
ERYTHROCYTE [DISTWIDTH] IN BLOOD BY AUTOMATED COUNT: 15.8 [, %] (ref 11.5–14.5)
GFR NON-AFRICAN AMERICAN: 55 [,]
HGB BLD-MCNC: 11.3 [, G/DL] (ref 12–18)
LYMPHOCYTES # BLD AUTO: 1.1 [, K/UL] (ref 1–4.3)
LYMPHOCYTES NFR BLD AUTO: 34 [, %] (ref 20–40)
MCH RBC QN AUTO: 27.7 [, PG] (ref 27–31)
MCHC RBC AUTO-ENTMCNC: 33 [, G/DL] (ref 33–37)
MCV RBC AUTO: 83.9 [, FL] (ref 80–94)
MONOCYTES # BLD: 0.5 [, K/UL] (ref 0–0.8)
MONOCYTES NFR BLD: 16.4 [, %] (ref 0–10)
NEUTROPHILS # BLD: 1.5 [, K/UL] (ref 1.8–7)
NEUTROPHILS NFR BLD AUTO: 45.5 [, %] (ref 50–75)
NRBC BLD AUTO-RTO: 0.1 [, %] (ref 0–2)
PLATELET # BLD: 179 [, K/UL] (ref 130–400)
PMV BLD AUTO: 9.4 [, FL] (ref 7.2–11.7)
RBC # BLD AUTO: 4.1 [, MIL/UL] (ref 4.4–5.9)
WBC # BLD AUTO: 3.3 [, K/UL] (ref 4.8–10.8)

## 2018-11-25 RX ADMIN — Medication SCH TAB: at 09:28

## 2018-11-26 LAB
INR PPP: 1.2 [,]
PROTHROMBIN TIME: 13 [, SECONDS] (ref 9.7–12.2)

## 2018-11-26 PROCEDURE — 0TC78ZZ EXTIRPATION OF MATTER FROM LEFT URETER, VIA NATURAL OR ARTIFICIAL OPENING ENDOSCOPIC: ICD-10-PCS | Performed by: UROLOGY

## 2018-11-26 PROCEDURE — 0TC18ZZ EXTIRPATION OF MATTER FROM LEFT KIDNEY, VIA NATURAL OR ARTIFICIAL OPENING ENDOSCOPIC: ICD-10-PCS | Performed by: UROLOGY

## 2018-11-26 PROCEDURE — 0TP98DZ REMOVAL OF INTRALUMINAL DEVICE FROM URETER, VIA NATURAL OR ARTIFICIAL OPENING ENDOSCOPIC: ICD-10-PCS | Performed by: UROLOGY

## 2018-11-26 PROCEDURE — 0T778DZ DILATION OF LEFT URETER WITH INTRALUMINAL DEVICE, VIA NATURAL OR ARTIFICIAL OPENING ENDOSCOPIC: ICD-10-PCS | Performed by: UROLOGY

## 2018-11-26 PROCEDURE — BT1F1ZZ FLUOROSCOPY OF LEFT KIDNEY, URETER AND BLADDER USING LOW OSMOLAR CONTRAST: ICD-10-PCS | Performed by: UROLOGY

## 2018-11-26 RX ADMIN — Medication SCH: at 10:00

## 2018-11-26 NOTE — CON
DATE:  11/20/2018



UROLOGY CONSULTATION



Urology consultation is requested by Dr. Jules Spivey.  Urology

consultation is filled by Dr. Glory Oakes.



REASON FOR CONSULTATION:  Urinary tract infection and urolithiasis.



HISTORY OF PRESENT ILLNESS:  Mr. Sumner is a 67-year-old male, admitted

with urinary tract infection.



The patient presented with abdominal pain.  He had nausea and vomiting.  He

presented to the emergency room.  The patient was found to have urinary

tract infection.  He is admitted for further evaluation and therapy.  The

patient has been on antibiotics.



The patient voids with good urinary stream.  Occasional slow stream.  The

patient has had dysuria.  No recent fever or rigors.



The patient has a history of urolithiasis.  The patient has had ureteral

stones in the past.  The patient has renal stones in the past.  Two months

ago, the patient was treated for his renal stones with ureterorenoscopy and

laser lithotripsy.



The patient has significant history of coronary artery disease.  The

patient has had previous angioplasty and stent insertion.



The patient also has had repeat angioplasty for stent occlusion associated

with discontinuation of his anticoagulation.



Mr. Sumner also has significant prostatic enlargement, which has caused

bleeding in the past.



The patient reports no chest pain.  No shortness of breath.



The patient lives with his wife.  He does not smoke.



PHYSICAL EXAMINATION:

GENERAL:  The patient is a well-developed, well-nourished male, appearing

his stated age.  The patient is awake and alert.

VITAL SIGNS:  The patient is afebrile.

ABDOMEN:  Soft, nontender, mildly protuberant.  Nondistended.  No mass or

organomegaly.

BACK:  No CVA tenderness.



LABORATORY DATA:  Reviewed as well.



IMPRESSION:

1.  Urinary tract infection.

2.  Urolithiasis.

3.  Residual left lower pole and mid renal stones.

4.  Coronary artery disease.



RECOMMENDATIONS AND PLAN:  Continue antibiotic therapy.  Monitor clinical

course.  Urine culture is pending.  Possible further treatment for

urolithiasis during this admission.  I will discuss with the patient with

you regarding further urologic care.



Thank you for recommending the patient for urology consultation.





__________________________________________

Glory Oakes MD





cc:  Julse Spivey MD





DD:  11/24/2018 21:53:08

DT:  11/24/2018 21:57:05

[facility] # [id number]

## 2018-11-26 NOTE — RAD
Date of service: 



11/26/2018



HISTORY:

 LT. RENAL CALCULI 



COMPARISON:

8/27/2018



FINDINGS:



BOWEL:

Normal abdominal bowel gas pattern.  There is a grouping of 

calcifications overlying lower pole of the left kidney measuring 11 

mm conglomerate leak.  No definite right-sided calcification is seen. 

 A left ureteral stent is noted.



No hepatic or splenic enlargement.  No abdominal mass identified 



BONES:

Normal.



OTHER FINDINGS:

None.



IMPRESSION:

Left lower pole renal calculi.  Left ureteral stent.

## 2018-11-26 NOTE — CP.PCM.CON
History of Present Illness





- History of Present Illness


History of Present Illness: 





dictated





Past Patient History





- Infectious Disease


Hx of Infectious Diseases: None





- Tetanus Immunizations


Tetanus Immunization: Unknown





- Past Medical History & Family History


Past Medical History?: Yes





- Past Social History


Smoking Status: Former Smoker





- CARDIAC


Hx Cardiac Disorders: Yes


Hx Congestive Heart Failure: Yes


Hx Hypertension: Yes





- PULMONARY


Hx Respiratory Disorders: Yes


Hx Pneumonia: Yes





- NEUROLOGICAL


Hx Neurological Disorder: No





- HEENT


Hx HEENT Problems: Yes


Hx Deafness: Yes (HEARING AIDS)


Hx Glaucoma: Yes





- RENAL


Hx Chronic Kidney Disease: Yes


Hx Kidney Stones: Yes





- ENDOCRINE/METABOLIC


Hx Endocrine Disorders: Yes (Pre diabetes/no medication)


Hx Diabetes Mellitus Type 2: Yes


Other/Comment: "borderline diabetes"





- HEMATOLOGICAL/ONCOLOGICAL


Hx Blood Disorders: Yes


Hx Anemia: Yes (sec to blood loss.)





- INTEGUMENTARY


Hx Dermatological Problems: No





- MUSCULOSKELETAL/RHEUMATOLOGICAL


Hx Falls: No





- GASTROINTESTINAL


Hx Gastrointestinal Disorders: Yes


Hx Crohn's Disease: No


Hx Diverticulitis: No


Hx Gall Bladder Disease: No


Hx Gastritis: Yes


Hx Pancreatitis: No





- GENITOURINARY/GYNECOLOGICAL


Hx Genitourinary Disorders: Yes


Hx Bladder Stone: Yes (Obstructing left ureteral stone.)


Hx Hematuria: Yes


Hx Prostate Problems: Yes


Other/Comment: enlarged prostate





- PSYCHIATRIC


Hx Substance Use: No





- SURGICAL HISTORY


Hx Surgeries: Yes


Hx Coronary Stent: Yes (X4)





- ANESTHESIA


Hx Anesthesia: Yes


Hx Anesthesia Reactions: No (? HAD MI 2013 AFTER PROSTATE SURGERY-PT ALSO HAD 

BLEEDING)





Meds


Allergies/Adverse Reactions: 


                                    Allergies











Allergy/AdvReac Type Severity Reaction Status Date / Time


 


Penicillins Allergy Unknown HAPPENED Verified 08/23/18 14:58





   AS A  





   CHILD-UNKNOWN  





   REACTION  














- Medications


Medications: 


                               Current Medications





Carvedilol (Coreg)  6.25 mg PO BID [facility]


   Last Admin: 11/26/18 17:29 Dose:  6.25 mg


Finasteride (Proscar)  5 mg PO DAILY [facility]


   Last Admin: 11/26/18 10:00 Dose:  Not Given


Furosemide (Lasix)  40 mg PO DAILY [facility]


   Last Admin: 11/26/18 10:00 Dose:  Not Given


Cefepime HCl 1 gm/ Dextrose  50 mls @ 100 mls/hr IVPB Q12H [facility]; Protocol


   Last Admin: 11/26/18 12:00 Dose:  Not Given


Losartan Potassium (Cozaar)  100 mg PO DAILY [facility]


   Last Admin: 11/26/18 09:57 Dose:  100 mg


Multivitamins (Hexavitamin)  1 tab PO DAILY [facility]


   Last Admin: 11/26/18 10:00 Dose:  Not Given


Ondansetron HCl (Zofran Inj)  4 mg IVP Q4 PRN


   PRN Reason: Nausea/Vomiting


   Last Admin: 11/21/18 18:49 Dose:  4 mg


Rosuvastatin Calcium (Crestor)  10 mg PO [facility]


   Last Admin: 11/25/18 21:24 Dose:  10 mg


Tamsulosin HCl (Flomax)  0.4 mg PO DAILY [facility]


   Last Admin: 11/26/18 10:00 Dose:  Not Given











Results





- Vital Signs


Recent Vital Signs: 


                                Last Vital Signs











Temp  97.6 F   11/26/18 16:00


 


Pulse  68   11/26/18 16:00


 


Resp  20   11/26/18 16:00


 


BP  126/70   11/26/18 16:00


 


Pulse Ox  96   11/26/18 16:00














- Labs


Result Diagrams: 


                                 11/25/18 08:25





                                 11/25/18 08:25


Labs: 


                         Laboratory Results - last 24 hr











  11/26/18





  17:44


 


PT  13.0 H


 


INR  1.2

## 2018-11-26 NOTE — PCM.SURG1
Surgeon's Initial Post Op Note





- Surgeon's Notes


Surgeon: STEFAN Oakes


Assistant: none


Type of Anesthesia: General Endo


Pre-Operative Diagnosis: L renal calculi


Operative Findings: same


Post-Operative Diagnosis: same


Operation Performed: cysto.  L ureteroscopy.  Laser joe-lithotripsy.  Stone 

basketing.  RTG pyelogram.  Stent insertion


Specimen/Specimens Removed: urein.  Stone


Estimated Blood Loss: EBL {In ML}: 0


Blood Products Given: N/A


Post-Op Condition: Good


Date of Surgery/Procedure: 11/26/18


Time of Surgery/Procedure: 12:56

## 2018-11-26 NOTE — RAD
Date of service: 



11/26/2018



PROCEDURE:  Intraoperative Fluoroscopy. 



HISTORY:

LT. RENAL CALCULI



FINDINGS:

Fluoroscopic assistance was provided.  The fluoroscopy time = 166.7 

sec.  Radiation dose = 1.98 mGy-cm.  



Please refer to the operative report from Dr. BOWMAN, [place].

## 2018-11-26 NOTE — CP.PCM.PN
Subjective





- Date & Time of Evaluation


Date of Evaluation: 11/26/18


Time of Evaluation: 13:45





- Subjective


Subjective: 





Patient underwent a cystoscopy, left ureteroscopy and laser lithotrypsy and left

ureteral stent insertion this AM. Still with hematuria. Will hold Brillinta 

until to-ruff.





Objective





- Vital Signs/Intake and Output


Vital Signs (last 24 hours): 


                                        











Temp Pulse Resp BP Pulse Ox


 


 97.6 F   68   20   126/70   96 


 


 11/26/18 16:00  11/26/18 16:00  11/26/18 16:00  11/26/18 16:00  11/26/18 16:00








Intake and Output: 


                                        











 11/26/18 11/27/18





 18:59 06:59


 


Intake Total 1480 


 


Output Total 200 


 


Balance 1280 














- Medications


Medications: 


                               Current Medications





Carvedilol (Coreg)  6.25 mg PO BID [facility]


   Last Admin: 11/26/18 17:29 Dose:  6.25 mg


Finasteride (Proscar)  5 mg PO DAILY [facility]


   Last Admin: 11/26/18 10:00 Dose:  Not Given


Furosemide (Lasix)  40 mg PO DAILY [facility]


   Last Admin: 11/26/18 10:00 Dose:  Not Given


Cefepime HCl 1 gm/ Dextrose  50 mls @ 100 mls/hr IVPB Q12H [facility]; Protocol


   Last Admin: 11/26/18 12:00 Dose:  Not Given


Losartan Potassium (Cozaar)  100 mg PO DAILY [facility]


   Last Admin: 11/26/18 09:57 Dose:  100 mg


Multivitamins (Hexavitamin)  1 tab PO DAILY [facility]


   Last Admin: 11/26/18 10:00 Dose:  Not Given


Ondansetron HCl (Zofran Inj)  4 mg IVP Q4 PRN


   PRN Reason: Nausea/Vomiting


   Last Admin: 11/21/18 18:49 Dose:  4 mg


Rosuvastatin Calcium (Crestor)  10 mg PO HS [facility]


   Last Admin: 11/25/18 21:24 Dose:  10 mg


Tamsulosin HCl (Flomax)  0.4 mg PO DAILY [facility]


   Last Admin: 11/26/18 10:00 Dose:  Not Given











- Labs


Labs: 


                                        





                                 11/25/18 08:25 





                                 11/25/18 08:25 





                                        











PT  13.0 SECONDS (9.7-12.2)  H  11/26/18  17:44    


 


INR  1.2   11/26/18  17:44    














- Constitutional


Appears: Well, No Acute Distress





- Head Exam


Head Exam: NORMAL INSPECTION





- Eye Exam


Eye Exam: Normal appearance





- ENT Exam


ENT Exam: Normal Exam





- Neck Exam


Neck Exam: Normal Inspection





- Respiratory Exam


Respiratory Exam: Clear to Ausculation Bilateral





- Cardiovascular Exam


Cardiovascular Exam: REGULAR RHYTHM, Murmur





- GI/Abdominal Exam


GI & Abdominal Exam: Soft, Normal Bowel Sounds





- Rectal Exam


Rectal Exam: Deferred





- Extremities Exam


Extremities Exam: Normal Inspection





- Back Exam


Back Exam: NORMAL INSPECTION





- Neurological Exam


Neurological Exam: Alert, Awake, Normal Gait, Oriented x3





- Psychiatric Exam


Psychiatric exam: Anxious





- Skin


Skin Exam: Dry, Intact, Normal Color, Warm





Assessment and Plan


(1) Pyelonephritis


Assessment & Plan: 


On Maxipime IV.


Status: Acute   





(2) Calculus of left kidney


Assessment & Plan: 


S/p lase lithotrypsy this AM.


Status: Acute   





(3) Hematuria


Assessment & Plan: 


Continue to hold Brillinta one more day.


Status: Acute

## 2018-11-27 VITALS
SYSTOLIC BLOOD PRESSURE: 116 MMHG | RESPIRATION RATE: 18 BRPM | TEMPERATURE: 97.9 F | DIASTOLIC BLOOD PRESSURE: 62 MMHG | OXYGEN SATURATION: 95 %

## 2018-11-27 VITALS — HEART RATE: 66 BPM

## 2018-11-27 LAB
ALBUMIN SERPL-MCNC: 3.8 [, G/DL] (ref 3.5–5)
ALBUMIN/GLOB SERPL: 1.1 [,] (ref 1–2.1)
ALT SERPL-CCNC: 17 [, U/L] (ref 21–72)
AST SERPL-CCNC: 21 [, U/L] (ref 17–59)
BASOPHILS # BLD AUTO: 0 [, K/UL] (ref 0–0.2)
BASOPHILS NFR BLD: 0.7 [, %] (ref 0–2)
BUN SERPL-MCNC: 21 [, MG/DL] (ref 9–20)
CALCIUM SERPL-MCNC: 8.9 [, MG/DL] (ref 8.6–10.4)
EOSINOPHIL # BLD AUTO: 0.1 [, K/UL] (ref 0–0.7)
EOSINOPHIL NFR BLD: 2.2 [, %] (ref 0–4)
ERYTHROCYTE [DISTWIDTH] IN BLOOD BY AUTOMATED COUNT: 15.7 [, %] (ref 11.5–14.5)
GFR NON-AFRICAN AMERICAN: 55 [,]
HGB BLD-MCNC: 11.9 [, G/DL] (ref 12–18)
LYMPHOCYTES # BLD AUTO: 1.2 [, K/UL] (ref 1–4.3)
LYMPHOCYTES NFR BLD AUTO: 22 [, %] (ref 20–40)
MCH RBC QN AUTO: 28 [, PG] (ref 27–31)
MCHC RBC AUTO-ENTMCNC: 33 [, G/DL] (ref 33–37)
MCV RBC AUTO: 84.9 [, FL] (ref 80–94)
MONOCYTES # BLD: 0.5 [, K/UL] (ref 0–0.8)
MONOCYTES NFR BLD: 10.1 [, %] (ref 0–10)
NEUTROPHILS # BLD: 3.5 [, K/UL] (ref 1.8–7)
NEUTROPHILS NFR BLD AUTO: 65 [, %] (ref 50–75)
NRBC BLD AUTO-RTO: 0 [, %] (ref 0–2)
PLATELET # BLD: 217 [, K/UL] (ref 130–400)
PMV BLD AUTO: 9 [, FL] (ref 7.2–11.7)
RBC # BLD AUTO: 4.25 [, MIL/UL] (ref 4.4–5.9)
WBC # BLD AUTO: 5.4 [, K/UL] (ref 4.8–10.8)

## 2018-11-27 RX ADMIN — Medication SCH TAB: at 10:39

## 2018-11-27 NOTE — CP.PCM.PN
Subjective





- Date & Time of Evaluation


Date of Evaluation: 11/27/18


Time of Evaluation: 11:11





- Subjective


Subjective: 





                                     Patient had cystoscopy done yesterday. 

Patuient toleratated the procedure very well. No Bleeding. A stent was put in. 





             Will start on Brilinta thoday. Will still continue the IV antibiot

ics. 





Objective





- Vital Signs/Intake and Output


Vital Signs (last 24 hours): 


                                        











Temp Pulse Resp BP Pulse Ox


 


 98.0 F   66   20   115/69   94 L


 


 11/27/18 07:10  11/27/18 07:10  11/27/18 07:10  11/27/18 10:40  11/27/18 07:10








Intake and Output: 


                                        











 11/27/18 11/27/18





 06:59 18:59


 


Output Total  200


 


Balance  -200














- Medications


Medications: 


                               Current Medications





Carvedilol (Coreg)  6.25 mg PO BID [facility]


   Last Admin: 11/27/18 10:40 Dose:  6.25 mg


Finasteride (Proscar)  5 mg PO DAILY [facility]


   Last Admin: 11/27/18 10:39 Dose:  5 mg


Furosemide (Lasix)  40 mg PO DAILY [facility]


   Last Admin: 11/27/18 10:40 Dose:  40 mg


Cefepime HCl 1 gm/ Dextrose  50 mls @ 100 mls/hr IVPB Q12H [facility]; Protocol


   Last Admin: 11/27/18 00:36 Dose:  100 mls/hr


Losartan Potassium (Cozaar)  100 mg PO DAILY [facility]


   Last Admin: 11/26/18 09:57 Dose:  100 mg


Multivitamins (Hexavitamin)  1 tab PO DAILY [facility]


   Last Admin: 11/27/18 10:39 Dose:  1 tab


Ondansetron HCl (Zofran Inj)  4 mg IVP Q4 PRN


   PRN Reason: Nausea/Vomiting


   Last Admin: 11/21/18 18:49 Dose:  4 mg


Rosuvastatin Calcium (Crestor)  10 mg PO HS [facility]


   Last Admin: 11/26/18 21:28 Dose:  10 mg


Tamsulosin HCl (Flomax)  0.4 mg PO DAILY [facility]


   Last Admin: 11/26/18 10:00 Dose:  Not Given


Ticagrelor (Brilinta)  90 mg PO BID [facility]











- Labs


Labs: 


                                        





                                 11/25/18 08:25 





                                 11/25/18 08:25 





                                        











PT  13.0 SECONDS (9.7-12.2)  H  11/26/18  17:44    


 


INR  1.2   11/26/18  17:44    














- Constitutional


Appears: Well, Non-toxic, No Acute Distress





- Head Exam


Head Exam: ATRAUMATIC, NORMAL INSPECTION, NORMOCEPHALIC





- Eye Exam


Eye Exam: EOMI, Normal appearance


Pupil Exam: PERRL





- ENT Exam


ENT Exam: Mucous Membranes Moist, Normal External Ear Exam





- Neck Exam


Neck Exam: Full ROM





- Respiratory Exam


Respiratory Exam: Clear to Ausculation Bilateral, NORMAL BREATHING PATTERN





- Cardiovascular Exam


Cardiovascular Exam: REGULAR RHYTHM, +S1, +S2





- GI/Abdominal Exam


GI & Abdominal Exam: Soft, Normal Bowel Sounds





- Rectal Exam


Rectal Exam: Deferred





- Back Exam


Back Exam: Full ROM, NORMAL INSPECTION





- Neurological Exam


Neurological Exam: Alert, Awake, Oriented x3





- Psychiatric Exam


Psychiatric exam: Normal Affect, Normal Mood





- Skin


Skin Exam: Dry, Intact, Normal Color, Warm





Assessment and Plan





- Assessment and Plan (Free Text)


Assessment: 





                                      Assessment:








           Acute pyelonephritis.





         Left nephrolitiasis.





          Status cystoscopy.





         CAD.





        Pre-diabetes.





       Anemia.





        HTN.


Plan: 





                                            Plan:








         Continue IV antibiotics.





        Start on Brilinta PO.

## 2018-11-27 NOTE — CP.PCM.PN
Subjective





- Date & Time of Evaluation


Date of Evaluation: 11/27/18


Time of Evaluation: 17:27





- Subjective


Subjective: 





Patient has no complaint. No more hematuria. Afebrile. Will restart Brilinta and

ASA. Will switch to Bactrim DS PO BID for another week and discharge him home 

today. F/U with DR Oakes and Dr Burnham in one week.





Objective





- Vital Signs/Intake and Output


Vital Signs (last 24 hours): 


                                        











Temp Pulse Resp BP Pulse Ox


 


 97.9 F   66   18   116/62   95 


 


 11/27/18 16:00  11/27/18 16:00  11/27/18 16:00  11/27/18 16:00  11/27/18 16:00








Intake and Output: 


                                        











 11/27/18 11/27/18





 06:59 18:59


 


Output Total  200


 


Balance  -200














- Medications


Medications: 


                               Current Medications





Carvedilol (Coreg)  6.25 mg PO BID [facility]


   Last Admin: 11/27/18 10:40 Dose:  6.25 mg


Finasteride (Proscar)  5 mg PO DAILY [facility]


   Last Admin: 11/27/18 10:39 Dose:  5 mg


Furosemide (Lasix)  40 mg PO DAILY [facility]


   Last Admin: 11/27/18 10:40 Dose:  40 mg


Losartan Potassium (Cozaar)  100 mg PO DAILY [facility]


   Last Admin: 11/27/18 11:37 Dose:  100 mg


Multivitamins (Hexavitamin)  1 tab PO DAILY [facility]


   Last Admin: 11/27/18 10:39 Dose:  1 tab


Ondansetron HCl (Zofran Inj)  4 mg IVP Q4 PRN


   PRN Reason: Nausea/Vomiting


   Last Admin: 11/21/18 18:49 Dose:  4 mg


Rosuvastatin Calcium (Crestor)  10 mg PO HS [facility]


   Last Admin: 11/26/18 21:28 Dose:  10 mg


Tamsulosin HCl (Flomax)  0.4 mg PO DAILY [facility]


   Last Admin: 11/27/18 11:37 Dose:  0.4 mg


Ticagrelor (Brilinta)  90 mg PO BID [facility]


   Last Admin: 11/27/18 11:37 Dose:  90 mg


Trimethoprim/Sulfamethoxazole (Bactrim Ds Tab)  1 tab PO Q12H [facility]; Protocol


   Last Admin: 11/27/18 15:55 Dose:  1 tab











- Labs


Labs: 


                                        





                                 11/27/18 11:36 





                                 11/27/18 11:36 





                                        











PT  13.0 SECONDS (9.7-12.2)  H  11/26/18  17:44    


 


INR  1.2   11/26/18  17:44    














- Constitutional


Appears: Well, No Acute Distress





- Head Exam


Head Exam: NORMAL INSPECTION





- Eye Exam


Eye Exam: Normal appearance


Pupil Exam: NORMAL ACCOMODATION





- ENT Exam


ENT Exam: Normal Exam





- Neck Exam


Neck Exam: Normal Inspection





- Respiratory Exam


Respiratory Exam: Clear to Ausculation Bilateral, NORMAL BREATHING PATTERN





- Cardiovascular Exam


Cardiovascular Exam: REGULAR RHYTHM, Murmur





- GI/Abdominal Exam


GI & Abdominal Exam: Soft, Normal Bowel Sounds





- Rectal Exam


Rectal Exam: Deferred





- Extremities Exam


Extremities Exam: Normal Inspection





- Back Exam


Back Exam: NORMAL INSPECTION





- Neurological Exam


Neurological Exam: Alert, Awake, Oriented x3





- Psychiatric Exam


Psychiatric exam: Anxious





- Skin


Skin Exam: Dry, Intact, Normal Color, Warm





Assessment and Plan


(1) Pyelonephritis


Assessment & Plan: 


To continue Bactrim DS PO BID for one more week.


Status: Acute   





(2) Calculus of left kidney


Assessment & Plan: 


S/p laser lithotrypsy and ureteral stent insertion.


Status: Acute   





(3) Hematuria


Status: Resolved

## 2018-11-27 NOTE — PCM.URO
Urology Progress Note





- Objective


Lab Results Last 24 Hours: 


                         Laboratory Results - last 24 hr











  11/26/18





  17:44


 


PT  13.0 H


 


INR  1.2











Intake & Output: 


                                 Intake & Output











 11/26/18 11/27/18 11/27/18





 18:59 06:59 18:59


 


Intake Total 1480  


 


Output Total 200  200


 


Balance 1280  -200


 


Intake:   


 


  IV 1000  


 


  Oral 480  


 


Output:   


 


  Urine 200  200


 


    Urine, Voided 200  200


 


Other:   


 


  # Voids   


 


    Urine, Voided   2


 


  # Bowel Movements 0  











Vital Signs: 


                               Vital Signs - 24 hr











  11/26/18 11/26/18 11/26/18





  12:50 13:00 13:15


 


Temperature 97.6 F  


 


Pulse Rate 64 65 68


 


Respiratory 13 14 15





Rate   


 


Blood Pressure 124/65 125/70 126/69


 


O2 Sat by Pulse 100 100 100





Oximetry   














  11/26/18 11/26/18 11/26/18





  13:30 13:45 14:00


 


Temperature   


 


Pulse Rate 77 73 66


 


Respiratory 16 14 12





Rate   


 


Blood Pressure 129/72 114/78 124/72


 


O2 Sat by Pulse 100 98 98





Oximetry   














  11/26/18 11/26/18 11/26/18





  14:15 14:19 16:00


 


Temperature 98.2 F 98 F 97.6 F


 


Pulse Rate 65 55 L 68


 


Respiratory 14 20 20





Rate   


 


Blood Pressure 122/74 122/80 126/70


 


O2 Sat by Pulse 99 99 96





Oximetry   














  11/26/18 11/27/18





  22:09 07:10


 


Temperature 98.2 F 98.0 F


 


Pulse Rate 66 66


 


Respiratory 20 20





Rate  


 


Blood Pressure 124/70 122/67


 


O2 Sat by Pulse 98 94 L





Oximetry

## 2018-11-27 NOTE — CON
DATE:  11/26/2018



INFECTIOUS DISEASE CONSULT



REQUESTED BY:  Dr. Spivey.



I was away.  This consult was requested on 11/19/2018, but I came back

today.



HISTORY OF PRESENT ILLNESS:  This patient is known to me from prior

admission.  He is a 67-year-old male.  He was admitted with lower abdominal

pain, chills, fever, nausea, and vomiting from 11/19/2018.  He denied any

burning urination, but he has history of stones, and today, he underwent

surgery and he says they took out all the stones.  He has history of also

ischemic cardiomyopathy and has had many percutaneous interventions of his

coronary arteries, and history of hypertension, high cholesterol, kidney

stones, multiple lithotripsy.  He also had a left ureteral stent.  Social

history is significant for smoking, alcohol abuse many years ago, and he

came in with UTI and he had E. coli which was sensitive to cefepime.  At

present, he is on cefepime, and he was given Rocephin on the initial day. 

He was admitted with chills and fever, also was having abdominal pain,

nausea, and vomiting.  He had urinary urgency.  HE IS ALLERGIC TO

PENICILLIN.  In the past, he has tolerated Merrem.  Social history is

significant for former smoker, history of alcohol abuse in the past.  No

drug abuse.  He has cardiac history of atrial fibrillation, cardiac

arrhythmias, congestive heart failure, hypercholesterolemia, hypertension,

peripheral edema which is not present at this time, history of pneumonia in

the past.  No neurological problems, and he uses hearing aids.  He also has

history of glaucoma.  Kidney, he has chronic kidney disease and history of

kidney stones.  Endocrine wise, he is prediabetic with no medications and

he says he has borderline diabetes and he is anemic secondary to blood loss

and denies any skin condition.  Does complain of joint pains, back pain. 

History of fall, now it is not there.  History of herniated disk is

present.  GI, he has no Crohn's, no diverticulitis, no gallbladder disease.

He does have gastritis.  He denies any pancreatitis.  , he has stones

which was obstructing left ureter, and he has history of hematuria and

history of prostate problem and enlarged prostate.  Psychiatric, history of

substance abuse, history of coronary artery stents x4.  Anesthesia, he has

had in the past for in 2013, he had a surgery for prostate, also has had

bleeding.  He had a ureteral stent placed on last admission.



MEDICATIONS:  At the present time, he is on Coreg 6.25 b.i.d., cefepime he

is on 1 g every 12.  He is on Proscar, Lasix, Cozaar, hexavitamin, Zofran. 

He is on rosuvastatin which is Crestor, and he is on tamsulosin which was

not given today because he went to the OR.



In the OR, he went through his procedure and we will see the OR note. 

Initial postop note which shows distal left ureteroscopy, laser renal

lithotripsy, stone basketing, retrograde pyelogram, stent insertion, so he

has another stent insertion and he had left renal calculi.



PHYSICAL EXAMINATION:

VITAL SIGNS:  T-max is 97.6, pulse 68, blood pressure 126/70, respirations

are 20.

HEENT:  Head is atraumatic, normocephalic.  Pupils are reacting to light,

and he is hard of hearing.  His tongue is moist.

NECK:  Supple.  JVP is flat.

LUNGS:  Clear to auscultation.  No crackles or rales present.

HEART:  S1, S2 are regular.  No murmurs appreciated.

ABDOMEN:  Soft, nontender.  No guarding, no rigidity present.

EXTREMITIES:  Have no edema, clubbing, or cyanosis.

NEUROLOGICAL:  There is no deficit.



LABORATORIES:  Show white count is 3.3, hemoglobin 11.3, hematocrit 34.4,

platelet count is 179, and his potassium is 3.9 today, BUN is 30,

creatinine is 1.3.



IMAGING:  He had abdominal CT, abdominopelvic CT when he came in on

11/19/2018 which showed bilateral renal calculi, left ureteral stent,

numerous bilateral renal cysts, small paraumbilical hernia with slight

thickening of the distal esophagus likely protrusion, gastric mucosa,

stomach is collapsed, also has enlarged prostate likely due to BPH. 

Recommend PSA.  He does have enlarged prostate.  So, at this time I saw the

culture from 11/19/2018.  It grew E. coli which was sensitive to cefepime

and he is on 1 g every 12 hours.



ASSESSMENT AND PLAN:  he has renal insufficiency, bilateral renal stones,

pyelonephritis, calculus, and has history of hematuria.  Suggest at this

time to continue present treatment, and he still has a new stent placed. 

He will need to follow with the urologist frequently and at this time to

continue cefepime.  Since he has tolerated cefepime, I think he may

tolerate Keflex, and otherwise he will have to go on Bactrim for a week. 

We will follow and follow with Dr. Spivey as well as with Dr. Oakes.





__________________________________________

Artur Lomas MD





DD:  11/26/2018 20:33:13

DT:  11/26/2018 20:38:59

Job # [id number]

## 2018-11-27 NOTE — OP
PROCEDURE DATE:  11/26/2018



PREOPERATIVE DIAGNOSIS:  Left renal calculi.



POSTOPERATIVE DIAGNOSIS:  Left renal calculi.



PROCEDURE:  Cystoscopy.  Left ureterorenoscopy .  Left laser ureteral

lithotripsy.  Left renal stone basketing.  Left retrograde pyelogram. 

Insertion of left ureteral stent.



OPERATING SURGEON:  Glory Oakes MD.



Procedure was performed under video endoscopic control as well as under

fluoroscopic control.



FINDINGS:   films of the abdomen revealed the left ureteral stent in

place as well as small and faint and mildly radiodense calcifications

overlying the mid and lower pole of the kidney.



DESCRIPTION OF PROCEDURE:  The patient received general anesthesia.  The

patient was placed in lithotomy position.  Genitalia prepped and draped

sterilely.  The patient also received antibiotic therapy.



A 22-English cystoscope sheath was introduced under direct vision.  Urethra,

prostate, bladder were inspected.  There was noted to be moderate prostatic

enlargement.  The urine was clear.  Urine was sent for bacteriologic

examination.



The left ureteral stent was identified.  The stent was grasped with rigid

grasping forceps and brought through the cystoscope sheath to the level of

the distal end of cystoscope sheath.



A 0.035-inch guidewire was inserted into the ureteral stent and passed up

to level of kidney.  The stent was removed.



A second guidewire was inserted up to level of kidney using the 10-English

double-lumen ureteral catheter.



The ureteral access sheath was inserted over the working wire.  The

obturator of the ureteral access sheath was removed.



A flexible 7-English ureterorenoscope was inserted and back loaded over the

guidewire.  The ureteroscope was passed in through the ureteral access

sheath into the upper ureter and into the kidney.



Ureterorenoscopy was performed.  The various calyces starting with the

upper pole were inspected.  There were noted to be a few mild Denton's

plaques in the mid and lower poles.



The stones were identified in the mid and lower pole as well.



Laser ureteral lithotripsy was performed using the 200 micron fiber.  There

was excellent fragmentation.  One of the large fragments was removed using

the Zero Tip Nitinol basket.  Further ureteral lithotripsy and laser

lithotripsy was performed until there was no significant fragments

remaining.  Irrigation of the calyces cleared the remaining fragments.



Iodinated contrast dye was instilled through the ureteroscope.  The

pyelogram was thus visualized.  Under ureteroscopic as well as fluoroscopic

control, all the calyces were reinspected.  There were no residual

significant fragments noted.



The ureteroscope and ureteral access sheath were removed under direct

vision.  There was no ureteral trauma.



The cystoscope was back loaded over the remaining guidewire.  A 6-English

multilength stent was inserted over the guidewire.  Proper stent position

was confirmed with fluoroscopy and endoscopy.  The guidewire was removed

and stent was left in place.  The bladder was then drained.  Cystoscope

sheath removed.



A distal suture was left to exit from the distal end of the stent per

urethra.  This suture was secured to the penis with Tegaderm tape.



The patient was returned to supine position.  The patient tolerated

procedure without complication.







__________________________________________

Glory Oakes MD



DD:  11/27/2018 6:36:29

DT:  11/27/2018 6:39:37

Job # [id number]

## 2018-11-28 NOTE — CP.PCM.DIS
Provider





- Provider


Date of Admission: 


11/21/18 15:45





Attending physician: 


Jules Spivey MD





Primary care physician: 


Kiley Burnham MD.


Consults: 


Dr Glory Oakes (Urology)   Dr Jefferson Lomas ( Infectious Disease )  Dr Bo Rodriguez ( Infectious disease ).


Time Spent in preparation of Discharge (in minutes): 45





Diagnosis





- Discharge Diagnosis


(1) Pyelonephritis


Status: Acute   





(2) Calculus of left kidney


Status: Acute   





(3) Hematuria


Status: Resolved   





Hospital Course





- Lab Results


Lab Results: 


                                  Micro Results





11/26/18 08:04   Urine   Urine Culture - Final


                            No Growth (<1,000 CFU/ML)


11/24/18 Unknown   Urine   Urine Culture - Final


                                No Growth (<1,000 CFU/ML)


11/19/18 14:00   Urine   Urine Culture - Final


                            Escherichia Coli





                             Most Recent Lab Values











WBC  5.4 K/uL (4.8-10.8)  D 11/27/18  11:36    


 


RBC  4.25 Mil/uL (4.40-5.90)  L  11/27/18  11:36    


 


Hgb  11.9 g/dL (12.0-18.0)  L  11/27/18  11:36    


 


Hct  36.1 % (35.0-51.0)   11/27/18  11:36    


 


MCV  84.9 fL (80.0-94.0)   11/27/18  11:36    


 


MCH  28.0 pg (27.0-31.0)   11/27/18  11:36    


 


MCHC  33.0 g/dL (33.0-37.0)   11/27/18  11:36    


 


RDW  15.7 % (11.5-14.5)  H  11/27/18  11:36    


 


Plt Count  217 K/uL (130-400)   11/27/18  11:36    


 


MPV  9.0 fL (7.2-11.7)   11/27/18  11:36    


 


Neut % (Auto)  65.0 % (50.0-75.0)   11/27/18  11:36    


 


Lymph % (Auto)  22.0 % (20.0-40.0)   11/27/18  11:36    


 


Mono % (Auto)  10.1 % (0.0-10.0)  H  11/27/18  11:36    


 


Eos % (Auto)  2.2 % (0.0-4.0)   11/27/18  11:36    


 


Baso % (Auto)  0.7 % (0.0-2.0)   11/27/18  11:36    


 


Neut # (Auto)  3.5 K/uL (1.8-7.0)   11/27/18  11:36    


 


Lymph # (Auto)  1.2 K/uL (1.0-4.3)   11/27/18  11:36    


 


Mono # (Auto)  0.5 K/uL (0.0-0.8)   11/27/18  11:36    


 


Eos # (Auto)  0.1 K/uL (0.0-0.7)   11/27/18  11:36    


 


Baso # (Auto)  0.0 K/uL (0.0-0.2)   11/27/18  11:36    


 


Neutrophils % (Manual)  67 % (50-75)   11/23/18  07:15    


 


Lymphocytes % (Manual)  10 % (20-40)  L  11/23/18  07:15    


 


Monocytes % (Manual)  22 % (0-10)  H  11/23/18  07:15    


 


Eosinophils % (Manual)  1 % (0-4)   11/23/18  07:15    


 


Platelet Estimate  Normal  (NORMAL)   11/23/18  07:15    


 


Polychromasia  Slight   11/19/18  10:46    


 


Hypochromasia (manual)  Slight   11/19/18  10:46    


 


Anisocytosis (manual)  Slight   11/23/18  07:15    


 


Ovalocytes  Slight   11/19/18  10:46    


 


PT  13.0 SECONDS (9.7-12.2)  H  11/26/18  17:44    


 


INR  1.2   11/26/18  17:44    


 


Sodium  136 mmol/L (132-148)   11/27/18  11:36    


 


Potassium  5.0 mmol/L (3.6-5.2)   11/27/18  11:36    


 


Chloride  99 mmol/L ()   11/27/18  11:36    


 


Carbon Dioxide  28 mmol/L (22-30)   11/27/18  11:36    


 


Anion Gap  13  (10-20)   11/27/18  11:36    


 


BUN  21 mg/dL (9-20)  H  11/27/18  11:36    


 


Creatinine  1.3 mg/dL (0.8-1.5)   11/27/18  11:36    


 


Est GFR ( Amer)  > 60   11/27/18  11:36    


 


Est GFR (Non-Af Amer)  55   11/27/18  11:36    


 


Random Glucose  109 mg/dL ()   11/27/18  11:36    


 


Calcium  8.9 mg/dl (8.6-10.4)   11/27/18  11:36    


 


Phosphorus  4.3 mg/dL (2.5-4.5)   11/25/18  08:25    


 


Magnesium  2.1 mg/dL (1.6-2.3)   11/25/18  08:25    


 


Total Bilirubin  0.3 mg/dL (0.2-1.3)   11/27/18  11:36    


 


AST  21 U/L (17-59)   11/27/18  11:36    


 


ALT  17 U/L (21-72)  L  11/27/18  11:36    


 


Alkaline Phosphatase  48 U/L ()   11/27/18  11:36    


 


Total Protein  7.4 g/dL (6.3-8.3)   11/27/18  11:36    


 


Albumin  3.8 g/dL (3.5-5.0)   11/27/18  11:36    


 


Globulin  3.6 gm/dL (2.2-3.9)   11/27/18  11:36    


 


Albumin/Globulin Ratio  1.1  (1.0-2.1)   11/27/18  11:36    


 


Lipase  68 U/L ()   11/19/18  10:46    


 


Urine Color  Yellow  (YELLOW)   11/19/18  10:58    


 


Urine Clarity  Hazy  (Clear)   11/19/18  10:58    


 


Urine pH  7.0  (5.0-8.0)   11/19/18  10:58    


 


Ur Specific Gravity  1.011  (1.003-1.030)   11/19/18  10:58    


 


Urine Protein  2+ mg/dL (NEGATIVE)  H  11/19/18  10:58    


 


Urine Glucose (UA)  Normal mg/dL (Normal)   11/19/18  10:58    


 


Urine Ketones  Negative mg/dL (NEGATIVE)   11/19/18  10:58    


 


Urine Blood  2+  (NEGATIVE)  H  11/19/18  10:58    


 


Urine Nitrate  Positive  (NEGATIVE)  H  11/19/18  10:58    


 


Urine Bilirubin  Negative  (NEGATIVE)   11/19/18  10:58    


 


Urine Urobilinogen  Normal mg/dL (0.2-1.0)   11/19/18  10:58    


 


Ur Leukocyte Esterase  3+ Ray/uL (Negative)  H  11/19/18  10:58    


 


Urine WBC (Auto)  246 /hpf (0-5)  H  11/19/18  10:58    


 


Urine RBC (Auto)  49 /hpf (0-3)  H  11/19/18  10:58    


 


Urine WBC Clumps (Auto)  Few /hpf (NONE)  H  11/19/18  10:58    


 


Ur Squamous Epith Cells  3 /hpf (0-5)   11/19/18  10:58    


 


Urine Bacteria  Occ  (<OCC)  H  11/19/18  10:58    














- Hospital Course


Hospital Course: 





67 years old male, a patient of Dr Burnham, for whom I was covering, was 

brought to the ED at [facility] (formerly [facility]3] complaining of lower abdominal pain, nause

a, vomiting, chills a few hours prior to arriving to the Hospital. He denies any

bloody urine, any burning or pain on urination. He is known to have an ischemic 

cardiomyopathy, a CAD with multiple PCI's to the left circumflex artery, a 

hypertenion, a BPH, kidney stones  with a left ureteral stent and multiple 

lithotrypsy in the past. He is taking Brilinta for his CAD.


He received a dose of Rocephin 2 g IV in the ED. He was started on Cefepime IV 

by Dr Rodriguez who was covering for Dr Lomas. The patient's condition improved. 

His abdominal pain resolved. A CT scan of the abdomen revealed stone in the left

kidney and left ureter. He was scheduled for a ureteroscopy and laser 

lithotrypsy. Brilinta was held for the surgery and the patient was started on 

Heparin s/c. But he developed hematuria, and Heparin was discontinued. He 

finally underwent a left ureteroscopywith laser lithotrypsy, stone basketing and

left ureteral stent insertion on 11/26/2018. He did well post-operatively. The 

hematuria resolved. Brilinta was resumed. The patient was discharged home on 

11/27/2018 in a stable condition. He will resume all his home meds and started 

Bactrim DS one tablet PO BID for another week. He was told to have a follow up 

with Dr Glory Oakes and Dr Kiley Burnham in a week.





- Date & Time of H&P


Date of H&P: 11/19/18





Discharge Exam





- Head Exam


Head Exam: NORMAL INSPECTION





- Eye Exam


Eye Exam: Normal appearance


Pupil Exam: NORMAL ACCOMODATION





- ENT Exam


ENT Exam: Normal Exam





- Neck Exam


Neck exam: Normal Inspection





- Respiratory Exam


Respiratory Exam: Clear to PA & Lateral, NORMAL BREATHING PATTERN, UNREMARKABLE





- Cardiovascular Exam


Cardiovascular Exam: REGULAR RHYTHM





- GI/Abdominal Exam


GI & Abdominal Exam: Normal Bowel Sounds, Soft





- Rectal Exam


Rectal Exam: Deferred





- Extremities Exam


Extremities exam: normal inspection





- Back Exam


Back exam: NORMAL INSPECTION





- Neurological Exam


Neurological exam: Alert, Normal Gait, Oriented x3





- Psychiatric Exam


Psychiatric exam: Anxious





- Skin


Skin Exam: Dry, Intact, Normal Color, Warm





Discharge Plan





- Discharge Medications


Prescriptions: 


Sulfamethoxazole/Trimethoprim [Bactrim DS Tab] 1 tab PO Q12H 7 Days #14 tab





- Follow Up Plan


Condition: STABLE


Disposition: HOME/ ROUTINE


Instructions:  Sulfamethoxazole and Trimethoprim, Urinary Tract Infection in Men

(DC)


Additional Instructions: 


Discharge home today. F/U with dr Oakes and Dr Burnham in one week. Resume 

Brilanta 90 mg PO BID and take Bactrim DS one tablet q 12h for one week.


Referrals: 


Jules Spivey MD [Staff Provider] - 


Glory Oakes MD [Staff Provider] -

## 2019-01-14 ENCOUNTER — HOSPITAL ENCOUNTER (INPATIENT)
Dept: HOSPITAL 31 - C.ER | Age: 69
LOS: 8 days | Discharge: HOME | DRG: 690 | End: 2019-01-22
Attending: LEGAL MEDICINE | Admitting: LEGAL MEDICINE
Payer: MEDICARE

## 2019-01-14 VITALS — BODY MASS INDEX: 27 KG/M2

## 2019-01-14 DIAGNOSIS — I25.2: ICD-10-CM

## 2019-01-14 DIAGNOSIS — N13.6: Primary | ICD-10-CM

## 2019-01-14 DIAGNOSIS — N18.9: ICD-10-CM

## 2019-01-14 DIAGNOSIS — B37.49: ICD-10-CM

## 2019-01-14 DIAGNOSIS — I50.9: ICD-10-CM

## 2019-01-14 DIAGNOSIS — E78.00: ICD-10-CM

## 2019-01-14 DIAGNOSIS — Z88.0: ICD-10-CM

## 2019-01-14 DIAGNOSIS — K59.00: ICD-10-CM

## 2019-01-14 DIAGNOSIS — H91.90: ICD-10-CM

## 2019-01-14 DIAGNOSIS — D72.820: ICD-10-CM

## 2019-01-14 DIAGNOSIS — Z87.440: ICD-10-CM

## 2019-01-14 DIAGNOSIS — Z87.01: ICD-10-CM

## 2019-01-14 DIAGNOSIS — D50.0: ICD-10-CM

## 2019-01-14 DIAGNOSIS — E78.5: ICD-10-CM

## 2019-01-14 DIAGNOSIS — Z95.5: ICD-10-CM

## 2019-01-14 DIAGNOSIS — D62: ICD-10-CM

## 2019-01-14 DIAGNOSIS — E11.22: ICD-10-CM

## 2019-01-14 DIAGNOSIS — R31.9: ICD-10-CM

## 2019-01-14 DIAGNOSIS — D72.825: ICD-10-CM

## 2019-01-14 DIAGNOSIS — H40.9: ICD-10-CM

## 2019-01-14 DIAGNOSIS — N40.0: ICD-10-CM

## 2019-01-14 DIAGNOSIS — I48.91: ICD-10-CM

## 2019-01-14 DIAGNOSIS — K43.9: ICD-10-CM

## 2019-01-14 DIAGNOSIS — Z87.442: ICD-10-CM

## 2019-01-14 DIAGNOSIS — Z87.891: ICD-10-CM

## 2019-01-14 DIAGNOSIS — I25.10: ICD-10-CM

## 2019-01-14 DIAGNOSIS — I13.0: ICD-10-CM

## 2019-01-14 DIAGNOSIS — N28.1: ICD-10-CM

## 2019-01-14 LAB
ALBUMIN SERPL-MCNC: 4.3 G/DL (ref 3.5–5)
ALBUMIN/GLOB SERPL: 1.2 {RATIO} (ref 1–2.1)
ALT SERPL-CCNC: 24 U/L (ref 21–72)
AST SERPL-CCNC: 25 U/L (ref 17–59)
BACTERIA #/AREA URNS HPF: (no result) /[HPF]
BASOPHILS # BLD AUTO: 0 K/UL (ref 0–0.2)
BASOPHILS NFR BLD: 0.1 % (ref 0–2)
BILIRUB UR-MCNC: NEGATIVE MG/DL
BUN SERPL-MCNC: 13 MG/DL (ref 9–20)
CALCIUM SERPL-MCNC: 9 MG/DL (ref 8.6–10.4)
EOSINOPHIL # BLD AUTO: 0 K/UL (ref 0–0.7)
EOSINOPHIL NFR BLD: 0 % (ref 0–4)
ERYTHROCYTE [DISTWIDTH] IN BLOOD BY AUTOMATED COUNT: 15.8 % (ref 11.5–14.5)
GFR NON-AFRICAN AMERICAN: 55
GLUCOSE UR STRIP-MCNC: NORMAL MG/DL
HGB BLD-MCNC: 11.9 G/DL (ref 12–18)
HYPOCHROMIC: SLIGHT
LEUKOCYTE ESTERASE UR-ACNC: (no result) LEU/UL
LIPASE: 50 U/L (ref 23–300)
LYMPHOCYTE: 8 % (ref 20–40)
LYMPHOCYTES # BLD AUTO: 0.8 K/UL (ref 1–4.3)
LYMPHOCYTES NFR BLD AUTO: 7.8 % (ref 20–40)
MCH RBC QN AUTO: 26.9 PG (ref 27–31)
MCHC RBC AUTO-ENTMCNC: 32.1 G/DL (ref 33–37)
MCV RBC AUTO: 83.7 FL (ref 80–94)
MONOCYTE: 12 % (ref 0–10)
MONOCYTES # BLD: 1.3 K/UL (ref 0–0.8)
MONOCYTES NFR BLD: 12.1 % (ref 0–10)
NEUTROPHILS # BLD: 8.4 K/UL (ref 1.8–7)
NEUTROPHILS NFR BLD AUTO: 80 % (ref 50–75)
NEUTROPHILS NFR BLD AUTO: 80 % (ref 50–75)
NRBC BLD AUTO-RTO: 0 % (ref 0–2)
PH UR STRIP: 5 [PH] (ref 5–8)
PLATELET # BLD EST: NORMAL 10*3/UL
PLATELET # BLD: 219 K/UL (ref 130–400)
PMV BLD AUTO: 9 FL (ref 7.2–11.7)
PROT UR STRIP-MCNC: (no result) MG/DL
RBC # BLD AUTO: 4.43 MIL/UL (ref 4.4–5.9)
RBC # UR STRIP: (no result) /UL
SP GR UR STRIP: 1.02 (ref 1–1.03)
SQUAMOUS EPITHIAL: 66 /HPF (ref 0–5)
TOTAL CELLS COUNTED BLD: 100
UROBILINOGEN UR-MCNC: NORMAL MG/DL (ref 0.2–1)
WBC # BLD AUTO: 10.5 K/UL (ref 4.8–10.8)
WBC CLUMPS # UR AUTO: (no result) /HPF

## 2019-01-14 NOTE — C.PDOC
History Of Present Illness


68 year old male with multiple medical problems presents to the emergency 

department with complaints of nausea, vomiting, fever, chills, and periumbilical

abdominal pain since Friday 1-11-19. Patient thinks that he might have the flu. 

He denies cough, chest pain, and shortness of breath. 


Time Seen by Provider: 01/14/19 12:13


Chief Complaint (Nursing): Abdominal Pain


History Per: Patient


History/Exam Limitations: no limitations


Onset/Duration Of Symptoms: Days (4)


Current Symptoms Are (Timing): Still Present


Location Of Pain/Discomfort: Periumbilical


Radiation Of Pain To:: None


Quality Of Discomfort: "Pain"


Associated Symptoms: Fever, Chills, Nausea, Vomiting





Past Medical History


Reviewed: Historical Data, Nursing Documentation, Vital Signs


Vital Signs: 





                                Last Vital Signs











Temp  98.1 F   01/14/19 11:54


 


Pulse  94 H  01/14/19 11:54


 


Resp  18   01/14/19 11:54


 


BP  142/75   01/14/19 11:54


 


Pulse Ox  100   01/14/19 11:54














- Medical History


PMH: Anemia (sec to blood loss.), Atrial Fibrillation, Benign Prostatic 

Hyperplasia, Cardia Arrhythmia (Ventricular fibrillations x 2 in the past with 

acute MI's.), CHF, Gastritis, HTN, Hypercholesterolemia, Hyperlipidemia, Kidney 

Stones, Peripheral Edema (not at present), Pneumonia, Chronic Kidney Disease


   Denies: Crohn's Disease, Diverticulitis, Gall Bladder Disease, Pancreatitis


Surgical History: Coronary Stent (X4), Endoscopy





- [facility] Procedures











CARDIOPULM RESUSCITA NOS (10/14/13)


CONTINUOUS INVASIVE MECHANICAL VENTILATION <96 CONSEC HRS (10/14/13)


CONTROL POSTOP PROST HEM (10/14/13)


CORONAR ARTERIOGR-2 CATH (10/14/13)


DESTRUCTION OF PROSTATE, ENDO (02/21/16)


DILATION OF BLADDER WITH INTRALUMINAL DEVICE, ENDO (02/21/16)


DILATION OF L KIDNEY PELVIS WITH INTRALUM DEV, PERC APPROACH (02/21/16)


DILATION OF LEFT URETER WITH INTRALUMINAL DEVICE, ENDO (11/21/18)


DIR ING HERNIA REP-GRAFT (07/19/01)


DRAINAGE OF BLADDER WITH DRAINAGE DEVICE, OPEN APPROACH (02/21/16)


DRAINAGE OF BLADDER, ENDO (08/25/18)


ESOPHAGOGASTRODUODENOSCOPY [EGD] W/CLOSED BIOPSY (01/23/02)


EXC LES SOFT TISSUE NEC (07/19/01)


EXCISION OF BLADDER, ENDO (08/06/18)


EXCISION OF PROSTATE, ENDO (08/06/18)


EXCISION OF STOMACH, ENDO, DIAGN (11/09/17)


EXTIRPATION OF MATTER FROM BLADDER, ENDO (02/21/16)


EXTIRPATION OF MATTER FROM LEFT KIDNEY PELVIS, ENDO (08/25/18)


EXTIRPATION OF MATTER FROM LEFT KIDNEY, ENDO (11/21/18)


EXTIRPATION OF MATTER FROM LEFT URETER, ENDO (11/21/18)


FLUOROSCOPY KIDNEY, URETER, BLADDER, L W L OSM CONTRAST (11/21/18)


FLUOROSCOPY OF LEFT HEART USING LOW OSMOLAR CONTRAST (04/10/16)


FLUOROSCOPY OF LEFT KIDNEY, URETER AND BLADDER (08/25/18)


INSERT ENDOTRACHEAL TUBE (10/14/13)


INSERTION OF ENDOTRACHEAL AIRWAY INTO TRACHEA, VIA OPENING (02/21/16)


INSERTION OF ONE VASCULAR STENT (10/14/13)


INSPECTION OF LARYNX, ENDO (02/21/16)


INSPECTION OF LOWER INTESTINAL TRACT, ENDO (11/09/17)


INSPECTION OF PERITONEAL CAVITY, OPEN APPROACH (02/21/16)


INSPECTION OF TRACHEOBRONCHIAL TREE, ENDO (02/21/16)


INTRODUCTION OF NUTRITIONAL INTO UP GI, VIA OPENING (02/21/16)


INTRODUCTION OF VASOPRESSOR INTO PERIPH VEIN, PERC APPROACH (02/21/16)


LEFT HEART CARDIAC CATH (10/14/13)


LT HEART ANGIOCARDIOGRAM (10/14/13)


MEASURE OF CARDIAC SAMPL & PRESSURE, L HEART, PERC APPROACH (04/10/16)


PACKED CELL TRANSFUSION (10/14/13)


PERCUTANEOUS TRANSLUMINAL CORONARY ANGIOPLASTY [PTCA] (10/14/13)


PROCEDURE ON SINGLE VESSEL (10/14/13)


REMOVAL OF INTRALUMINAL DEVICE FROM URETER, ENDO (11/21/18)


REPAIR BLADDER, OPEN APPROACH (02/21/16)


RESPIRATORY VENTILATION, GREATER THAN 96 CONSECUTIVE HOURS (02/21/16)


RETROGR CYSTOURETHROGRAM (10/14/13)


TRANSFUSE NONAUT FROZEN PLASMA IN PERIPH VEIN, PERC (02/21/16)


TRANSFUSE NONAUT RED BLOOD CELLS IN PERIPH VEIN, PERC (11/09/17)


TRANSURETH BLADD BIOPSY (10/14/13)


TU DESTRUC BLADD LES NEC (10/14/13)








Family History: States: No Known Family Hx





- Social History


Hx Tobacco Use: No


Hx Alcohol Use: No


Hx Substance Use: No





- Immunization History


Hx Tetanus Toxoid Vaccination: No


Hx Influenza Vaccination: Yes


Hx Pneumococcal Vaccination: Yes





Review Of Systems


Constitutional: Positive for: Fever, Chills


Cardiovascular: Negative for: Chest Pain


Respiratory: Negative for: Cough, Shortness of Breath


Gastrointestinal: Positive for: Nausea, Vomiting, Abdominal Pain





Physical Exam





- Physical Exam


Appears: Non-toxic, No Acute Distress


Skin: Normal Color, Warm, Dry


Head: Atraumatic, Normacephalic


Eye(s): bilateral: Normal Inspection, PERRL, EOMI


Oral Mucosa: Moist


Neck: Normal, Supple


Chest: Symmetrical, No Tenderness


Cardiovascular: Rhythm Regular, No Murmur


Respiratory: Normal Breath Sounds, No Rales, No Rhonchi, No Wheezing


Gastrointestinal/Abdominal: Soft, Tenderness (periumbilical tenderness), No 

Guarding, No Rebound


Neurological/Psych: Oriented x3, Normal Speech, Normal Cognition





ED Course And Treatment





- Laboratory Results


Result Diagrams: 


                                 01/14/19 12:58





                                 01/14/19 12:58


O2 Sat by Pulse Oximetry: 100 (RA)


Pulse Ox Interpretation: Normal





Medical Decision Making


Medical Decision Making: 


Plan:


CT Abdomen and Pelvis


CMP


Lipase


CBC


Zofran 4mg IVP


Urine Culture


Influenza A B 


Urinalysis





1539  discussed with Dr Burnham; will admit pt to her service. given pt's 

allergy to pcn and review of most recent microbiology reports- 8/25 proteus 

mirabilis and 11/18 e-coli, will give iv merrem, sensitive to both bacteria. 





Disposition


Discussed With : Kiley Burnham


Doctor Will See Patient In The: Hospital





- Disposition


Disposition: HOSPITALIZED


Disposition Time: 15:42


Condition: STABLE


Forms:  [facility] (English)





- Clinical Impression


Clinical Impression: 


 Abdominal pain, Urinary tract infection








- PA / NP / Resident Statement


MD/DO has reviewed & agrees with the documentation as recorded.





- Scribe Statement


The provider has reviewed the documentation as recorded by the Scribe (Stefano Luna)


All medical record entries made by the Scribe were at my direction and 

personally dictated by me. I have reviewed the chart and agree that the record 

accurately reflects my personal performance of the history, physical exam, 

medical decision making, and the department course for this patient. I have also

 personally directed, reviewed, and agree with the discharge instructions and 

disposition.

## 2019-01-14 NOTE — CT
PROCEDURE:  CT Abdomen and Pelvis without Oral or IV contrast.



HISTORY:

hx kidney stones with uti



COMPARISON:

CT abdomen pelvis performed 11/19/18



TECHNIQUE:

Contiguous axial images of the abdomen and pelvis. No oral or IV 

contrast administered. Coronal and Sagittal reformats generated and 

reviewed. 



Radiation dose:



Total exam DLP = 967.81 mGy-cm.



This CT exam was performed using one or more of the following dose 

reduction techniques: Automated exposure control, adjustment of the 

mA and/or kV according to patient size, and/or use of iterative 

reconstruction technique.



FINDINGS:

There is limited evaluation of the solid organs without the 

administration of IV contrast.



LOWER THORAX:

No visible consolidation, pleural effusion, or pneumothorax.



Small hiatal hernia/distal esophageal wall thickening. 



LIVER:

Unremarkable unenhanced appearance. 



GALLBLADDER AND BILE DUCTS:

Unremarkable unenhanced appearance. 



PANCREAS:

Unremarkable unenhanced appearance. 



SPLEEN:

Unremarkable unenhanced appearance. 



ADRENALS:

Unremarkable unenhanced appearance. 



KIDNEYS AND URETERS:

No hydronephrosis or obstructing renal calculus. Bilateral 

low-density renal lesions appear compatible with cysts. 

Nonobstructing stable 6 mm left lower pole renal calculus. 

Indeterminate 3 mm hyperdense opacity at the right lower pole 

possibly tiny hemorrhagic cyst or proteinaceous cyst. 3 mm 

nonobstructing right renal calculus. 



BLADDER:

The urinary bladder appears unremarkable.



REPRODUCTIVE:

Enlarged prostate gland measures approximately 6.0 x 5.6 cm. 



APPENDIX:

The appendix appears within normal limits of caliber. No secondary 

signs of acute appendicitis.



BOWEL:

The stomach is nondistended. 



Lack of oral contrast limits evaluation for bowel pathology.   The 

bowel loops appear within normal limits of caliber without evidence 

of intestinal obstruction.



PERITONEUM:

No significant free fluid. No definite free air.



LYMPH NODES:

No bulky lymphadenopathy identified.



VASCULATURE:

Atherosclerotic calcifications of the aorta. No aortic aneurysm. 



BONES:

Degenerative changes.



OTHER FINDINGS:

2 small ventral wall hernias containing fat measuring approximately 

13 mm and 12 mm (series 3, image 86). More inferiorly there is a 

right ventral hernia measuring approximately 4.1 cm in diameter which 

contains nonobstructed bowel. 



IMPRESSION:

Bilateral renal cysts. Too small to characterize right renal 

hypodensity, possibly proteinaceous or hemorrhagic cyst. 

Nonobstructing bilateral renal calculi. No evidence of hydronephrosis.



2 small ventral wall hernias containing fat measuring approximately 

13 mm and 12 mm. More inferiorly there is a right ventral hernia 

measuring approximately 4.1 cm in diameter which contains 

nonobstructed bowel. 



Markedly enlarged prostate gland. Recommend correlation with PSA. 



Additional incidental findings as above.

## 2019-01-14 NOTE — CP.PCM.CON
History of Present Illness





- History of Present Illness


History of Present Illness: 


INFECTIOUS DISEASE CONSULT;


HPI:


68 year old male with multiple medical problems presents to the emergency 

department with complaints of nausea, vomiting, fever, chills, and periumbilical

abdominal pain since Friday 1-11-19. Patient thinks that he might have the flu. 

He denies cough, chest pain, and shortness of breath. 





Patient has extensive  history with recent history of resection of a bladder 

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 

resection of enlarged prostate in August 2018.


Patient also recently underwent cystoscopy with evacuation of clots when he 

presented with gross hematuria on 8/27/19.  Also underwent left ureteral 

renoscopy and laser lithotripsy with stone  basketing/and RTG pyelogram and 

stent insertion.


Patient has history of complicated UTI with Proteus mirabilis during his 

hospital admission of August 2018.


Patient was treated with IV Merrem at that time and tolerated it well.


AS REPORTED PATIENT GOT ONE DOSE OF GENTAMICIN 500MG POST APPROPRIATE BLOOD 

CULTURES AND URINE CULTURES.





INFECTIOUS DISEASE CONSULT REQUESTED BY PMD DR POSADAS  FOR EVALUATION OF 

COMPLICATED UROSEPSIS AND FEVER FOR 4 DAYS.





CT SCAN ABDOMEN/PELVIS W/O BY MOUTH OR iv CONTRAST-SHOWED BILATERAL SMALL RENAL 

CYSTS.  NONOBSTRUCTING BILATERAL RENAL CALCULI, -VE HYDRONEPHROSIS.  mARKEDLY 

ENLARGED PROSTATE.





ALLERGY; PCN.  bUT PATIENT HAS TOLERATED IV MERREM IN AUG,2018 HOSPITALIZATION 

WITHOUT ANY SIDE EFFECTS.


PT. ALSO STATES HE TOLERATES PO AMOXCILLIN GIVEN BY HER PMD.





PMH: Anemia (sec to blood loss.), Atrial Fibrillation, Benign Prostatic 

Hyperplasia, Cardia Arrhythmia (Ventricular fibrillations x 2 in the past with 

acute MI's.), CHF, Gastritis, HTN, Hypercholesterolemia, Hyperlipidemia, Kidney 

Stones, Peripheral Edema (not at present), Pneumonia, Chronic Kidney Disease


   Denies: Crohn's Disease, Diverticulitis, Gall Bladder Disease, Pancreatitis


Surgical History: Coronary Stent (X4), Endoscopy





- 





Family History: States: No Known Family Hx





- Social History


Hx Tobacco Use: No


Hx Alcohol Use: No


Hx Substance Use: No





- Immunization History


Hx Tetanus Toxoid Vaccination: No


Hx Influenza Vaccination: Yes


Hx Pneumococcal Vaccination: Yes





Review of Systems





- Constitutional


Constitutional: Chills, Fever





- EENT


Eyes: absent: Change in Vision


Nose/Mouth/Throat: Dry Mouth.  absent: Mouth Lesions





- Cardiovascular


Cardiovascular: absent: Chest Pain





- Respiratory


Respiratory: absent: Cough, Dyspnea





- Gastrointestinal


Gastrointestinal: Abdominal Pain (MID UMBLICAL).  absent: Constipation, Diarrhea





- Genitourinary


Genitourinary: Freq UTI, Hx Renal/Bladder Calculi, Hx /Renal Surgery





- Neurological


Neurological: absent: Headaches





- Hematologic/Lymphatic


Hematologic: As Per HPI





Past Patient History





- Infectious Disease


Hx of Infectious Diseases: None





- Tetanus Immunizations


Tetanus Immunization: Unknown





- Past Medical History & Family History


Past Medical History?: Yes





- Past Social History


Smoking Status: Former Smoker





- CARDIAC


Hx Atrial Fibrillation: Yes


Hx Cardia Arrhythmia: Yes (Ventricular fibrillations x 2 in the past with acute 

MI's.)


Hx Congestive Heart Failure: Yes


Hx Hypercholesterolemia: Yes


Hx Hypertension: Yes


Hx Peripheral Edema: Yes (not at present)





- PULMONARY


Hx Pneumonia: Yes





- NEUROLOGICAL


Hx Neurological Disorder: No





- HEENT


Hx HEENT Problems: Yes


Hx Deafness: Yes (HEARING AIDS)


Hx Glaucoma: Yes





- RENAL


Hx Chronic Kidney Disease: Yes


Hx Kidney Stones: Yes





- ENDOCRINE/METABOLIC


Hx Endocrine Disorders: Yes (Pre diabetes/no medication)


Hx Diabetes Mellitus Type 2: Yes


Other/Comment: "borderline diabetes"





- HEMATOLOGICAL/ONCOLOGICAL


Hx Anemia: Yes (sec to blood loss.)





- INTEGUMENTARY


Hx Dermatological Problems: No





- MUSCULOSKELETAL/RHEUMATOLOGICAL


Hx Falls: No





- GASTROINTESTINAL


Hx Crohn's Disease: No


Hx Diverticulitis: No


Hx Gall Bladder Disease: No


Hx Gastritis: Yes


Hx Pancreatitis: No





- GENITOURINARY/GYNECOLOGICAL


Hx Genitourinary Disorders: Yes


Hx Bladder Stone: Yes (Obstructing left ureteral stone.)


Hx Hematuria: Yes


Hx Prostate Problems: Yes


Other/Comment: enlarged prostate





- PSYCHIATRIC


Hx Substance Use: No





- SURGICAL HISTORY


Hx Coronary Stent: Yes (X4)





- ANESTHESIA


Hx Anesthesia: Yes


Hx Anesthesia Reactions: No (? HAD MI 2013 AFTER PROSTATE SURGERY-PT ALSO HAD 

BLEEDING)





Meds


Allergies/Adverse Reactions: 


                                    Allergies











Allergy/AdvReac Type Severity Reaction Status Date / Time


 


Penicillins Allergy Unknown HAPPENED Verified 01/14/19 11:54





   AS A  





   CHILD-UNKNOWN  





   REACTION  














- Medications


Medications: 


                               Current Medications





Aspirin (Ecotrin)  81 mg PO DAILY [facility]


Carvedilol (Coreg)  6.25 mg PO BID [facility]


   Last Admin: 01/14/19 17:44 Dose:  6.25 mg


Ferrous Sulfate (Feosol)  325 mg PO BID [facility]


   Last Admin: 01/14/19 17:45 Dose:  325 mg


Finasteride (Proscar)  5 mg PO DAILY [facility]


Furosemide (Lasix)  40 mg PO DAILY [facility]


Gentamicin Sulfate 500 mg/ (Sodium Chloride)  112.5 mls @ 112.5 mls/hr IVPB Q36H

[facility]; Protocol


   Last Admin: 01/14/19 17:44 Dose:  112.5 mls/hr


Losartan Potassium (Cozaar)  50 mg PO DAILY [facility]


Pantoprazole Sodium (Protonix Ec Tab)  40 mg PO DAILY [facility]


Rosuvastatin Calcium (Crestor)  10 mg PO HS [facility]


Tamsulosin HCl (Flomax)  0.4 mg PO DAILY [facility]


Ticagrelor (Brilinta)  90 mg PO BID [facility]


   Last Admin: 01/14/19 17:44 Dose:  90 mg











Physical Exam





- Constitutional


Appears: No Acute Distress





- Head Exam


Head Exam: NORMAL INSPECTION





- Eye Exam


Eye Exam: EOMI, PERRL





- ENT Exam


ENT Exam: Normal Exam, Normal Oropharynx





- Respiratory Exam


Respiratory Exam: Clear to Auscultation Bilateral, NORMAL BREATHING PATTERN





- Cardiovascular Exam


Cardiovascular Exam: REGULAR RHYTHM, +S1, +S2





- GI/Abdominal Exam


GI & Abdominal Exam: Normal Bowel Sounds, Soft, Tenderness (PERIUMBLICAL).  

absent: Guarding





- Extremities Exam


Extremities exam: Positive for: pedal pulses present.  Negative for: calf 

tenderness, pedal edema





- Neurological Exam


Neurological exam: Alert, CN II-XII Intact, Oriented x3, Reflexes Normal





- Skin


Skin Exam: Normal Color, Warm





Results





- Vital Signs


Recent Vital Signs: 


                                Last Vital Signs











Temp  99.1 F   01/14/19 17:44


 


Pulse  94 H  01/14/19 18:59


 


Resp  18   01/14/19 18:59


 


BP  115/71   01/14/19 18:59


 


Pulse Ox  100   01/14/19 18:59














- Labs


Result Diagrams: 


                                 01/14/19 12:58





                                 01/14/19 12:58


Labs: 


                         Laboratory Results - last 24 hr











  01/14/19 01/14/19 01/14/19





  12:35 12:58 12:58


 


WBC   10.5  D 


 


RBC   4.43 


 


Hgb   11.9 L 


 


Hct   37.1 


 


MCV   83.7 


 


MCH   26.9 L 


 


MCHC   32.1 L 


 


RDW   15.8 H 


 


Plt Count   219 


 


MPV   9.0 


 


Neut % (Auto)   80.0 H 


 


Lymph % (Auto)   7.8 L 


 


Mono % (Auto)   12.1 H 


 


Eos % (Auto)   0.0 


 


Baso % (Auto)   0.1 


 


Neut # (Auto)   8.4 H 


 


Lymph # (Auto)   0.8 L 


 


Mono # (Auto)   1.3 H 


 


Eos # (Auto)   0.0 


 


Baso # (Auto)   0.0 


 


Neutrophils % (Manual)   80 H 


 


Lymphocytes % (Manual)   8 L 


 


Monocytes % (Manual)   12 H 


 


Platelet Estimate   Normal 


 


Hypochromasia (manual)   Slight 


 


Sodium   


 


Potassium   


 


Chloride   


 


Carbon Dioxide   


 


Anion Gap   


 


BUN   


 


Creatinine   


 


Est GFR ( Amer)   


 


Est GFR (Non-Af Amer)   


 


Random Glucose   


 


Calcium   


 


Total Bilirubin   


 


AST   


 


ALT   


 


Alkaline Phosphatase   


 


Total Protein   


 


Albumin   


 


Globulin   


 


Albumin/Globulin Ratio   


 


Lipase   


 


Urine Color    Yellow


 


Urine Clarity    Hazy


 


Urine pH    5.0


 


Ur Specific Gravity    1.019


 


Urine Protein    2+ H


 


Urine Glucose (UA)    Normal


 


Urine Ketones    1+ H


 


Urine Blood    2+ H


 


Urine Nitrate    Positive H


 


Urine Bilirubin    Negative


 


Urine Urobilinogen    Normal


 


Ur Leukocyte Esterase    3+ H


 


Urine WBC (Auto)    800 H


 


Urine RBC (Auto)    19 H


 


Urine WBC Clumps (Auto)    Few H


 


Ur Squamous Epith Cells    66 H


 


Urine Bacteria    Few H


 


Influenza Typ A,B (EIA)  Negative for flu a/b  














  01/14/19





  12:58


 


WBC 


 


RBC 


 


Hgb 


 


Hct 


 


MCV 


 


MCH 


 


MCHC 


 


RDW 


 


Plt Count 


 


MPV 


 


Neut % (Auto) 


 


Lymph % (Auto) 


 


Mono % (Auto) 


 


Eos % (Auto) 


 


Baso % (Auto) 


 


Neut # (Auto) 


 


Lymph # (Auto) 


 


Mono # (Auto) 


 


Eos # (Auto) 


 


Baso # (Auto) 


 


Neutrophils % (Manual) 


 


Lymphocytes % (Manual) 


 


Monocytes % (Manual) 


 


Platelet Estimate 


 


Hypochromasia (manual) 


 


Sodium  134


 


Potassium  4.3


 


Chloride  102


 


Carbon Dioxide  24


 


Anion Gap  13


 


BUN  13


 


Creatinine  1.3


 


Est GFR ( Amer)  > 60


 


Est GFR (Non-Af Amer)  55


 


Random Glucose  132 H D


 


Calcium  9.0


 


Total Bilirubin  0.8


 


AST  25


 


ALT  24


 


Alkaline Phosphatase  69


 


Total Protein  7.9


 


Albumin  4.3


 


Globulin  3.7


 


Albumin/Globulin Ratio  1.2


 


Lipase  50


 


Urine Color 


 


Urine Clarity 


 


Urine pH 


 


Ur Specific Gravity 


 


Urine Protein 


 


Urine Glucose (UA) 


 


Urine Ketones 


 


Urine Blood 


 


Urine Nitrate 


 


Urine Bilirubin 


 


Urine Urobilinogen 


 


Ur Leukocyte Esterase 


 


Urine WBC (Auto) 


 


Urine RBC (Auto) 


 


Urine WBC Clumps (Auto) 


 


Ur Squamous Epith Cells 


 


Urine Bacteria 


 


Influenza Typ A,B (EIA) 














- Imaging and Cardiology


  ** CT scan - abdomen/PELVIS


Status: Report reviewed by me





Assessment & Plan


(1) Complicated UTI (urinary tract infection)


Status: Acute   





(2) Abdominal pain


Status: Acute   





(3) Kidney stone


Status: Acute   





(4) BPH (benign prostatic hypertrophy)


Status: Chronic   Priority: Medium   





(5) Diabetes mellitus


Status: Acute   





- Assessment and Plan (Free Text)


Plan: 





PLAN;





PANCULTURE.


UA/URINE CULTURE.


CXR R/O PNEUMONIA.





START IV MERREM 1GM IVPB Q 8HRLY 1/14/19


Patient has tolerated IV  Merrem previously during his hospitalization of 

August, 2018 without any side effects.


Patient got one dose of  gentamicin 500 mg x1 IN ER POST CULTURES.





DC IV GENTAMICIN FOR NOW IN VIEW OF NEPHROTOXICITY/AND OTOXICITY


WILL MONITOR RENAL FUNCTIONS CLOSELY.


AWAIT CULTURES TO ADJUST ANTIBIOTICS.








WILL DISCUSS WITH PMD.

## 2019-01-15 RX ADMIN — PANTOPRAZOLE SODIUM SCH MG: 40 TABLET, DELAYED RELEASE ORAL at 09:46

## 2019-01-15 NOTE — RAD
Date of service: 



01/14/2019



HISTORY:

 r/o pneumonia 



COMPARISON:

Portable chest 11/24/2018. 



FINDINGS:



LUNGS:

No active pulmonary disease.



PLEURA:

No significant pleural effusion identified, no pneumothorax apparent.



CARDIOVASCULAR:

No aortic atherosclerotic calcification present.



Normal cardiac size. No pulmonary vascular congestion. 



OSSEOUS STRUCTURES:

No significant abnormalities.



VISUALIZED UPPER ABDOMEN:

Normal.



OTHER FINDINGS:

None.



IMPRESSION:

No interval acute cardiopulmonary disease appreciated.

## 2019-01-15 NOTE — CP.PCM.HP
History of Present Illness





- History of Present Illness


History of Present Illness: 





                             Admitted this patient yesterday from the ER because

pof a history of chills and fever with periumbilical abdominal pains on 

1/11/2019. 





           His wife called the office on 01/12/2019 asking for Zithromax z ernst 

thinking his symptpms coulbe be due to the Flu. He did not come to the





          office and yesterday he went to the ER because the symptoms were 

getting worst, and he felt very nauseous. His urine showed 800 wbc,





          rbc and bacteria, with normal WBC count , but with neutrophilia.





Present on Admission





- Present on Admission


Any Indicators Present on Admission: No





Review of Systems





- Review of Systems


Systems not reviewed;Unavailable: Acuity of Condition





- Constitutional


Constitutional: Chills, Excessive Sweating, Fever





- Gastrointestinal


Gastrointestinal: Abdominal Pain, Nausea





Past Patient History





- Infectious Disease


Hx of Infectious Diseases: None





- Tetanus Immunizations


Tetanus Immunization: Unknown





- Past Medical History & Family History


Past Medical History?: Yes





- Past Social History


Smoking Status: Former Smoker


Chewing Tobacco Use: No


Cigar Use: No


Alcohol: None


Drugs: Denies


Domestic Violence: Negative





- CARDIAC


Hx Atrial Fibrillation: Yes


Hx Cardia Arrhythmia: Yes (Ventricular fibrillations x 2 in the past with acute 

MI's.)


Hx Congestive Heart Failure: Yes


Hx Heart Attack: Yes


Hx Hypercholesterolemia: Yes


Hx Hypertension: Yes


Hx Peripheral Edema: Yes (not at present)





- PULMONARY


Hx Pneumonia: Yes





- NEUROLOGICAL


Hx Neurological Disorder: No





- HEENT


Hx HEENT Problems: Yes


Hx Deafness: Yes (HEARING AIDS)


Hx Glaucoma: Yes





- RENAL


Hx Chronic Kidney Disease: Yes


Hx Kidney Stones: Yes


Hx Pyelonephritis: Yes





- ENDOCRINE/METABOLIC


Hx Endocrine Disorders: Yes (Pre diabetes/no medication)


Other/Comment: "borderline diabetes"





- HEMATOLOGICAL/ONCOLOGICAL


Hx Anemia: Yes (sec to blood loss.)


Hx Blood Transfusions: Yes


Hx Blood Transfusion Reaction: No





- INTEGUMENTARY


Hx Dermatological Problems: No





- MUSCULOSKELETAL/RHEUMATOLOGICAL


Hx Back Pain: Yes


Hx Falls: No





- GASTROINTESTINAL


Hx Crohn's Disease: No


Hx Diverticulitis: No


Hx Gall Bladder Disease: No


Hx Gastritis: Yes


Hx Pancreatitis: No





- GENITOURINARY/GYNECOLOGICAL


Hx Genitourinary Disorders: Yes


Hx Bladder Stone: Yes (Obstructing left ureteral stone.)


Hx Hematuria: Yes


Hx Prostate Problems: Yes


Hx Urinary Tract Infection: Yes


Other/Comment: enlarged prostate





- PSYCHIATRIC


Hx Psychophysiologic Disorder: No


Hx Substance Use: No





- SURGICAL HISTORY


Hx Surgeries: Yes


Hx Cardiac Catheterization: Yes


Hx Coronary Stent: Yes (X4)





- ANESTHESIA


Hx Anesthesia: Yes


Hx Anesthesia Reactions: No (? HAD MI 2013 AFTER PROSTATE SURGERY-PT ALSO HAD 

BLEEDING)


Has any member of the family had a problem w/ anesthesia?: No





Meds


Allergies/Adverse Reactions: 


                                    Allergies











Allergy/AdvReac Type Severity Reaction Status Date / Time


 


Penicillins Allergy Unknown HAPPENED Verified 01/14/19 11:54





   AS A  





   CHILD-UNKNOWN  





   REACTION  














Physical Exam





- Constitutional


Appears: No Acute Distress





- Head Exam


Head Exam: ATRAUMATIC, NORMAL INSPECTION, NORMOCEPHALIC





- Respiratory Exam


Respiratory Exam: Clear to Auscultation Bilateral, NORMAL BREATHING PATTERN





- Cardiovascular Exam


Cardiovascular Exam: REGULAR RHYTHM, +S1, +S2





- GI/Abdominal Exam


GI & Abdominal Exam: Normal Bowel Sounds, Soft





- Rectal Exam


Rectal Exam: Deferred





- Extremities Exam


Extremities exam: Positive for: full ROM, normal inspection





- Back Exam


Back exam: FULL ROM, NORMAL INSPECTION





- Neurological Exam


Neurological exam: Alert, Oriented x3, Reflexes Normal





- Psychiatric Exam


Psychiatric exam: Normal Affect, Normal Mood





- Skin


Skin Exam: Intact, Normal Color, Warm





Results





- Vital Signs


Recent Vital Signs: 





                                Last Vital Signs











Temp  99.4 F   01/15/19 07:00


 


Pulse  94 H  01/15/19 07:00


 


Resp  20   01/15/19 07:00


 


BP  126/68   01/15/19 09:46


 


Pulse Ox  98   01/15/19 07:00














- Labs


Result Diagrams: 


                                 01/14/19 12:58





                                 01/14/19 12:58


Labs: 





                         Laboratory Results - last 24 hr











  01/14/19 01/14/19 01/14/19





  12:35 12:58 12:58


 


WBC   10.5  D 


 


RBC   4.43 


 


Hgb   11.9 L 


 


Hct   37.1 


 


MCV   83.7 


 


MCH   26.9 L 


 


MCHC   32.1 L 


 


RDW   15.8 H 


 


Plt Count   219 


 


MPV   9.0 


 


Neut % (Auto)   80.0 H 


 


Lymph % (Auto)   7.8 L 


 


Mono % (Auto)   12.1 H 


 


Eos % (Auto)   0.0 


 


Baso % (Auto)   0.1 


 


Neut # (Auto)   8.4 H 


 


Lymph # (Auto)   0.8 L 


 


Mono # (Auto)   1.3 H 


 


Eos # (Auto)   0.0 


 


Baso # (Auto)   0.0 


 


Neutrophils % (Manual)   80 H 


 


Lymphocytes % (Manual)   8 L 


 


Monocytes % (Manual)   12 H 


 


Platelet Estimate   Normal 


 


Hypochromasia (manual)   Slight 


 


Sodium   


 


Potassium   


 


Chloride   


 


Carbon Dioxide   


 


Anion Gap   


 


BUN   


 


Creatinine   


 


Est GFR ( Amer)   


 


Est GFR (Non-Af Amer)   


 


Random Glucose   


 


Calcium   


 


Total Bilirubin   


 


AST   


 


ALT   


 


Alkaline Phosphatase   


 


Total Protein   


 


Albumin   


 


Globulin   


 


Albumin/Globulin Ratio   


 


Lipase   


 


Urine Color    Yellow


 


Urine Clarity    Hazy


 


Urine pH    5.0


 


Ur Specific Gravity    1.019


 


Urine Protein    2+ H


 


Urine Glucose (UA)    Normal


 


Urine Ketones    1+ H


 


Urine Blood    2+ H


 


Urine Nitrate    Positive H


 


Urine Bilirubin    Negative


 


Urine Urobilinogen    Normal


 


Ur Leukocyte Esterase    3+ H


 


Urine WBC (Auto)    800 H


 


Urine RBC (Auto)    19 H


 


Urine WBC Clumps (Auto)    Few H


 


Ur Squamous Epith Cells    66 H


 


Urine Bacteria    Few H


 


Influenza Typ A,B (EIA)  Negative for flu a/b  














  01/14/19





  12:58


 


WBC 


 


RBC 


 


Hgb 


 


Hct 


 


MCV 


 


MCH 


 


MCHC 


 


RDW 


 


Plt Count 


 


MPV 


 


Neut % (Auto) 


 


Lymph % (Auto) 


 


Mono % (Auto) 


 


Eos % (Auto) 


 


Baso % (Auto) 


 


Neut # (Auto) 


 


Lymph # (Auto) 


 


Mono # (Auto) 


 


Eos # (Auto) 


 


Baso # (Auto) 


 


Neutrophils % (Manual) 


 


Lymphocytes % (Manual) 


 


Monocytes % (Manual) 


 


Platelet Estimate 


 


Hypochromasia (manual) 


 


Sodium  134


 


Potassium  4.3


 


Chloride  102


 


Carbon Dioxide  24


 


Anion Gap  13


 


BUN  13


 


Creatinine  1.3


 


Est GFR ( Amer)  > 60


 


Est GFR (Non-Af Amer)  55


 


Random Glucose  132 H D


 


Calcium  9.0


 


Total Bilirubin  0.8


 


AST  25


 


ALT  24


 


Alkaline Phosphatase  69


 


Total Protein  7.9


 


Albumin  4.3


 


Globulin  3.7


 


Albumin/Globulin Ratio  1.2


 


Lipase  50


 


Urine Color 


 


Urine Clarity 


 


Urine pH 


 


Ur Specific Gravity 


 


Urine Protein 


 


Urine Glucose (UA) 


 


Urine Ketones 


 


Urine Blood 


 


Urine Nitrate 


 


Urine Bilirubin 


 


Urine Urobilinogen 


 


Ur Leukocyte Esterase 


 


Urine WBC (Auto) 


 


Urine RBC (Auto) 


 


Urine WBC Clumps (Auto) 


 


Ur Squamous Epith Cells 


 


Urine Bacteria 


 


Influenza Typ A,B (EIA) 














Assessment & Plan


(1) Acute pyelonephritis


Status: Acute   





(2) Abdominal pain


Status: Acute   





(3) Anemia associated with acute blood loss


Status: Chronic   Priority: High   Onset Date: ~11/07/17   





(4) Hypertension


Status: Chronic   





(5) Pre-diabetes


Status: Chronic   





- Assessment and Plan (Free Text)


Assessment: 





                                   Assessement:











              Acute pyelonephritis.





            Bilateral urolithiasis.





            Renal cyst.





            CAD





           CHF in remission





            HTN





             Prediabetes..





            


Plan: 





                                       Plan:











             Continue IV Merrem, Brilinta, Tansulosin, Finesteride.





- Date & Time


Date: 01/15/19


Time: 11:38

## 2019-01-15 NOTE — CP.PCM.PN
Subjective





- Date & Time of Evaluation


Date of Evaluation: 01/15/19


Time of Evaluation: 19:10





- Subjective


Subjective: 





CHIEF COMPLAINTS  TODAY :


AFEBRILE,


C/O PERIUMBILICAL PAIN/AND SUPRAPUBIC DISCOMFORT.





ROS.


HEENT :  N.


Resp :       No  SOB wheezing, cough 


Cardio :     No CP, PND orthopnea 


GI :           +VE ABD. PAIN, NO n/v 


CNS : No headache , focal deficit. 


Musculoskel :  N


Ext. : Pedal pulses intact, no edema or calf pain 


Derm :        N


Psych :     N. 











PE.


Pt. is alert awake in no distress.


V.S  As noted in the chart 


Head ,ear nose,throat and eyes : Normal.


Neck : Supple with normal carotids.


Lungs: Clear air entry.


Heart : S1 & S2 normal . . No murmur. S4 + 


Abd : SOFT , MILD TOMASA-UMBLICAL DISCOMFORT, with normal bowel sounds.


Neuro : Moves all ext. with no localized deficit.


Ext : No edema with intact pulses. Neg. calf tenderness 


Derm : No rashes or decubitus ulcer.





Radiology/Labs .


BLOOD CULTURES -VE X 24HRS


URINE CULTUR +VE GNR.











Objective





- Vital Signs/Intake and Output


Vital Signs (last 24 hours): 


                                        











Temp Pulse Resp BP Pulse Ox


 


 98.2 F   88   20   106/66   99 


 


 01/15/19 16:00  01/15/19 16:00  01/15/19 16:00  01/15/19 16:00  01/15/19 16:00











- Medications


Medications: 


                               Current Medications





Aspirin (Ecotrin)  81 mg PO DAILY [facility]


   Last Admin: 01/15/19 09:46 Dose:  81 mg


Carvedilol (Coreg)  6.25 mg PO BID [facility]


   Last Admin: 01/15/19 17:05 Dose:  6.25 mg


Ferrous Sulfate (Feosol)  325 mg PO BID [facility]


   Last Admin: 01/15/19 17:04 Dose:  325 mg


Finasteride (Proscar)  5 mg PO DAILY [facility]


   Last Admin: 01/15/19 09:46 Dose:  5 mg


Furosemide (Lasix)  40 mg PO DAILY [facility]


   Last Admin: 01/15/19 09:46 Dose:  40 mg


Heparin Sodium (Porcine) (Heparin)  5,000 units SC Q12 [facility]


   Last Admin: 01/15/19 09:46 Dose:  5,000 units


Meropenem 1 gm/ Sodium (Chloride)  100 mls @ 100 mls/hr IVPB Q8H [facility]; Protocol


   Last Admin: 01/15/19 15:45 Dose:  100 mls/hr


Losartan Potassium (Cozaar)  50 mg PO DAILY [facility]


   Last Admin: 01/15/19 09:50 Dose:  50 mg


Ondansetron HCl (Zofran Tab)  4 mg PO Q6 PRN


   PRN Reason: Nausea/Vomiting


Pantoprazole Sodium (Protonix Ec Tab)  40 mg PO DAILY [facility]


   Last Admin: 01/15/19 09:46 Dose:  40 mg


Rosuvastatin Calcium (Crestor)  10 mg PO [facility]


Tamsulosin HCl (Flomax)  0.4 mg PO DAILY [facility]


   Last Admin: 01/15/19 09:46 Dose:  0.4 mg


Ticagrelor (Brilinta)  90 mg PO BID [facility]


   Last Admin: 01/15/19 17:05 Dose:  90 mg











- Labs


Labs: 


                                        





                                 01/14/19 12:58 





                                 01/14/19 12:58 











Assessment and Plan


(1) Complicated UTI (urinary tract infection)


Status: Acute   





(2) Abdominal pain


Status: Acute   





(3) Kidney stone


Status: Acute   





(4) BPH (benign prostatic hypertrophy)


Status: Chronic   





(5) Diabetes mellitus


Status: Acute   





- Assessment and Plan (Free Text)


Plan: 





CONTINUE IV MERREM 1GM IVPB Q 8HRLY 1/14/19


Patient has tolerated IV  Merrem previously during his hospitalization of 

August, 2018 without any side effects.PATIENT ALSO REPORTS


HE TOLERATED BY MOUTH AMOXICILLIN AS OUTPATIENT WHEN GIVEN BY HIS PRIVATE MD FOR

UPPER RESPIRATORY TRACT INFECTION..GOT HISTORY FROM PATIENT IN DETAIL.





PATIENT IS NOT ALLERGIC TO PENICILLIN OR CEPHALOSPORINS.  





DISCUSSED WITH PHARMACY MS KAY AT   TO  REMOVE HIS ALLERGY FROM RECORDS FOR 

PENICILLIN.WILL ALSO DISCUSS WITH DR BECERRA TO TO PUT IT IN HER RECORDS/AND 

CALL PHARMACY  ALSO TO RECTIFY THE RECORDS.


PATIENT WILL OTHERWISE MISS OPPORTUNITY OF GETTING SUCH A USEFUL DRUG





Patient got one dose of  gentamicin 500 mg x1 IN ER POST CULTURES.





OFF IV GENTAMICIN FOR NOW IN VIEW OF NEPHROTOXICITY/AND OTOXICITY





WILL MONITOR RENAL FUNCTIONS CLOSELY.


 -EVALUATION IN PROGRESS.





AWAIT FINAL  CULTURES TO ADJUST ANTIBIOTICS.

## 2019-01-16 LAB
ALBUMIN SERPL-MCNC: 3.5 G/DL (ref 3.5–5)
ALBUMIN/GLOB SERPL: 1.1 {RATIO} (ref 1–2.1)
ALT SERPL-CCNC: 19 U/L (ref 21–72)
AST SERPL-CCNC: 31 U/L (ref 17–59)
BASOPHILS # BLD AUTO: 0 K/UL (ref 0–0.2)
BASOPHILS NFR BLD: 0.4 % (ref 0–2)
BILIRUB DIRECT SERPL-MCNC: 0.4 MG/DL (ref 0–0.4)
BUN SERPL-MCNC: 23 MG/DL (ref 9–20)
CALCIUM SERPL-MCNC: 8.4 MG/DL (ref 8.6–10.4)
EOSINOPHIL # BLD AUTO: 0 K/UL (ref 0–0.7)
EOSINOPHIL NFR BLD: 0.5 % (ref 0–4)
ERYTHROCYTE [DISTWIDTH] IN BLOOD BY AUTOMATED COUNT: 15.8 % (ref 11.5–14.5)
GFR NON-AFRICAN AMERICAN: 40
HGB BLD-MCNC: 10.6 G/DL (ref 12–18)
LYMPHOCYTES # BLD AUTO: 0.9 K/UL (ref 1–4.3)
LYMPHOCYTES NFR BLD AUTO: 13.7 % (ref 20–40)
MCH RBC QN AUTO: 26.9 PG (ref 27–31)
MCHC RBC AUTO-ENTMCNC: 32.4 G/DL (ref 33–37)
MCV RBC AUTO: 83 FL (ref 80–94)
MONOCYTES # BLD: 1 K/UL (ref 0–0.8)
MONOCYTES NFR BLD: 15 % (ref 0–10)
NEUTROPHILS # BLD: 4.5 K/UL (ref 1.8–7)
NEUTROPHILS NFR BLD AUTO: 70.4 % (ref 50–75)
NRBC BLD AUTO-RTO: 0 % (ref 0–2)
PLATELET # BLD: 213 K/UL (ref 130–400)
PMV BLD AUTO: 9.3 FL (ref 7.2–11.7)
RBC # BLD AUTO: 3.93 MIL/UL (ref 4.4–5.9)
WBC # BLD AUTO: 6.3 K/UL (ref 4.8–10.8)

## 2019-01-16 RX ADMIN — PANTOPRAZOLE SODIUM SCH MG: 40 TABLET, DELAYED RELEASE ORAL at 09:32

## 2019-01-16 NOTE — CP.PCM.PN
Subjective





- Date & Time of Evaluation


Date of Evaluation: 01/16/19


Time of Evaluation: 11:48





- Subjective


Subjective: 





CHIEF COMPLAINTS  TODAY :


AFEBRILE,


C/O PERIUMBILICAL PAIN/


AND SUPRAPUBIC DISCOMFORT.


 seen by / AND SURGERY





ROS.


HEENT :  N.


Resp :       No  SOB wheezing, cough 


Cardio :     No CP, PND orthopnea 


GI :           +VE ABD. PAIN, NO n/v 


CNS : No headache , focal deficit. 


Musculoskel :  N


Ext. : Pedal pulses intact, no edema or calf pain 


Derm :        N


Psych :     N. 





PE.


Pt. is alert awake in no distress.


V.S  As noted in the chart 


Head ,ear nose,throat and eyes : Normal.


Neck : Supple with normal carotids.


Lungs: Clear air entry.


Heart : S1 & S2 normal . . No murmur. S4 + 


Abd : SOFT , MILD TOMASA-UMBLICAL DISCOMFORT, with normal bowel sounds.


Neuro : Moves all ext. with no localized deficit.


Ext : No edema with intact pulses. Neg. calf tenderness 


Derm : No rashes or decubitus ulcer.





Radiology/Labs .


BLOOD CULTURES -VE X 24HRS


URINE CULTUR +VE  E. COLI S  MERREM R- CIPRO





ABD US/BLADDER US NOTED


MULTIPLE CYSTB/L KIDNEY





CT SCAN ABD /PELVIS +VE LARGE RT. VENTRAL HERNIA  - 4.1CM WHICH CONTAINS NON 

OBST BOWEL.


MARKEDLY ENLARGED PROSTATE (SEE FULL REPORT )





Objective





- Vital Signs/Intake and Output


Vital Signs (last 24 hours): 


                                        











Temp Pulse Resp BP Pulse Ox


 


 98.7 F   88   20   120/64   98 


 


 01/16/19 08:33  01/16/19 08:33  01/16/19 08:33  01/16/19 09:32  01/16/19 08:35








Intake and Output: 


                                        











 01/16/19 01/16/19





 06:59 18:59


 


Intake Total 400 


 


Balance 400 














- Medications


Medications: 


                               Current Medications





Aspirin (Ecotrin)  81 mg PO DAILY [facility]


   Last Admin: 01/16/19 09:32 Dose:  81 mg


Carvedilol (Coreg)  6.25 mg PO BID [facility]


   Last Admin: 01/16/19 09:32 Dose:  6.25 mg


Ferrous Sulfate (Feosol)  325 mg PO BID [facility]


   Last Admin: 01/15/19 17:04 Dose:  325 mg


Finasteride (Proscar)  5 mg PO DAILY [facility]


   Last Admin: 01/16/19 09:32 Dose:  5 mg


Furosemide (Lasix)  40 mg PO DAILY [facility]


   Last Admin: 01/16/19 09:32 Dose:  40 mg


Meropenem 1 gm/ Sodium (Chloride)  100 mls @ 100 mls/hr IVPB Q8H [facility]; Protocol


   Last Admin: 01/16/19 06:24 Dose:  100 mls/hr


Losartan Potassium (Cozaar)  50 mg PO DAILY [facility]


   Last Admin: 01/15/19 09:50 Dose:  50 mg


Ondansetron HCl (Zofran Tab)  4 mg PO Q6 PRN


   PRN Reason: Nausea/Vomiting


Pantoprazole Sodium (Protonix Ec Tab)  40 mg PO DAILY [facility]


   Last Admin: 01/16/19 09:32 Dose:  40 mg


Rosuvastatin Calcium (Crestor)  10 mg PO HS [facility]


   Last Admin: 01/15/19 21:42 Dose:  10 mg


Tamsulosin HCl (Flomax)  0.4 mg PO DAILY [facility]


   Last Admin: 01/16/19 09:32 Dose:  0.4 mg


Ticagrelor (Brilinta)  90 mg PO BID [facility]


   Last Admin: 01/16/19 09:32 Dose:  90 mg











- Labs


Labs: 


                                        





                                 01/16/19 07:36 





                                 01/16/19 07:36 











Assessment and Plan


(1) Complicated UTI (urinary tract infection)


Status: Acute   





(2) Abdominal pain


Status: Acute   





(3) Kidney stone


Status: Acute   





(4) BPH (benign prostatic hypertrophy)


Status: Chronic   





(5) Diabetes mellitus


Status: Acute   





- Assessment and Plan (Free Text)


Plan: 





PLAN 


CONTINUE iv ANTIBIOTICS.


PATIENT TOLERATING iv mERREM 1 G EVERY 8 HOURLY 1/14/19





F/U RENAL FUNCTIONS CLOSELY.


FOLLOW-UP REPEAT ua URINE CULTURE CLEAN-CATCH.





ENCOURAGED PATIENT TO DRINK WATER.

## 2019-01-16 NOTE — CP.PCM.CON
History of Present Illness





- History of Present Illness


History of Present Illness: 





Surgery Consult Note for Dr. Amos





68 M w/ PMhx: of HTN, DM, heart disease, enlarged prostate presents to ED for 

abdominal pain. Patient states pain began Friday and was increasing in intensity

so he came to the ED. Patient states pain is intermittent 5/10 at its max, non 

radiating, primarily located umbilical area. Patient states he spits up white 

phlegm but denies any nausea, vomiting, diarrha, constipation. Patient admits to

being able to having flatulence and having normal bowel movements. Patient 

denies any chest pain, SOB, headaches,vision changes, hematuria, dysuria. 

Surgery consulted for abdominal pain.





PMHx: HTN, DM, heart disease, enlarged prostate


PSHx: Cystoscopy, bladder rupture 


Allergies: NKDA


SHx: denies smoking, ETOH, drug use








Review of Systems





- Review of Systems


All systems: reviewed and no additional remarkable complaints except





- Constitutional


Constitutional: As Per HPI





Past Patient History





- Infectious Disease


Hx of Infectious Diseases: None





- Tetanus Immunizations


Tetanus Immunization: Unknown





- Past Medical History & Family History


Past Medical History?: Yes





- Past Social History


Smoking Status: Former Smoker





- CARDIAC


Hx Atrial Fibrillation: Yes


Hx Cardia Arrhythmia: Yes (Ventricular fibrillations x 2 in the past with acute 

MI's.)


Hx Congestive Heart Failure: Yes


Hx Hypercholesterolemia: Yes


Hx Hypertension: Yes


Hx Peripheral Edema: Yes (not at present)





- PULMONARY


Hx Pneumonia: Yes





- NEUROLOGICAL


Hx Neurological Disorder: No





- HEENT


Hx HEENT Problems: Yes


Hx Deafness: Yes (HEARING AIDS)


Hx Glaucoma: Yes





- RENAL


Hx Chronic Kidney Disease: Yes


Hx Kidney Stones: Yes





- ENDOCRINE/METABOLIC


Hx Endocrine Disorders: Yes (Pre diabetes/no medication)


Hx Diabetes Mellitus Type 2: Yes


Other/Comment: "borderline diabetes"





- HEMATOLOGICAL/ONCOLOGICAL


Hx Anemia: Yes (sec to blood loss.)





- INTEGUMENTARY


Hx Dermatological Problems: No





- MUSCULOSKELETAL/RHEUMATOLOGICAL


Hx Falls: No





- GASTROINTESTINAL


Hx Crohn's Disease: No


Hx Diverticulitis: No


Hx Gall Bladder Disease: No


Hx Gastritis: Yes


Hx Pancreatitis: No





- GENITOURINARY/GYNECOLOGICAL


Hx Genitourinary Disorders: Yes


Hx Bladder Stone: Yes (Obstructing left ureteral stone.)


Hx Hematuria: Yes


Hx Prostate Problems: Yes


Other/Comment: enlarged prostate





- PSYCHIATRIC


Hx Substance Use: No





- SURGICAL HISTORY


Hx Coronary Stent: Yes (X4)





- ANESTHESIA


Hx Anesthesia: Yes


Hx Anesthesia Reactions: No (? HAD MI 2013 AFTER PROSTATE SURGERY-PT ALSO HAD 

BLEEDING)





Meds


Allergies/Adverse Reactions: 


                                    Allergies











Allergy/AdvReac Type Severity Reaction Status Date / Time


 


No Known Allergies Allergy   Verified 01/15/19 22:54














- Medications


Medications: 


                               Current Medications





Aspirin (Ecotrin)  81 mg PO DAILY [facility]


   Last Admin: 01/16/19 09:32 Dose:  81 mg


Carvedilol (Coreg)  6.25 mg PO BID [facility]


   Last Admin: 01/16/19 09:32 Dose:  6.25 mg


Ferrous Sulfate (Feosol)  325 mg PO BID [facility]


   Last Admin: 01/16/19 12:34 Dose:  325 mg


Finasteride (Proscar)  5 mg PO DAILY [facility]


   Last Admin: 01/16/19 09:32 Dose:  5 mg


Furosemide (Lasix)  40 mg PO DAILY [facility]


   Last Admin: 01/16/19 09:32 Dose:  40 mg


Meropenem 1 gm/ Sodium (Chloride)  100 mls @ 100 mls/hr IVPB Q8H [facility]; Protocol


   Last Admin: 01/16/19 14:15 Dose:  100 mls/hr


Losartan Potassium (Cozaar)  50 mg PO DAILY [facility]


   Last Admin: 01/16/19 12:33 Dose:  50 mg


Ondansetron HCl (Zofran Tab)  4 mg PO Q6 PRN


   PRN Reason: Nausea/Vomiting


Pantoprazole Sodium (Protonix Ec Tab)  40 mg PO DAILY [facility]


   Last Admin: 01/16/19 09:32 Dose:  40 mg


Rosuvastatin Calcium (Crestor)  10 mg PO HS [facility]


   Last Admin: 01/15/19 21:42 Dose:  10 mg


Tamsulosin HCl (Flomax)  0.4 mg PO DAILY [facility]


   Last Admin: 01/16/19 09:32 Dose:  0.4 mg


Ticagrelor (Brilinta)  90 mg PO BID [facility]


   Last Admin: 01/16/19 09:32 Dose:  90 mg











Physical Exam





- Constitutional


Appears: Non-toxic, No Acute Distress





- Head Exam


Head Exam: NORMAL INSPECTION, NORMOCEPHALIC





- Eye Exam


Eye Exam: Normal appearance





- ENT Exam


ENT Exam: Mucous Membranes Moist





- Respiratory Exam


Respiratory Exam: Clear to Auscultation Bilateral, NORMAL BREATHING PATTERN.  

absent: Rales, Rhonchi, Wheezes





- Cardiovascular Exam


Cardiovascular Exam: +S1, +S2.  absent: Systolic Murmur





- GI/Abdominal Exam


GI & Abdominal Exam: Normal Bowel Sounds, Soft


Additional comments: 


guarding, tenderness on palpation in umbilical and per umbilical area 





- Extremities Exam


Extremities exam: Positive for: full ROM.  Negative for: calf tenderness, pedal 

edema





- Back Exam


Back exam: absent: CVA tenderness (L), CVA tenderness (R)





- Neurological Exam


Neurological exam: Alert, Oriented x3





- Psychiatric Exam


Psychiatric exam: Normal Affect, Normal Mood





- Skin


Skin Exam: Dry, Intact, Normal Color, Warm





Results





- Vital Signs


Recent Vital Signs: 


                                Last Vital Signs











Temp  97.8 F   01/16/19 15:20


 


Pulse  94 H  01/16/19 15:20


 


Resp  20   01/16/19 15:20


 


BP  129/80   01/16/19 15:20


 


Pulse Ox  100   01/16/19 15:20














- Labs


Result Diagrams: 


                                 01/17/19 07:48





                                 01/17/19 07:48


Labs: 


                         Laboratory Results - last 24 hr











  01/16/19 01/16/19





  07:36 07:36


 


WBC  6.3 


 


RBC  3.93 L 


 


Hgb  10.6 L 


 


Hct  32.6 L 


 


MCV  83.0 


 


MCH  26.9 L 


 


MCHC  32.4 L 


 


RDW  15.8 H 


 


Plt Count  213 


 


MPV  9.3 


 


Neut % (Auto)  70.4 


 


Lymph % (Auto)  13.7 L 


 


Mono % (Auto)  15.0 H 


 


Eos % (Auto)  0.5 


 


Baso % (Auto)  0.4 


 


Neut # (Auto)  4.5 


 


Lymph # (Auto)  0.9 L 


 


Mono # (Auto)  1.0 H 


 


Eos # (Auto)  0.0 


 


Baso # (Auto)  0.0 


 


Sodium   136


 


Potassium   3.9


 


Chloride   102


 


Carbon Dioxide   25


 


Anion Gap   13


 


BUN   23 H


 


Creatinine   1.7 H


 


Est GFR ( Amer)   49


 


Est GFR (Non-Af Amer)   40


 


Random Glucose   97  D


 


Calcium   8.4 L


 


Total Bilirubin   0.5


 


Direct Bilirubin   0.4


 


AST   31


 


ALT   19 L D


 


Alkaline Phosphatase   61


 


Total Protein   6.9


 


Albumin   3.5


 


Globulin   3.3


 


Albumin/Globulin Ratio   1.1














Assessment & Plan





- Assessment and Plan (Free Text)


Assessment: 


68 M presents to ED for abdominal pain, per CT patient has ventral hernias, 1 w/

non obstructed bowel 





Plan: 


- No surgical intervention at this point


- F/u outpatient 


- medicine to f/u xray





Further recs per Dr. Bette Velarde, PGY1


Surgery

## 2019-01-16 NOTE — CP.PCM.PN
Subjective





- Date & Time of Evaluation


Date of Evaluation: 01/16/19


Time of Evaluation: 11:30





- Subjective


Subjective: 





                       Patient if afebrile. Still C/O pf periumbilical pains. No

Nausea noted today. Urine culture back and identified as C>COLI sensitive to 

Merrem.


 BUN and crea are both elevated today. Awaiting Dr. Oakes to see patient, for 

CAT Scan of abdomen showed non-obstructing nephrolithiasis again. U/S


of abdomen done , but no reading on the chart was given so far.





Objective





- Vital Signs/Intake and Output


Vital Signs (last 24 hours): 


                                        











Temp Pulse Resp BP Pulse Ox


 


 98.7 F   88   20   120/64   98 


 


 01/16/19 08:33  01/16/19 08:33  01/16/19 08:33  01/16/19 09:32  01/16/19 08:35








Intake and Output: 


                                        











 01/16/19 01/16/19





 06:59 18:59


 


Intake Total 400 


 


Balance 400 














- Medications


Medications: 


                               Current Medications





Aspirin (Ecotrin)  81 mg PO DAILY [facility]


   Last Admin: 01/16/19 09:32 Dose:  81 mg


Carvedilol (Coreg)  6.25 mg PO BID [facility]


   Last Admin: 01/16/19 09:32 Dose:  6.25 mg


Ferrous Sulfate (Feosol)  325 mg PO BID [facility]


   Last Admin: 01/15/19 17:04 Dose:  325 mg


Finasteride (Proscar)  5 mg PO DAILY [facility]


   Last Admin: 01/16/19 09:32 Dose:  5 mg


Furosemide (Lasix)  40 mg PO DAILY [facility]


   Last Admin: 01/16/19 09:32 Dose:  40 mg


Meropenem 1 gm/ Sodium (Chloride)  100 mls @ 100 mls/hr IVPB Q8H [facility]; Protocol


   Last Admin: 01/16/19 06:24 Dose:  100 mls/hr


Losartan Potassium (Cozaar)  50 mg PO DAILY [facility]


   Last Admin: 01/15/19 09:50 Dose:  50 mg


Ondansetron HCl (Zofran Tab)  4 mg PO Q6 PRN


   PRN Reason: Nausea/Vomiting


Pantoprazole Sodium (Protonix Ec Tab)  40 mg PO DAILY [facility]


   Last Admin: 01/16/19 09:32 Dose:  40 mg


Rosuvastatin Calcium (Crestor)  10 mg PO HS [facility]


   Last Admin: 01/15/19 21:42 Dose:  10 mg


Tamsulosin HCl (Flomax)  0.4 mg PO DAILY [facility]


   Last Admin: 01/16/19 09:32 Dose:  0.4 mg


Ticagrelor (Brilinta)  90 mg PO BID [facility]


   Last Admin: 01/16/19 09:32 Dose:  90 mg











- Labs


Labs: 


                                        





                                 01/16/19 07:36 





                                 01/16/19 07:36 











- Constitutional


Appears: Well, Non-toxic





- Head Exam


Head Exam: ATRAUMATIC, NORMAL INSPECTION, NORMOCEPHALIC





- Eye Exam


Eye Exam: EOMI, Normal appearance


Pupil Exam: PERRL





- ENT Exam


ENT Exam: Mucous Membranes Moist





- Neck Exam


Neck Exam: Full ROM





- Respiratory Exam


Respiratory Exam: Clear to Ausculation Bilateral, NORMAL BREATHING PATTERN





- Cardiovascular Exam


Cardiovascular Exam: +S1, +S2





- GI/Abdominal Exam


GI & Abdominal Exam: Soft, Tenderness





- Rectal Exam


Rectal Exam: Deferred





- Extremities Exam


Extremities Exam: Full ROM, Normal Inspection





- Back Exam


Back Exam: Full ROM, NORMAL INSPECTION





- Neurological Exam


Neurological Exam: Alert, Awake, Oriented x3





- Psychiatric Exam


Psychiatric exam: Normal Affect, Normal Mood





- Skin


Skin Exam: Intact, Normal Color





Assessment and Plan


(1) Acute pyelonephritis


Status: Acute   





(2) Abdominal pain


Status: Acute   





(3) Anemia associated with acute blood loss


Status: Chronic   





(4) Hypertension


Status: Chronic   





(5) Pre-diabetes


Status: Chronic   





- Assessment and Plan (Free Text)


Assessment: 





                      Assessment:








       Acute pyelonephritis.





     Non-obstructing nephrolithiasis., bilateral.





     CAD, with CHF.





      HTN





     BPH





     Anemia sec to blood loss.





     Prediabetes.


Plan: 





                           Plan:





       Check abdominal US.





     Continue IV Merrem.





      Continue PO Brilinta, and ASA.





   Continue Tansulosin, and Finesteride.

## 2019-01-16 NOTE — CP.PCM.CON
Past Patient History





- Infectious Disease


Hx of Infectious Diseases: None





- Tetanus Immunizations


Tetanus Immunization: Unknown





- Past Medical History & Family History


Past Medical History?: Yes





- Past Social History


Smoking Status: Former Smoker





- CARDIAC


Hx Atrial Fibrillation: Yes


Hx Cardia Arrhythmia: Yes (Ventricular fibrillations x 2 in the past with acute 

MI's.)


Hx Congestive Heart Failure: Yes


Hx Hypercholesterolemia: Yes


Hx Hypertension: Yes


Hx Peripheral Edema: Yes (not at present)





- PULMONARY


Hx Pneumonia: Yes





- NEUROLOGICAL


Hx Neurological Disorder: No





- HEENT


Hx HEENT Problems: Yes


Hx Deafness: Yes (HEARING AIDS)


Hx Glaucoma: Yes





- RENAL


Hx Chronic Kidney Disease: Yes


Hx Kidney Stones: Yes





- ENDOCRINE/METABOLIC


Hx Endocrine Disorders: Yes (Pre diabetes/no medication)


Hx Diabetes Mellitus Type 2: Yes


Other/Comment: "borderline diabetes"





- HEMATOLOGICAL/ONCOLOGICAL


Hx Anemia: Yes (sec to blood loss.)





- INTEGUMENTARY


Hx Dermatological Problems: No





- MUSCULOSKELETAL/RHEUMATOLOGICAL


Hx Falls: No





- GASTROINTESTINAL


Hx Crohn's Disease: No


Hx Diverticulitis: No


Hx Gall Bladder Disease: No


Hx Gastritis: Yes


Hx Pancreatitis: No





- GENITOURINARY/GYNECOLOGICAL


Hx Genitourinary Disorders: Yes


Hx Bladder Stone: Yes (Obstructing left ureteral stone.)


Hx Hematuria: Yes


Hx Prostate Problems: Yes


Other/Comment: enlarged prostate





- PSYCHIATRIC


Hx Substance Use: No





- SURGICAL HISTORY


Hx Coronary Stent: Yes (X4)





- ANESTHESIA


Hx Anesthesia: Yes


Hx Anesthesia Reactions: No (? HAD MI 2013 AFTER PROSTATE SURGERY-PT ALSO HAD 

BLEEDING)





Meds


Allergies/Adverse Reactions: 


                                    Allergies











Allergy/AdvReac Type Severity Reaction Status Date / Time


 


No Known Allergies Allergy   Verified 01/15/19 22:54














- Medications


Medications: 


                               Current Medications





Aspirin (Ecotrin)  81 mg PO DAILY [facility]


   Last Admin: 01/16/19 09:32 Dose:  81 mg


Carvedilol (Coreg)  6.25 mg PO BID [facility]


   Last Admin: 01/16/19 18:17 Dose:  6.25 mg


Ferrous Sulfate (Feosol)  325 mg PO BID [facility]


   Last Admin: 01/16/19 18:18 Dose:  325 mg


Finasteride (Proscar)  5 mg PO DAILY [facility]


   Last Admin: 01/16/19 09:32 Dose:  5 mg


Furosemide (Lasix)  40 mg PO DAILY [facility]


   Last Admin: 01/16/19 09:32 Dose:  40 mg


Meropenem 1 gm/ Sodium (Chloride)  100 mls @ 100 mls/hr IVPB Q8H [facility]; Protocol


   Last Admin: 01/16/19 14:15 Dose:  100 mls/hr


Losartan Potassium (Cozaar)  50 mg PO DAILY [facility]


   Last Admin: 01/16/19 12:33 Dose:  50 mg


Ondansetron HCl (Zofran Tab)  4 mg PO Q6 PRN


   PRN Reason: Nausea/Vomiting


Pantoprazole Sodium (Protonix Ec Tab)  40 mg PO DAILY [facility]


   Last Admin: 01/16/19 09:32 Dose:  40 mg


Rosuvastatin Calcium (Crestor)  10 mg PO HS [facility]


   Last Admin: 01/15/19 21:42 Dose:  10 mg


Tamsulosin HCl (Flomax)  0.4 mg PO DAILY [facility]


   Last Admin: 01/16/19 09:32 Dose:  0.4 mg


Ticagrelor (Brilinta)  90 mg PO BID [facility]


   Last Admin: 01/16/19 18:17 Dose:  90 mg











Results





- Vital Signs


Recent Vital Signs: 


                                Last Vital Signs











Temp  97.8 F   01/16/19 15:20


 


Pulse  94 H  01/16/19 15:20


 


Resp  20   01/16/19 15:20


 


BP  129/80   01/16/19 15:20


 


Pulse Ox  100   01/16/19 15:20














- Labs


Result Diagrams: 


                                 01/16/19 07:36





                                 01/16/19 07:36


Labs: 


                         Laboratory Results - last 24 hr











  01/16/19 01/16/19





  07:36 07:36


 


WBC  6.3 


 


RBC  3.93 L 


 


Hgb  10.6 L 


 


Hct  32.6 L 


 


MCV  83.0 


 


MCH  26.9 L 


 


MCHC  32.4 L 


 


RDW  15.8 H 


 


Plt Count  213 


 


MPV  9.3 


 


Neut % (Auto)  70.4 


 


Lymph % (Auto)  13.7 L 


 


Mono % (Auto)  15.0 H 


 


Eos % (Auto)  0.5 


 


Baso % (Auto)  0.4 


 


Neut # (Auto)  4.5 


 


Lymph # (Auto)  0.9 L 


 


Mono # (Auto)  1.0 H 


 


Eos # (Auto)  0.0 


 


Baso # (Auto)  0.0 


 


Sodium   136


 


Potassium   3.9


 


Chloride   102


 


Carbon Dioxide   25


 


Anion Gap   13


 


BUN   23 H


 


Creatinine   1.7 H


 


Est GFR ( Amer)   49


 


Est GFR (Non-Af Amer)   40


 


Random Glucose   97  D


 


Calcium   8.4 L


 


Total Bilirubin   0.5


 


Direct Bilirubin   0.4


 


AST   31


 


ALT   19 L D


 


Alkaline Phosphatase   61


 


Total Protein   6.9


 


Albumin   3.5


 


Globulin   3.3


 


Albumin/Globulin Ratio   1.1














Assessment & Plan





- Assessment and Plan (Free Text)


Assessment: 





IMP:


UTI, improving


BPH


Hx of stones


CAD





full note tbd





YS





- Date & Time


Date: 01/16/19


Time: 14:10

## 2019-01-16 NOTE — US
Date of service: 



01/15/2019



HISTORY:

R/O urolithiasis



COMPARISON:

None.



TECHNIQUE:

Sonographic evaluation of the abdomen.



FINDINGS:



LIVER:

Measures  cm.  Heterogeneous echogenicity of the liver parenchyma. No 

mass. No intrahepatic bile duct dilatation.



GALLBLADDER:

Unremarkable. No gallstones.



COMMON BILE DUCT:

Measures  mm. No stones. No dilatation.



PANCREAS:

Unremarkable as visualized. No mass. No ductal dilatation.  Not 

well-visualized. 



RIGHT KIDNEY:

Measures cm. Normal echogenicity. No calculus, mass, or 

hydronephrosis.



LEFT KIDNEY:

Measures cm. Normal echogenicity. No calculus, mass, or 

hydronephrosis.



SPLEEN:

Normal in size and contour. No mass.



AORTA:

No aneurysmal dilatation. 



IVC:

Unremarkable. 



OTHER FINDINGS:

Multiple bilateral renal cysts measuring up to 6.3 centimeters with a 

thin septation in the lower pole the right kidney.  Additional 

septated cyst left kidney measuring 4.0 centimeters.  Additional 

bilateral simple cysts.  Recommend follow-up. 



IMPRESSION:

Multiple bilateral renal cysts measuring up to 6.3 centimeters with a 

thin septation in the lower pole the right kidney.  Additional 

septated cyst left kidney measuring 4.0 centimeters.  Additional 

bilateral simple cysts.  Recommend follow-up.  No definite evidence 

of renal calculus.

## 2019-01-17 LAB
BACTERIA #/AREA URNS HPF: (no result) /[HPF]
BASOPHILS # BLD AUTO: 0 K/UL (ref 0–0.2)
BASOPHILS NFR BLD: 1 % (ref 0–2)
BILIRUB UR-MCNC: NEGATIVE MG/DL
BUN SERPL-MCNC: 29 MG/DL (ref 9–20)
CALCIUM SERPL-MCNC: 8.7 MG/DL (ref 8.6–10.4)
EOSINOPHIL # BLD AUTO: 0.1 K/UL (ref 0–0.7)
EOSINOPHIL NFR BLD: 2.3 % (ref 0–4)
ERYTHROCYTE [DISTWIDTH] IN BLOOD BY AUTOMATED COUNT: 15.9 % (ref 11.5–14.5)
GFR NON-AFRICAN AMERICAN: 55
GLUCOSE UR STRIP-MCNC: (no result) MG/DL
HGB BLD-MCNC: 10.9 G/DL (ref 12–18)
LEUKOCYTE ESTERASE UR-ACNC: (no result) LEU/UL
LYMPHOCYTES # BLD AUTO: 0.7 K/UL (ref 1–4.3)
LYMPHOCYTES NFR BLD AUTO: 16 % (ref 20–40)
MCH RBC QN AUTO: 27.1 PG (ref 27–31)
MCHC RBC AUTO-ENTMCNC: 32.7 G/DL (ref 33–37)
MCV RBC AUTO: 83 FL (ref 80–94)
MONOCYTES # BLD: 0.7 K/UL (ref 0–0.8)
MONOCYTES NFR BLD: 15.2 % (ref 0–10)
NEUTROPHILS # BLD: 2.8 K/UL (ref 1.8–7)
NEUTROPHILS NFR BLD AUTO: 65.5 % (ref 50–75)
NRBC BLD AUTO-RTO: 0 % (ref 0–2)
PH UR STRIP: 5 [PH] (ref 5–8)
PLATELET # BLD: 256 K/UL (ref 130–400)
PMV BLD AUTO: 9.5 FL (ref 7.2–11.7)
PROT UR STRIP-MCNC: NEGATIVE MG/DL
RBC # BLD AUTO: 4 MIL/UL (ref 4.4–5.9)
RBC # UR STRIP: (no result) /UL
SP GR UR STRIP: 1.01 (ref 1–1.03)
SQUAMOUS EPITHIAL: 2 /HPF (ref 0–5)
UROBILINOGEN UR-MCNC: NORMAL MG/DL (ref 0.2–1)
WBC # BLD AUTO: 4.3 K/UL (ref 4.8–10.8)

## 2019-01-17 RX ADMIN — PANTOPRAZOLE SODIUM SCH MG: 40 TABLET, DELAYED RELEASE ORAL at 09:21

## 2019-01-17 NOTE — RAD
Date of service: 



01/17/2019



PROCEDURE:  Radiographs of the chest and abdomen (obstructive series)



HISTORY:

 abdominal pain, periumblical area 



COMPARISON:

No prior.



TECHNIQUE:

AP radiograph of the chest, with upright and supine radiographs of 

the abdomen.



FINDINGS:



CHEST:

Lungs: Clear.



Cardiovascular: Normal size heart. No pulmonary vascular congestion. 



 No aortic atherosclerotic calcification present



Pleura: No pleural fluid. No pneumothorax.



Other findings: None.



ABDOMEN AND PELVIS:

Bowel: Unremarkable bowel gas pattern. No evidence of mechanical 

obstruction.



Free air: None.



Bones:  Unremarkable.



Other findings: None.



IMPRESSION:

Unremarkable radiographs of chest and abdomen. No evidence of 

mechanical bowel obstruction.

## 2019-01-17 NOTE — CON
DATE:  01/16/2019



UROLOGY CONSULTATION



Urology consultation is requested by Dr. Kiley Burnham.  Urology

consultation is filled by Dr. Glory Oakes.



REASON FOR CONSULTATION:  Urinary tract infection.



HISTORY OF PRESENT ILLNESS:  The patient is 68-year-old male who was

admitted with urinary tract infection.



The patient is in otherwise fair health.  The patient has history of

recurrent urinary tract infection.  The patient was previously admitted in

11/2018 for urinary tract infection.  At that time, urinary tract infection

was felt to be related to the patient's urolithiasis and indwelling

ureteral stent.



The patient had undergone multiple previous treatments for urolithiasis. 

He has a history of previous ureterolithiasis with renal colic and

hydronephrosis.  The patient was treated with indwelling ureteral stent. 

The patient subsequently had lithotripsy.



Most recently in one to two months ago, the patient underwent ureteroscopy,

ureterorenoscopy and laser ureteral lithotripsy of left renal stones.  The

patient subsequently had his stent removed last month.



Mr. Sumner now presents with the following symptoms.  He reports that he

had nausea.  He was not feeling well.  He had associated sensation of

fever.  He had rigors.  He reported bilateral back pain.



Mr. Sumner reports no dysuria or hematuria.  The patient voids with fair

urinary stream.  Of note, the patient has significant prostatic

enlargement, treated with medication.



In the past, the patient has had bleeding in the urinary tract.  Hematuria

was due to BPH as well as due to prostatic enlargement.  Hematuria may have

been due to the urolithiasis and his urinary tract infection.



The patient was admitted on 01/14/2019.  The patient reports that he is

feeling better.  The patient has been on parenteral antibiotic therapy.  He

reports he has had no fever recorded while in the hospital.  He reports

that the flank pain is gone.



Mr. Sumner has significant medical history.  The patient has history of

coronary artery disease.  Has a history of previous angioplasty as well as

stent insertion.  The patient required repeat angioplasty for stent

occlusion associated with discontinuation of his anticoagulation in the

past.  Anticoagulation had been stopped due to hematuria.  It had been held

due to the hematuria.



In subsequent admission during which the patient required surgery for

urolithiasis, the anticoagulation was examined and found the patient had

been treated with heparin as a bridging agent.

PHYSICAL EXAMINATION:

GENERAL:  The patient is a well-developed, well-nourished male, appearing

his stated age.  The patient is awake and alert.  The patient is

comfortable.  The patient's wife is in attendance.

ABDOMEN:  Soft, nontender, nondistended.  No mass or organomegaly.  The

patient has a ventral hernia.

GENITALIA:  Without inflammation.

BACK:  No CVA tenderness.



LABORATORY DATA:  Reviewed.



IMPRESSION:  Urinary tract infection.  Etiology of urinary tract infection

is due to prostatic disease and/or urolithiasis.



RECOMMENDATIONS AND PLAN:  Continue antibiotic therapy.  Urine culture

pending.  Review CT scan.  I recommended the patient to obtain serum PSA. 

Continue medications for prostate disease including alpha-1 blockers and

BAM.



Further therapy to follow according to the patient's clinical course.



Thank you for recommending the patient for urology consultation.







__________________________________________

Glory Oakes MD





cc:  Kiley Burnham MD





DD:  01/17/2019 5:25:35

DT:  01/17/2019 5:29:52

Job # [id number]

## 2019-01-17 NOTE — CP.PCM.PN
Subjective





- Date & Time of Evaluation


Date of Evaluation: 01/17/19


Time of Evaluation: 11:13





- Subjective


Subjective: 








                                       Afebrile. Patient still C/O nausea, and 

pain at the mid-abdomen. Obstructive series was ordered yesterday but was not 

done, Done this AM and no interpretation as of this time at 11;15 AM. Pain is at

the ventral hernia which is reducible. Dr. Amos consulted. Abdominal U/S did

not show any stones as stated at the abdominal Cat scan on admission.





Objective





- Vital Signs/Intake and Output


Vital Signs (last 24 hours): 


                                        











Temp Pulse Resp BP Pulse Ox


 


 98.2 F   87   20   118/78   99 


 


 01/17/19 08:39  01/17/19 08:39  01/17/19 08:39  01/17/19 09:20  01/17/19 08:39











- Medications


Medications: 


                               Current Medications





Aspirin (Ecotrin)  81 mg PO DAILY [facility]


   Last Admin: 01/17/19 09:21 Dose:  81 mg


Carvedilol (Coreg)  6.25 mg PO BID [facility]


   Last Admin: 01/17/19 09:21 Dose:  6.25 mg


Ferrous Sulfate (Feosol)  325 mg PO BID [facility]


   Last Admin: 01/17/19 09:22 Dose:  325 mg


Finasteride (Proscar)  5 mg PO DAILY [facility]


   Last Admin: 01/17/19 09:21 Dose:  5 mg


Furosemide (Lasix)  40 mg PO DAILY [facility]


   Last Admin: 01/17/19 09:20 Dose:  40 mg


Meropenem 1 gm/ Sodium (Chloride)  100 mls @ 100 mls/hr IVPB Q8H [facility]; Protocol


   Last Admin: 01/17/19 05:32 Dose:  100 mls/hr


Losartan Potassium (Cozaar)  50 mg PO DAILY [facility]


   Last Admin: 01/17/19 09:22 Dose:  50 mg


Ondansetron HCl (Zofran Tab)  4 mg PO Q6 PRN


   PRN Reason: Nausea/Vomiting


   Last Admin: 01/17/19 10:28 Dose:  4 mg


Pantoprazole Sodium (Protonix Ec Tab)  40 mg PO DAILY [facility]


   Last Admin: 01/17/19 09:21 Dose:  40 mg


Rosuvastatin Calcium (Crestor)  10 mg PO HS [facility]


   Last Admin: 01/16/19 21:53 Dose:  10 mg


Tamsulosin HCl (Flomax)  0.4 mg PO DAILY [facility]


   Last Admin: 01/17/19 09:21 Dose:  0.4 mg


Ticagrelor (Brilinta)  90 mg PO BID [facility]


   Last Admin: 01/17/19 09:21 Dose:  90 mg











- Labs


Labs: 


                                        





                                 01/17/19 07:48 





                                 01/17/19 07:48 











- Constitutional


Appears: Well, No Acute Distress





- Head Exam


Head Exam: ATRAUMATIC, NORMAL INSPECTION, NORMOCEPHALIC





- Eye Exam


Eye Exam: EOMI, Normal appearance


Pupil Exam: PERRL





- ENT Exam


ENT Exam: Mucous Membranes Moist





- Neck Exam


Neck Exam: Full ROM, Normal Inspection





- Respiratory Exam


Respiratory Exam: Clear to Ausculation Bilateral, NORMAL BREATHING PATTERN





- Cardiovascular Exam


Cardiovascular Exam: REGULAR RHYTHM, +S1, +S2





- GI/Abdominal Exam


GI & Abdominal Exam: Soft


Additional comments: 





                                tender at the ventral hernia which is reducible.





- Rectal Exam


Rectal Exam: Deferred





- Extremities Exam


Extremities Exam: Full ROM, Normal Inspection





- Back Exam


Back Exam: Full ROM, NORMAL INSPECTION





- Neurological Exam


Neurological Exam: Alert, Awake, Normal Gait, Oriented x3





- Psychiatric Exam


Psychiatric exam: Normal Affect, Normal Mood





- Skin


Skin Exam: Intact, Normal Color, Warm





Assessment and Plan


(1) Acute pyelonephritis


Status: Acute   





(2) Abdominal pain


Status: Acute   





(3) Anemia associated with acute blood loss


Status: Chronic   





(4) Hypertension


Status: Chronic   





(5) Pre-diabetes


Status: Chronic   





(6) Ventral hernia without obstruction or gangrene


Status: Chronic   





- Assessment and Plan (Free Text)


Assessment: 





                              Assessment:





          Acute pyelonephritis.





         CAD with CHF.





        Prediabetes.





         Abdominal pains sec to ventral hernia.





        HTN





        Hyperlipidemia





        BPH


Plan: 





                              Plan:








         Continue IV antibiotics.





         Continue cardiac medications.





         Abdominal binder for the ventral hernia.

## 2019-01-17 NOTE — CP.PCM.PN
Subjective





- Date & Time of Evaluation


Date of Evaluation: 01/17/19


Time of Evaluation: 06:35





- Subjective


Subjective: 


General Surgery


Pt seen and examined. No acute events overnight. No new complaints. 





Objective





- Vital Signs/Intake and Output


Vital Signs (last 24 hours): 


                                        











Temp Pulse Resp BP Pulse Ox


 


 98.3 F   91 H  20   129/80   96 


 


 01/16/19 23:45  01/16/19 23:45  01/16/19 23:45  01/16/19 15:20  01/16/19 23:45











- Medications


Medications: 


                               Current Medications





Aspirin (Ecotrin)  81 mg PO DAILY [facility]


   Last Admin: 01/16/19 09:32 Dose:  81 mg


Carvedilol (Coreg)  6.25 mg PO BID [facility]


   Last Admin: 01/16/19 18:17 Dose:  6.25 mg


Ferrous Sulfate (Feosol)  325 mg PO BID [facility]


   Last Admin: 01/16/19 18:18 Dose:  325 mg


Finasteride (Proscar)  5 mg PO DAILY [facility]


   Last Admin: 01/16/19 09:32 Dose:  5 mg


Furosemide (Lasix)  40 mg PO DAILY [facility]


   Last Admin: 01/16/19 09:32 Dose:  40 mg


Meropenem 1 gm/ Sodium (Chloride)  100 mls @ 100 mls/hr IVPB Q8H [facility]; Protocol


   Last Admin: 01/17/19 05:32 Dose:  100 mls/hr


Losartan Potassium (Cozaar)  50 mg PO DAILY [facility]


   Last Admin: 01/16/19 12:33 Dose:  50 mg


Ondansetron HCl (Zofran Tab)  4 mg PO Q6 PRN


   PRN Reason: Nausea/Vomiting


Pantoprazole Sodium (Protonix Ec Tab)  40 mg PO DAILY [facility]


   Last Admin: 01/16/19 09:32 Dose:  40 mg


Rosuvastatin Calcium (Crestor)  10 mg PO HS [facility]


   Last Admin: 01/16/19 21:53 Dose:  10 mg


Tamsulosin HCl (Flomax)  0.4 mg PO DAILY [facility]


   Last Admin: 01/16/19 09:32 Dose:  0.4 mg


Ticagrelor (Brilinta)  90 mg PO BID [facility]


   Last Admin: 01/16/19 18:17 Dose:  90 mg











- Labs


Labs: 


                                        





                                 01/17/19 07:48 





                                 01/16/19 07:36 











- Constitutional


Appears: Non-toxic, No Acute Distress





- Head Exam


Head Exam: ATRAUMATIC, NORMOCEPHALIC





- Eye Exam


Eye Exam: EOMI.  absent: Scleral icterus





- Respiratory Exam


Respiratory Exam: NORMAL BREATHING PATTERN.  absent: Respiratory Distress





- GI/Abdominal Exam


GI & Abdominal Exam: Soft, Tenderness (mild in periumbilical region).  absent: 

Distended, Firm, Guarding, Rigid, Rebound





- Neurological Exam


Neurological Exam: Alert, Awake





- Skin


Skin Exam: Dry, Warm





Assessment and Plan





- Assessment and Plan (Free Text)


Assessment: 


68M with reducible ventral hernia


Plan: 


Follow up as outpatient for further surgical planning.


No surgical intervention at this time. Will sign off


D/W Dr. Bette Fatima PGY4

## 2019-01-17 NOTE — CP.PCM.PN
Subjective





- Date & Time of Evaluation


Date of Evaluation: 01/17/19


Time of Evaluation: 14:46





- Subjective


Subjective: 








CHIEF COMPLAINTS  TODAY :


AFEBRILE,


C/O PERIUMBILICAL PAIN/


C/O CONSTIPATION


 seen by / AND SURGERY





ROS.


HEENT :  N.


Resp :       No  SOB wheezing, cough 


Cardio :     No CP, PND orthopnea 


GI :           +VE ABD. PAIN, NO n/v 


CNS : No headache , focal deficit. 


Musculoskel :  N


Ext. : Pedal pulses intact, no edema or calf pain 


Derm :        N


Psych :     N. 





PE.


Pt. is alert awake in no distress.


V.S  As noted in the chart 


Head ,ear nose,throat and eyes : Normal.


Neck : Supple with normal carotids.


Lungs: Clear air entry.


Heart : S1 & S2 normal . . No murmur. S4 + 


Abd : SOFT , MILD TOMASA-UMBLICAL DISCOMFORT, with normal bowel sounds.


PT WEARING A BINDER FOR SUPPORT


Neuro : Moves all ext. with no localized deficit.


Ext : No edema with intact pulses. Neg. calf tenderness 


Derm : No rashes or decubitus ulcer.





Radiology/Labs .


WBC 4.3


CREAT 1.3/BUN29  IMPROVING


OBST-SERIES - P RESULTS


BLOOD CULTURES -VE TO DATE


URINE CULTUR +VE  E. COLI S  MERREM R- CIPRO





ABD US/BLADDER US NOTED


MULTIPLE CYST B/L KIDNEY





CT SCAN ABD /PELVIS +VE LARGE RT. VENTRAL HERNIA  - 4.1CM WHICH CONTAINS NON 

OBST BOWEL.


MARKEDLY ENLARGED PROSTATE (SEE FULL REPORT )








Objective





- Vital Signs/Intake and Output


Vital Signs (last 24 hours): 


                                        











Temp Pulse Resp BP Pulse Ox


 


 98.2 F   87   20   118/78   99 


 


 01/17/19 08:39  01/17/19 08:39  01/17/19 08:39  01/17/19 09:20  01/17/19 08:39











- Medications


Medications: 


                               Current Medications





Aspirin (Ecotrin)  81 mg PO DAILY [facility]


   Last Admin: 01/17/19 09:21 Dose:  81 mg


Carvedilol (Coreg)  6.25 mg PO BID [facility]


   Last Admin: 01/17/19 09:21 Dose:  6.25 mg


Ferrous Sulfate (Feosol)  325 mg PO BID [facility]


   Last Admin: 01/17/19 09:22 Dose:  325 mg


Finasteride (Proscar)  5 mg PO DAILY [facility]


   Last Admin: 01/17/19 09:21 Dose:  5 mg


Furosemide (Lasix)  40 mg PO DAILY [facility]


   Last Admin: 01/17/19 09:20 Dose:  40 mg


Meropenem 1 gm/ Sodium (Chloride)  100 mls @ 100 mls/hr IVPB Q8H [facility]; Protocol


   Last Admin: 01/17/19 13:45 Dose:  100 mls/hr


Losartan Potassium (Cozaar)  50 mg PO DAILY [facility]


   Last Admin: 01/17/19 09:22 Dose:  50 mg


Ondansetron HCl (Zofran Tab)  4 mg PO Q6 PRN


   PRN Reason: Nausea/Vomiting


   Last Admin: 01/17/19 10:28 Dose:  4 mg


Pantoprazole Sodium (Protonix Ec Tab)  40 mg PO DAILY [facility]


   Last Admin: 01/17/19 09:21 Dose:  40 mg


Rosuvastatin Calcium (Crestor)  10 mg PO HS [facility]


   Last Admin: 01/16/19 21:53 Dose:  10 mg


Tamsulosin HCl (Flomax)  0.4 mg PO DAILY [facility]


   Last Admin: 01/17/19 09:21 Dose:  0.4 mg


Ticagrelor (Brilinta)  90 mg PO BID [facility]


   Last Admin: 01/17/19 09:21 Dose:  90 mg











- Labs


Labs: 


                                        





                                 01/17/19 07:48 





                                 01/17/19 07:48 











Assessment and Plan


(1) Complicated UTI (urinary tract infection)


Status: Acute   





(2) Abdominal pain


Status: Acute   





(3) Kidney stone


Status: Acute   





(4) BPH (benign prostatic hypertrophy)


Status: Chronic   





(5) Diabetes mellitus


Status: Acute   





- Assessment and Plan (Free Text)


Plan: 





CONTINUE iv ANTIBIOTICS.


PATIENT TOLERATING iv MERREM 1 G EVERY 8 HOURLY 1/14/19





F/U OBSTRUCTIVE SERIES


F/U RENAL FUNCTIONS CLOSELY.


LACTULOSE X1 DOSE TODAY FOR CONSTIPATION.





FOLLOW-UP REPEAT ua URINE CULTURE CLEAN-CATCH.

## 2019-01-18 LAB
ANISOCYTOSIS BLD QL SMEAR: SLIGHT
BASOPHILS # BLD AUTO: 0.1 K/UL (ref 0–0.2)
BASOPHILS NFR BLD: 0.5 % (ref 0–2)
BUN SERPL-MCNC: 29 MG/DL (ref 9–20)
CALCIUM SERPL-MCNC: 8.7 MG/DL (ref 8.6–10.4)
EOSINOPHIL # BLD AUTO: 0 K/UL (ref 0–0.7)
EOSINOPHIL NFR BLD: 0.1 % (ref 0–4)
ERYTHROCYTE [DISTWIDTH] IN BLOOD BY AUTOMATED COUNT: 16 % (ref 11.5–14.5)
GFR NON-AFRICAN AMERICAN: 43
HGB BLD-MCNC: 11.3 G/DL (ref 12–18)
LYMPHOCYTE: 1 % (ref 20–40)
LYMPHOCYTES # BLD AUTO: 0.2 K/UL (ref 1–4.3)
LYMPHOCYTES NFR BLD AUTO: 1.6 % (ref 20–40)
MCH RBC QN AUTO: 27 PG (ref 27–31)
MCHC RBC AUTO-ENTMCNC: 32.9 G/DL (ref 33–37)
MCV RBC AUTO: 82 FL (ref 80–94)
MONOCYTE: 4 % (ref 0–10)
MONOCYTES # BLD: 0.6 K/UL (ref 0–0.8)
MONOCYTES NFR BLD: 4.2 % (ref 0–10)
NEUTROPHILS # BLD: 13.5 K/UL (ref 1.8–7)
NEUTROPHILS NFR BLD AUTO: 84 % (ref 50–75)
NEUTROPHILS NFR BLD AUTO: 93.6 % (ref 50–75)
NEUTS BAND NFR BLD: 11 % (ref 0–2)
NRBC BLD AUTO-RTO: 0 % (ref 0–2)
PLATELET # BLD EST: NORMAL 10*3/UL
PLATELET # BLD: 309 K/UL (ref 130–400)
PMV BLD AUTO: 9.2 FL (ref 7.2–11.7)
RBC # BLD AUTO: 4.19 MIL/UL (ref 4.4–5.9)
TOTAL CELLS COUNTED BLD: 100
TOXIC GRANULES BLD QL SMEAR: PRESENT
WBC # BLD AUTO: 14.4 K/UL (ref 4.8–10.8)

## 2019-01-18 RX ADMIN — PANTOPRAZOLE SODIUM SCH MG: 40 TABLET, DELAYED RELEASE ORAL at 10:18

## 2019-01-18 NOTE — CP.PCM.PN
Subjective





- Date & Time of Evaluation


Date of Evaluation: 01/18/19


Time of Evaluation: 11:34





- Subjective


Subjective: 





              Patient started to be febrile last night and early this AM. Blood 

C/S and urine C/s repeated. WBC yesterday was normal but with inc in basophils.





       Still C/O periumbilical pains with nuasea. Obstructive series showed no 

obstruction. Abdomen revealed periumbilical tenderness with reducible hernia. 





      





Objective





- Vital Signs/Intake and Output


Vital Signs (last 24 hours): 


                                        











Temp Pulse Resp BP Pulse Ox


 


 101.6 F H  136 H  22   110/65   97 


 


 01/18/19 08:02  01/18/19 08:02  01/18/19 08:02  01/18/19 10:18  01/18/19 08:02











- Medications


Medications: 


                               Current Medications





Aspirin (Ecotrin)  81 mg PO DAILY [facility]


   Last Admin: 01/18/19 10:18 Dose:  81 mg


Carvedilol (Coreg)  6.25 mg PO BID [facility]


   Last Admin: 01/18/19 10:18 Dose:  6.25 mg


Ferrous Sulfate (Feosol)  325 mg PO BID [facility]


   Last Admin: 01/18/19 10:18 Dose:  325 mg


Finasteride (Proscar)  5 mg PO DAILY [facility]


   Last Admin: 01/18/19 10:18 Dose:  5 mg


Furosemide (Lasix)  40 mg PO DAILY [facility]


   Last Admin: 01/18/19 10:18 Dose:  40 mg


Meropenem 1 gm/ Sodium (Chloride)  100 mls @ 100 mls/hr IVPB Q8H [facility]; Protocol


   Last Admin: 01/18/19 05:43 Dose:  100 mls/hr


Losartan Potassium (Cozaar)  50 mg PO DAILY [facility]


   Last Admin: 01/18/19 10:21 Dose:  50 mg


Ondansetron HCl (Zofran Tab)  4 mg PO Q6 PRN


   PRN Reason: Nausea/Vomiting


   Last Admin: 01/17/19 10:28 Dose:  4 mg


Pantoprazole Sodium (Protonix Ec Tab)  40 mg PO DAILY [facility]


   Last Admin: 01/18/19 10:18 Dose:  40 mg


Rosuvastatin Calcium (Crestor)  10 mg PO HS [facility]


   Last Admin: 01/17/19 21:54 Dose:  10 mg


Tamsulosin HCl (Flomax)  0.4 mg PO DAILY [facility]


   Last Admin: 01/18/19 10:18 Dose:  0.4 mg


Ticagrelor (Brilinta)  90 mg PO BID [facility]


   Last Admin: 01/18/19 10:18 Dose:  90 mg











- Labs


Labs: 


                                        





                                 01/17/19 07:48 





                                 01/17/19 07:48 











- Constitutional


Appears: No Acute Distress





- Head Exam


Head Exam: ATRAUMATIC, NORMAL INSPECTION





- Eye Exam


Eye Exam: EOMI, Normal appearance


Pupil Exam: PERRL





- ENT Exam


ENT Exam: Mucous Membranes Moist





- Respiratory Exam


Respiratory Exam: Clear to Ausculation Bilateral, NORMAL BREATHING PATTERN





- Cardiovascular Exam


Cardiovascular Exam: REGULAR RHYTHM, +S1, +S2





- GI/Abdominal Exam


GI & Abdominal Exam: Soft, Normal Bowel Sounds





- Rectal Exam


Rectal Exam: Deferred





- Back Exam


Back Exam: Full ROM, NORMAL INSPECTION





- Neurological Exam


Neurological Exam: Alert, Awake, Oriented x3





- Psychiatric Exam


Psychiatric exam: Normal Affect, Normal Mood





- Skin


Skin Exam: Intact, Normal Color, Warm





Assessment and Plan


(1) Acute pyelonephritis


Status: Acute   





(2) Abdominal pain


Status: Acute   





(3) Anemia associated with acute blood loss


Status: Chronic   





(4) Hypertension


Status: Chronic   





(5) Pre-diabetes


Status: Chronic   





(6) Ventral hernia without obstruction or gangrene


Status: Chronic   





- Assessment and Plan (Free Text)


Assessment: 





                             Asessment:





         Acute pyelonephritis





        Abdominal pains sec to reducible ventral hernia.





        CAD





       BPH





       


Plan: 





                           Plan:








         Continue IV MErrem.





        Pan culture.





        Will discuss with Dr. Amos regarding abdominal pains.

## 2019-01-18 NOTE — CP.PCM.PN
Subjective





- Date & Time of Evaluation


Date of Evaluation: 01/18/19


Time of Evaluation: 14:00





- Subjective


Subjective: 





CHIEF COMPLAINTS  TODAY :


spiked fever of 100.7


STILL C/O PERIUMBILICAL PAIN/











ROS.


HEENT :  N.


Resp :       No  SOB wheezing, cough 


Cardio :     No CP, PND orthopnea 


GI :           +VE ABD. PAIN, NO n/v 


CNS : No headache , focal deficit. 


Musculoskel :  N


Ext. : Pedal pulses intact, no edema or calf pain 


Derm :        N


Psych :     N. 





PE.


Pt. is alert awake in no distress.


V.S  As noted in the chart 


Head ,ear nose,throat and eyes : Normal.


Neck : Supple with normal carotids.


Lungs: Clear air entry.


Heart : S1 & S2 normal . . No murmur. S4 + 


Abd : SOFT , MILD TOMASA-UMBLICAL DISCOMFORT, with normal bowel sounds.


PT WEARING A BINDER FOR SUPPORT


Neuro : Moves all ext. with no localized deficit.


Ext : No edema with intact pulses. Neg. calf tenderness 


Derm : No rashes or decubitus ulcer.





Radiology/Labs .


REVIEWED


REPEAT UA/ URINE CULTURE -P


OBST-SERIES - -VE OBST.


BLOOD CULTURES -VE TO DATE


URINE CULTUR +VE  E. COLI S  MERREM R- CIPRO





ABD US/BLADDER US NOTED


MULTIPLE CYST B/L KIDNEY





CT SCAN ABD /PELVIS +VE LARGE RT. VENTRAL HERNIA  - 4.1CM WHICH CONTAINS NON 

OBST BOWEL.


MARKEDLY ENLARGED PROSTATE (SEE FULL REPORT )











Objective





- Vital Signs/Intake and Output


Vital Signs (last 24 hours): 


                                        











Temp Pulse Resp BP Pulse Ox


 


 101.6 F H  136 H  22   110/65   97 


 


 01/18/19 08:02  01/18/19 08:02  01/18/19 08:02  01/18/19 10:18  01/18/19 08:02











- Medications


Medications: 


                               Current Medications





Aspirin (Ecotrin)  81 mg PO DAILY [facility]


   Last Admin: 01/18/19 10:18 Dose:  81 mg


Carvedilol (Coreg)  6.25 mg PO BID [facility]


   Last Admin: 01/18/19 10:18 Dose:  6.25 mg


Ferrous Sulfate (Feosol)  325 mg PO BID [facility]


   Last Admin: 01/18/19 10:18 Dose:  325 mg


Finasteride (Proscar)  5 mg PO DAILY [facility]


   Last Admin: 01/18/19 10:18 Dose:  5 mg


Furosemide (Lasix)  40 mg PO DAILY [facility]


   Last Admin: 01/18/19 10:18 Dose:  40 mg


Meropenem 1 gm/ Sodium (Chloride)  100 mls @ 100 mls/hr IVPB Q8H [facility]; Protocol


   Last Admin: 01/18/19 13:40 Dose:  100 mls/hr


Metronidazole (Flagyl)  500 mg in 100 mls @ 100 mls/hr IVPB Q8H [facility]; Protocol


   Last Admin: 01/18/19 13:39 Dose:  100 mls/hr


Losartan Potassium (Cozaar)  50 mg PO DAILY [facility]


   Last Admin: 01/18/19 10:21 Dose:  50 mg


Ondansetron HCl (Zofran Tab)  4 mg PO Q6 PRN


   PRN Reason: Nausea/Vomiting


   Last Admin: 01/18/19 12:53 Dose:  4 mg


Pantoprazole Sodium (Protonix Ec Tab)  40 mg PO DAILY [facility]


   Last Admin: 01/18/19 10:18 Dose:  40 mg


Rosuvastatin Calcium (Crestor)  10 mg PO HS [facility]


   Last Admin: 01/17/19 21:54 Dose:  10 mg


Tamsulosin HCl (Flomax)  0.4 mg PO DAILY [facility]


   Last Admin: 01/18/19 10:18 Dose:  0.4 mg


Ticagrelor (Brilinta)  90 mg PO BID [facility]


   Last Admin: 01/18/19 10:18 Dose:  90 mg











- Labs


Labs: 


                                        





                                 01/18/19 11:47 





                                 01/18/19 12:29 











Assessment and Plan


(1) Complicated UTI (urinary tract infection)


Status: Acute   





(2) Abdominal pain


Status: Acute   





(3) Kidney stone


Status: Acute   





(4) BPH (benign prostatic hypertrophy)


Status: Chronic   





(5) Diabetes mellitus


Status: Acute   





- Assessment and Plan (Free Text)


Plan: 





CONTINUE iv ANTIBIOTICS.


PATIENT TOLERATING iv MERREM 1 G EVERY 8 HOURLY 1/14/19





ADD IV FLAGYL 500MG IVPB [id number] 1/18/19


PROSTATE US (VIA RECTAL ) R/O PROSTATIC ABSCESS.





F/U RENAL FUNCTIONS CLOSELY.


WILL DISCUSS W ATTENDING.

## 2019-01-19 LAB — PSA SERPL-MCNC: 9.9 NG/ML

## 2019-01-19 RX ADMIN — FLUCONAZOLE SCH MLS/HR: 200 INJECTION, SOLUTION INTRAVENOUS at 18:10

## 2019-01-19 RX ADMIN — PANTOPRAZOLE SODIUM SCH MG: 40 TABLET, DELAYED RELEASE ORAL at 09:06

## 2019-01-19 NOTE — CP.PCM.PN
Subjective





- Date & Time of Evaluation


Date of Evaluation: 01/19/19


Time of Evaluation: 14:24





- Subjective


Subjective: 





CHIEF COMPLAINTS  TODAY :


spiked fever of 101.7


LOWER /PERIUMBLICAL ABDOMINAL PAIN +











ROS.


HEENT :  N.


Resp :       No  SOB wheezing, cough 


Cardio :     No CP, PND orthopnea 


GI :           +VE ABD. PAIN, NO n/v 


CNS : No headache , focal deficit. 


Musculoskel :  N


Ext. : Pedal pulses intact, no edema or calf pain 


Derm :        N


Psych :     N. 





PE.


Pt. is alert awake in no distress.


V.S  As noted in the chart 


Head ,ear nose,throat and eyes : Normal.


Neck : Supple with normal carotids.


Lungs: Clear air entry.


Heart : S1 & S2 normal . . No murmur. S4 + 


Abd : SOFT , LOWER ABDOMINAL /TOMASA-UMBLICAL DISCOMFORT, 


with normal bowel sounds.


PT WEARING A BINDER FOR SUPPORT


Neuro : Moves all ext. with no localized deficit.


Ext : No edema with intact pulses. Neg. calf tenderness 


Derm : No rashes or decubitus ulcer.





Radiology/Labs .


REVIEWED


WBC 14.9  C 11% BANDS


CREAT 1.6 /BUN 29


REPEAT URINE CULTURE  1/17/19 +VE YEAST


OBST-SERIES - -VE OBST.





REPEAT BLOOD CULTURES -VE  X 24HRS


URINE CULTUR +VE  E. COLI S  MERREM R- CIPRO

















Objective





- Vital Signs/Intake and Output


Vital Signs (last 24 hours): 


                                        











Temp Pulse Resp BP Pulse Ox


 


 97.5 F L  80   20   156/78 H  100 


 


 01/19/19 07:10  01/19/19 07:10  01/19/19 07:10  01/19/19 09:06  01/19/19 07:10








Intake and Output: 


                                        











 01/19/19 01/19/19





 06:59 18:59


 


Output Total 100 


 


Balance -100 














- Medications


Medications: 


                               Current Medications





Aspirin (Ecotrin)  81 mg PO DAILY [facility]


   Last Admin: 01/19/19 09:06 Dose:  81 mg


Carvedilol (Coreg)  6.25 mg PO BID [facility]


   Last Admin: 01/19/19 09:06 Dose:  6.25 mg


Docusate Sodium (Colace)  100 mg PO BID [facility]


Ferrous Sulfate (Feosol)  325 mg PO BID [facility]


   Last Admin: 01/19/19 09:05 Dose:  325 mg


Finasteride (Proscar)  5 mg PO DAILY [facility]


   Last Admin: 01/19/19 09:05 Dose:  5 mg


Furosemide (Lasix)  40 mg PO DAILY [facility]


   Last Admin: 01/19/19 09:06 Dose:  40 mg


Meropenem 1 gm/ Sodium (Chloride)  100 mls @ 100 mls/hr IVPB Q8H [facility]; Protocol


   Last Admin: 01/18/19 22:57 Dose:  100 mls/hr


Metronidazole (Flagyl)  500 mg in 100 mls @ 100 mls/hr IVPB Q8H [facility]; Protocol


   Last Admin: 01/19/19 14:21 Dose:  100 mls/hr


Losartan Potassium (Cozaar)  50 mg PO DAILY [facility]


   Last Admin: 01/19/19 14:17 Dose:  50 mg


Ondansetron HCl (Zofran Tab)  4 mg PO Q6 PRN


   PRN Reason: Nausea/Vomiting


   Last Admin: 01/18/19 12:53 Dose:  4 mg


Pantoprazole Sodium (Protonix Ec Tab)  40 mg PO DAILY [facility]


   Last Admin: 01/19/19 09:06 Dose:  40 mg


Rosuvastatin Calcium (Crestor)  10 mg PO HS [facility]


   Last Admin: 01/18/19 21:41 Dose:  10 mg


Tamsulosin HCl (Flomax)  0.4 mg PO DAILY [facility]


   Last Admin: 01/19/19 09:05 Dose:  0.4 mg


Ticagrelor (Brilinta)  90 mg PO BID [facility]


   Last Admin: 01/19/19 09:05 Dose:  90 mg











- Labs


Labs: 


                                        





                                 01/18/19 11:47 





                                 01/18/19 12:29 











Assessment and Plan


(1) Complicated UTI (urinary tract infection)


Status: Acute   





(2) Abdominal pain


Status: Acute   





(3) Kidney stone


Status: Acute   





(4) BPH (benign prostatic hypertrophy)


Assessment & Plan: 


R/O PROSTATI ABSCESS.


AS DISCUSSED MRI PELVIS W/O CONTRAST R/O ABSCESS PROSTRATE


Status: Chronic   





(5) Diabetes mellitus


Status: Acute   





- Assessment and Plan (Free Text)


Plan: 





CONTINUE iv ANTIBIOTICS.





DECREASE IV  MERREM 1 G EVERY 12HRLY  1/14/19 ( 1/19/19 )


ADD IV DIFLUCAN 200MG IVPB DAILY 1/19/19.


CONTINUE IV FLAGYL 500MG IVPB [id number] 1/18/19








PROSTATE US (VIA RECTAL ) R/O PROSTATIC ABSCESS. (NOT DONE AS OPD PROCEDURE )


WILL GET MRI PELVIS AS DISCUSSED.





F/U RENAL FUNCTIONS CLOSELY.





 DISCUSSED W ATTENDING.

## 2019-01-19 NOTE — CP.PCM.PN
Subjective





- Date & Time of Evaluation


Date of Evaluation: 01/19/19


Time of Evaluation: 12:48





- Subjective


Subjective: 





                                       [place]febrile. [place] [address] with 

bandemia. As discussed with Dr. Lopez. Possibility of ?prostatic abscess?





                   Will order MRI of pelvis. to assess the prostate.








Objective





- Vital Signs/Intake and Output


Vital Signs (last 24 hours): 


                                        











Temp Pulse Resp BP Pulse Ox


 


 97.5 F L  80   20   156/78 H  100 


 


 01/19/19 07:10  01/19/19 07:10  01/19/19 07:10  01/19/19 09:06  01/19/19 07:10








Intake and Output: 


                                        











 01/19/19 01/19/19





 06:59 18:59


 


Output Total 100 


 


Balance -100 














- Medications


Medications: 


                               Current Medications





Aspirin (Ecotrin)  81 mg PO DAILY [facility]


   Last Admin: 01/19/19 09:06 Dose:  81 mg


Carvedilol (Coreg)  6.25 mg PO BID [facility]


   Last Admin: 01/19/19 09:06 Dose:  6.25 mg


Ferrous Sulfate (Feosol)  325 mg PO BID [facility]


   Last Admin: 01/19/19 09:05 Dose:  325 mg


Finasteride (Proscar)  5 mg PO DAILY [facility]


   Last Admin: 01/19/19 09:05 Dose:  5 mg


Furosemide (Lasix)  40 mg PO DAILY [facility]


   Last Admin: 01/19/19 09:06 Dose:  40 mg


Meropenem 1 gm/ Sodium (Chloride)  100 mls @ 100 mls/hr IVPB Q8H [facility]; Protocol


   Last Admin: 01/18/19 22:57 Dose:  100 mls/hr


Metronidazole (Flagyl)  500 mg in 100 mls @ 100 mls/hr IVPB Q8H [facility]; Protocol


   Last Admin: 01/19/19 05:15 Dose:  100 mls/hr


Losartan Potassium (Cozaar)  50 mg PO DAILY [facility]


   Last Admin: 01/18/19 10:21 Dose:  50 mg


Ondansetron HCl (Zofran Tab)  4 mg PO Q6 PRN


   PRN Reason: Nausea/Vomiting


   Last Admin: 01/18/19 12:53 Dose:  4 mg


Pantoprazole Sodium (Protonix Ec Tab)  40 mg PO DAILY [facility]


   Last Admin: 01/19/19 09:06 Dose:  40 mg


Rosuvastatin Calcium (Crestor)  10 mg PO HS [facility]


   Last Admin: 01/18/19 21:41 Dose:  10 mg


Tamsulosin HCl (Flomax)  0.4 mg PO DAILY [facility]


   Last Admin: 01/19/19 09:05 Dose:  0.4 mg


Ticagrelor (Brilinta)  90 mg PO BID [facility]


   Last Admin: 01/19/19 09:05 Dose:  90 mg











- Labs


Labs: 


                                        





                                 01/18/19 11:47 





                                 01/18/19 12:29 











- Constitutional


Appears: No Acute Distress





- Head Exam


Head Exam: ATRAUMATIC, NORMAL INSPECTION, NORMOCEPHALIC





- Eye Exam


Eye Exam: Normal appearance


Pupil Exam: PERRL





- ENT Exam


ENT Exam: Mucous Membranes Moist





- Neck Exam


Neck Exam: Full ROM





- Respiratory Exam


Respiratory Exam: Clear to Ausculation Bilateral, NORMAL BREATHING PATTERN





- Cardiovascular Exam


Cardiovascular Exam: REGULAR RHYTHM, +S1, +S2





- GI/Abdominal Exam


GI & Abdominal Exam: Soft, Normal Bowel Sounds


Additional comments: 





                          Less tenderness at the periumbilical area.





- Rectal Exam


Rectal Exam: Deferred





- Extremities Exam


Extremities Exam: Full ROM





- Back Exam


Back Exam: Full ROM





- Neurological Exam


Neurological Exam: Alert, Awake, Oriented x3





- Skin


Skin Exam: Intact, Normal Color, Warm





Assessment and Plan


(1) Acute pyelonephritis


Status: Acute   





(2) Abdominal pain


Status: Acute   





(3) Anemia associated with acute blood loss


Status: Chronic   





(4) Hypertension


Status: Chronic   





(5) Pre-diabetes


Status: Chronic   





(6) Ventral hernia without obstruction or gangrene


Status: Chronic   





- Assessment and Plan (Free Text)


Assessment: 





                                   Assessment:





           Acute UTI.





           CAD





           Prediabetes.





           HTN.





           BPH





      


Plan: 





                                 Plan:





           Continue IV Merrem, and Flagyl.





           MRI of the pelvis.

## 2019-01-20 LAB
BASOPHILS # BLD AUTO: 0.1 K/UL (ref 0–0.2)
BASOPHILS NFR BLD: 1.2 % (ref 0–2)
EOSINOPHIL # BLD AUTO: 0.2 K/UL (ref 0–0.7)
EOSINOPHIL NFR BLD: 3.4 % (ref 0–4)
ERYTHROCYTE [DISTWIDTH] IN BLOOD BY AUTOMATED COUNT: 16.1 % (ref 11.5–14.5)
HGB BLD-MCNC: 10.6 G/DL (ref 12–18)
LYMPHOCYTES # BLD AUTO: 0.8 K/UL (ref 1–4.3)
LYMPHOCYTES NFR BLD AUTO: 15.1 % (ref 20–40)
MCH RBC QN AUTO: 26.8 PG (ref 27–31)
MCHC RBC AUTO-ENTMCNC: 32.4 G/DL (ref 33–37)
MCV RBC AUTO: 82.6 FL (ref 80–94)
MONOCYTES # BLD: 0.6 K/UL (ref 0–0.8)
MONOCYTES NFR BLD: 12.8 % (ref 0–10)
NEUTROPHILS # BLD: 3.4 K/UL (ref 1.8–7)
NEUTROPHILS NFR BLD AUTO: 67.5 % (ref 50–75)
NRBC BLD AUTO-RTO: 0 % (ref 0–2)
PLATELET # BLD: 317 K/UL (ref 130–400)
PMV BLD AUTO: 9.3 FL (ref 7.2–11.7)
RBC # BLD AUTO: 3.96 MIL/UL (ref 4.4–5.9)
WBC # BLD AUTO: 5 K/UL (ref 4.8–10.8)

## 2019-01-20 RX ADMIN — WATER SCH MLS/HR: 1 INJECTION INTRAMUSCULAR; INTRAVENOUS; SUBCUTANEOUS at 21:36

## 2019-01-20 RX ADMIN — WATER SCH MLS/HR: 1 INJECTION INTRAMUSCULAR; INTRAVENOUS; SUBCUTANEOUS at 09:57

## 2019-01-20 RX ADMIN — PANTOPRAZOLE SODIUM SCH MG: 40 TABLET, DELAYED RELEASE ORAL at 09:50

## 2019-01-20 RX ADMIN — FLUCONAZOLE SCH MLS/HR: 200 INJECTION, SOLUTION INTRAVENOUS at 19:13

## 2019-01-20 NOTE — CP.PCM.PN
Subjective





- Date & Time of Evaluation


Date of Evaluation: 01/20/19


Time of Evaluation: 22:49





- Subjective


Subjective: 





CHIEF COMPLAINTS  TODAY :


TEMPERATURE DOWN,


no new complaints.





MRI PELVIS -DONE - P report








ROS.


HEENT :  N.


Resp :       No  SOB wheezing, cough 


Cardio :     No CP, PND orthopnea 


GI :           +VE ABD. PAIN, NO n/v 


CNS : No headache , focal deficit. 


Musculoskel :  N


Ext. : Pedal pulses intact, no edema or calf pain 


Derm :        N


Psych :     N. 





PE.


Pt. is alert awake in no distress.


V.S  As noted in the chart 


Head ,ear nose,throat and eyes : Normal.


Neck : Supple with normal carotids.


Lungs: Clear air entry.


Heart : S1 & S2 normal . . No murmur. S4 + 


Abd : SOFT , LOWER ABDOMINAL /TOMASA-UMBLICAL DISCOMFORT, 


with normal bowel sounds.


PT WEARING A BINDER FOR SUPPORT


Neuro : Moves all ext. with no localized deficit.


Ext : No edema with intact pulses. Neg. calf tenderness 


Derm : No rashes or decubitus ulcer.





Radiology/Labs .


REVIEWED


WBC 14.9  C 11% BANDS


CREAT 1.6 /BUN 29


REPEAT URINE CULTURE  1/17/19 +VE YEAST


OBST-SERIES - -VE OBST.





REPEAT BLOOD CULTURES -VE  X 24HRS


URINE CULTUR +VE  E. COLI S  MERREM R- CIPRO




















Objective





- Vital Signs/Intake and Output


Vital Signs (last 24 hours): 


                                        











Temp Pulse Resp BP Pulse Ox


 


 97.7 F   74   20   116/71   97 


 


 01/20/19 15:32  01/20/19 15:32  01/20/19 15:32  01/20/19 15:32  01/20/19 15:32











- Medications


Medications: 


                               Current Medications





Aspirin (Ecotrin)  81 mg PO DAILY [facility]


   Last Admin: 01/20/19 09:50 Dose:  81 mg


Carvedilol (Coreg)  6.25 mg PO BID [facility]


   Last Admin: 01/20/19 19:12 Dose:  6.25 mg


Docusate Sodium (Colace)  100 mg PO BID [facility]


   Last Admin: 01/20/19 19:12 Dose:  100 mg


Ferrous Sulfate (Feosol)  325 mg PO BID [facility]


   Last Admin: 01/20/19 19:13 Dose:  325 mg


Finasteride (Proscar)  5 mg PO DAILY [facility]


   Last Admin: 01/20/19 09:50 Dose:  5 mg


Furosemide (Lasix)  40 mg PO DAILY [facility]


   Last Admin: 01/20/19 09:59 Dose:  Not Given


Meropenem 1 gm/ Sodium (Chloride)  100 mls @ 100 mls/hr IVPB Q12H [facility]; Protocol


   Last Admin: 01/20/19 16:34 Dose:  100 mls/hr


Fluconazole (Diflucan Iv 200 Mg/100 Ml Ns)  100 mls @ 100 mls/hr IVPB Q24H [facility]; 

Protocol


   Last Admin: 01/20/19 19:13 Dose:  100 mls/hr


Metronidazole (Flagyl)  500 mg in 100 mls @ 100 mls/hr IVPB Q12 [facility]; Protocol


   Last Admin: 01/20/19 21:36 Dose:  100 mls/hr


Losartan Potassium (Cozaar)  50 mg PO DAILY [facility]


   Last Admin: 01/20/19 09:56 Dose:  50 mg


Ondansetron HCl (Zofran Tab)  4 mg PO Q6 PRN


   PRN Reason: Nausea/Vomiting


   Last Admin: 01/18/19 12:53 Dose:  4 mg


Pantoprazole Sodium (Protonix Ec Tab)  40 mg PO DAILY [facility]


   Last Admin: 01/20/19 09:50 Dose:  40 mg


Rosuvastatin Calcium (Crestor)  10 mg PO HS [facility]


   Last Admin: 01/20/19 21:36 Dose:  10 mg


Tamsulosin HCl (Flomax)  0.4 mg PO DAILY [facility]


   Last Admin: 01/20/19 09:50 Dose:  0.4 mg


Ticagrelor (Brilinta)  90 mg PO BID [facility]


   Last Admin: 01/20/19 19:12 Dose:  90 mg











- Labs


Labs: 


                                        





                                 01/20/19 13:54 





                                 01/18/19 12:29 











Assessment and Plan


(1) Complicated UTI (urinary tract infection)


Status: Acute   





(2) Abdominal pain


Status: Acute   





(3) Kidney stone


Status: Acute   





(4) BPH (benign prostatic hypertrophy)


Status: Chronic   





(5) Diabetes mellitus


Status: Acute   





- Assessment and Plan (Free Text)


Plan: 





CONTINUE iv ANTIBIOTICS.





ON IV  MERREM 1 G EVERY 12HRLY  1/14/19 ( 1/19/19 )


ON IV DIFLUCAN 200MG IVPB DAILY 1/19/19.


CONTINUE IV FLAGYL 500MG IVPB [id number] 1/18/19





F/U MRI PELVIS.


REPEAT BLOOD WORKS IN AM


CASE DISCUSSED WITH STAFF - MRI REPORT PENDING.

## 2019-01-20 NOTE — CP.PCM.PN
Subjective





- Date & Time of Evaluation


Date of Evaluation: 01/20/19


Time of Evaluation: 12:35





- Subjective


Subjective: 





                                         Afebrile. Patient's abdominal pains 

slight. Abdominal bindr helps with the pain. WBC yesterday showed a bandemia





           with inc in WBC ct. MRI of the pelvis done stat  yesterday but no 

official result as yet at 12:00 noon time the following day Culture of the





            urine showed yeast infection and IV Diflucan was started yesterday. 







   





Objective





- Vital Signs/Intake and Output


Vital Signs (last 24 hours): 


                                        











Temp Pulse Resp BP Pulse Ox


 


 97.6 F   67   20   134/77   98 


 


 01/20/19 07:20  01/20/19 07:20  01/20/19 07:20  01/20/19 07:20  01/20/19 07:20











- Medications


Medications: 


                               Current Medications





Aspirin (Ecotrin)  81 mg PO DAILY [facility]


   Last Admin: 01/20/19 09:50 Dose:  81 mg


Carvedilol (Coreg)  6.25 mg PO BID [facility]


   Last Admin: 01/20/19 09:50 Dose:  6.25 mg


Docusate Sodium (Colace)  100 mg PO BID [facility]


   Last Admin: 01/20/19 09:50 Dose:  100 mg


Ferrous Sulfate (Feosol)  325 mg PO BID [facility]


   Last Admin: 01/20/19 09:57 Dose:  325 mg


Finasteride (Proscar)  5 mg PO DAILY [facility]


   Last Admin: 01/20/19 09:50 Dose:  5 mg


Furosemide (Lasix)  40 mg PO DAILY [facility]


   Last Admin: 01/20/19 09:59 Dose:  Not Given


Meropenem 1 gm/ Sodium (Chloride)  100 mls @ 100 mls/hr IVPB Q12H [facility]; Protocol


   Last Admin: 01/20/19 04:46 Dose:  100 mls/hr


Fluconazole (Diflucan Iv 200 Mg/100 Ml Ns)  100 mls @ 100 mls/hr IVPB Q24H [facility]; 

Protocol


   Last Admin: 01/19/19 18:10 Dose:  100 mls/hr


Metronidazole (Flagyl)  500 mg in 100 mls @ 100 mls/hr IVPB Q12 [facility]; Protocol


   Last Admin: 01/20/19 09:57 Dose:  100 mls/hr


Losartan Potassium (Cozaar)  50 mg PO DAILY [facility]


   Last Admin: 01/20/19 09:56 Dose:  50 mg


Ondansetron HCl (Zofran Tab)  4 mg PO Q6 PRN


   PRN Reason: Nausea/Vomiting


   Last Admin: 01/18/19 12:53 Dose:  4 mg


Pantoprazole Sodium (Protonix Ec Tab)  40 mg PO DAILY [facility]


   Last Admin: 01/20/19 09:50 Dose:  40 mg


Rosuvastatin Calcium (Crestor)  10 mg PO HS [facility]


   Last Admin: 01/19/19 22:10 Dose:  10 mg


Tamsulosin HCl (Flomax)  0.4 mg PO DAILY [facility]


   Last Admin: 01/20/19 09:50 Dose:  0.4 mg


Ticagrelor (Brilinta)  90 mg PO BID [facility]


   Last Admin: 01/20/19 09:50 Dose:  90 mg











- Labs


Labs: 


                                        





                                 01/18/19 11:47 





                                 01/18/19 12:29 











- Constitutional


Appears: No Acute Distress





- Head Exam


Head Exam: ATRAUMATIC, NORMAL INSPECTION, NORMOCEPHALIC





- Eye Exam


Eye Exam: EOMI, PERRL


Pupil Exam: PERRL





- ENT Exam


ENT Exam: Mucous Membranes Moist





- Neck Exam


Neck Exam: Full ROM





- Respiratory Exam


Respiratory Exam: Clear to Ausculation Bilateral, NORMAL BREATHING PATTERN





- Cardiovascular Exam


Cardiovascular Exam: REGULAR RHYTHM, +S1, +S2





- GI/Abdominal Exam


GI & Abdominal Exam: Soft


Additional comments: 





                                      Periumbilical tenderness is lesser.





- Rectal Exam


Rectal Exam: Deferred





- Extremities Exam


Extremities Exam: Full ROM, Normal Inspection





- Back Exam


Back Exam: Full ROM





- Neurological Exam


Neurological Exam: Alert, Awake, Oriented x3





- Psychiatric Exam


Psychiatric exam: Normal Affect, Normal Mood





- Skin


Skin Exam: Intact, Normal Color, Warm





Assessment and Plan


(1) Acute pyelonephritis


Status: Acute   





(2) Abdominal pain


Status: Acute   





(3) Anemia associated with acute blood loss


Status: Chronic   





(4) Hypertension


Status: Chronic   





(5) Pre-diabetes


Status: Chronic   





(6) Ventral hernia without obstruction or gangrene


Status: Chronic   





- Assessment and Plan (Free Text)


Assessment: 





                                    Assessment:








          ACUTE UTI,R/O Prostatic abscess.





          CAD.





          HTN.





          Prediabetes.








          BPH





    


Plan: 





                          Plan:








          Check result of MRI pelvis message left with the radiology dept to 

call me for result as soon as the films are read by the radiologist which I hope





        is 24 hours after the procedure was done.





         Continue IV Merrem, Flagyl, and Diflucan.

## 2019-01-21 LAB
ALBUMIN SERPL-MCNC: 3.3 G/DL (ref 3.5–5)
ALBUMIN/GLOB SERPL: 1 {RATIO} (ref 1–2.1)
ALT SERPL-CCNC: 41 U/L (ref 21–72)
AST SERPL-CCNC: 32 U/L (ref 17–59)
BASOPHILS # BLD AUTO: 0 K/UL (ref 0–0.2)
BASOPHILS NFR BLD: 1.1 % (ref 0–2)
BILIRUB DIRECT SERPL-MCNC: 0.2 MG/DL (ref 0–0.4)
BUN SERPL-MCNC: 26 MG/DL (ref 9–20)
CALCIUM SERPL-MCNC: 8.2 MG/DL (ref 8.6–10.4)
EOSINOPHIL # BLD AUTO: 0.1 K/UL (ref 0–0.7)
EOSINOPHIL NFR BLD: 3.4 % (ref 0–4)
ERYTHROCYTE [DISTWIDTH] IN BLOOD BY AUTOMATED COUNT: 15.6 % (ref 11.5–14.5)
GFR NON-AFRICAN AMERICAN: 50
HGB BLD-MCNC: 10.5 G/DL (ref 12–18)
LYMPHOCYTES # BLD AUTO: 0.8 K/UL (ref 1–4.3)
LYMPHOCYTES NFR BLD AUTO: 19.4 % (ref 20–40)
MCH RBC QN AUTO: 26.4 PG (ref 27–31)
MCHC RBC AUTO-ENTMCNC: 31.9 G/DL (ref 33–37)
MCV RBC AUTO: 82.6 FL (ref 80–94)
MONOCYTES # BLD: 0.5 K/UL (ref 0–0.8)
MONOCYTES NFR BLD: 10.6 % (ref 0–10)
NEUTROPHILS # BLD: 2.8 K/UL (ref 1.8–7)
NEUTROPHILS NFR BLD AUTO: 65.5 % (ref 50–75)
NRBC BLD AUTO-RTO: 0.1 % (ref 0–2)
PLATELET # BLD: 335 K/UL (ref 130–400)
PMV BLD AUTO: 8.7 FL (ref 7.2–11.7)
RBC # BLD AUTO: 3.97 MIL/UL (ref 4.4–5.9)
WBC # BLD AUTO: 4.3 K/UL (ref 4.8–10.8)

## 2019-01-21 RX ADMIN — PANTOPRAZOLE SODIUM SCH MG: 40 TABLET, DELAYED RELEASE ORAL at 09:17

## 2019-01-21 RX ADMIN — WATER SCH MLS/HR: 1 INJECTION INTRAMUSCULAR; INTRAVENOUS; SUBCUTANEOUS at 09:16

## 2019-01-21 RX ADMIN — FLUCONAZOLE SCH MLS/HR: 200 INJECTION, SOLUTION INTRAVENOUS at 16:54

## 2019-01-21 NOTE — MRI
Date of service: 



01/19/2019



PROCEDURE:  



HISTORY:

Recurrent UTI, R/O prostatic abscess.



COMPARISON:

None



TECHNIQUE:





FINDINGS:

The prostate gland measures 5.5 x 5.7 x 5.6 centimeters.  The 

membranous urethra measures 12 millimeters.  There is extensive 

benign prostatic hyperplasia with median lobe hypertrophy intruding 

into the bladder base.  There is signal heterogeneity present without 

significant restricted diffusion.  There is no internal fluid 

collection.  There is diffuse signal heterogeneity of the peripheral 

zone without discrete focal mass.  No significant restricted 

diffusion is observed.



There is no periprostatic fat infiltration.  No fluid collection is 

observed within the peripheral zone.



The seminal vesicles are symmetric.  There is no significant pelvic 

lymphadenopathy.  The urinary bladder is unremarkable.  There is no 

pelvic lymphadenopathy.  The bowel loops have a normal appearance.  

No suspicious skeletal lesions are observed.



IMPRESSION:

No evidence of intra prostatic or periprostatic abscess.



Signal heterogeneity throughout the peripheral zone without discrete 

focal mass or significant restricted diffusion.



PIRADS 2 : CLINICALLY SIGNIFICANT CANCER IS NOT LIKELY.

## 2019-01-21 NOTE — CP.PCM.PN
Subjective





- Date & Time of Evaluation


Date of Evaluation: 01/21/19


Time of Evaluation: 18:20





- Subjective


Subjective: 





CHIEF COMPLAINTS  TODAY :


TEMPERATURE DOWN,


VSS


LESS ABDOMINAL OUSAMNE DISCOMFORT


no new complaints.











ROS.


HEENT :  N.


Resp :       No  SOB wheezing, cough 


Cardio :     No CP, PND orthopnea 


GI :           +VE ABD. PAIN, NO n/v 


CNS : No headache , focal deficit. 


Musculoskel :  N


Ext. : Pedal pulses intact, no edema or calf pain 


Derm :        N


Psych :     N. 





PE.


Pt. is alert awake in no distress.


V.S  As noted in the chart 


Head ,ear nose,throat and eyes : Normal.


Neck : Supple with normal carotids.


Lungs: Clear air entry.


Heart : S1 & S2 normal . . No murmur. S4 + 


Abd : SOFT , LOWER ABDOMINAL /TOMASA-UMBLICAL DISCOMFOR 


with normal bowel sounds.


PT WEARING A BINDER FOR SUPPORT


Neuro : Moves all ext. with no localized deficit.


Ext : No edema with intact pulses. Neg. calf tenderness 


Derm : No rashes or decubitus ulcer.





Radiology/Labs .


REVIEWED


PELVIC MRI NOTED. 





NO EVIDENCE OF INTRA PROSTATIC/OR PERIPROSTATIC ABSCESS.


REPEAT URINE CULURE +VE YEAST.








Objective





- Vital Signs/Intake and Output


Vital Signs (last 24 hours): 


                                        











Temp Pulse Resp BP Pulse Ox


 


 97.7 F   71   20   149/72   97 


 


 01/21/19 15:49  01/21/19 18:04  01/21/19 18:04  01/21/19 18:04  01/21/19 15:49








Intake and Output: 


                                        











 01/21/19 01/21/19





 06:59 18:59


 


Intake Total  450


 


Balance  450














- Medications


Medications: 


                               Current Medications





Aspirin (Ecotrin)  81 mg PO DAILY [facility]


   Last Admin: 01/21/19 09:17 Dose:  81 mg


Carvedilol (Coreg)  6.25 mg PO BID [facility]


   Last Admin: 01/21/19 18:07 Dose:  6.25 mg


Docusate Sodium (Colace)  100 mg PO BID [facility]


   Last Admin: 01/21/19 18:07 Dose:  100 mg


Ferrous Sulfate (Feosol)  325 mg PO BID [facility]


   Last Admin: 01/21/19 18:07 Dose:  325 mg


Finasteride (Proscar)  5 mg PO DAILY [facility]


   Last Admin: 01/21/19 09:17 Dose:  5 mg


Furosemide (Lasix)  40 mg PO DAILY [facility]


   Last Admin: 01/21/19 09:19 Dose:  Not Given


Fluconazole (Diflucan Iv 200 Mg/100 Ml Ns)  100 mls @ 100 mls/hr IVPB Q24H [facility]; 

Protocol


   Last Admin: 01/21/19 16:54 Dose:  100 mls/hr


Losartan Potassium (Cozaar)  50 mg PO DAILY [facility]


   Last Admin: 01/21/19 09:17 Dose:  50 mg


Ondansetron HCl (Zofran Tab)  4 mg PO Q6 PRN


   PRN Reason: Nausea/Vomiting


   Last Admin: 01/18/19 12:53 Dose:  4 mg


Pantoprazole Sodium (Protonix Ec Tab)  40 mg PO DAILY [facility]


   Last Admin: 01/21/19 09:17 Dose:  40 mg


Rosuvastatin Calcium (Crestor)  10 mg PO HS [facility]


   Last Admin: 01/20/19 21:36 Dose:  10 mg


Tamsulosin HCl (Flomax)  0.4 mg PO DAILY [facility]


   Last Admin: 01/21/19 09:17 Dose:  0.4 mg


Ticagrelor (Brilinta)  90 mg PO BID [facility]


   Last Admin: 01/21/19 18:07 Dose:  90 mg











- Labs


Labs: 


                                        





                                 01/21/19 08:06 





                                 01/21/19 08:06 











Assessment and Plan


(1) Complicated UTI (urinary tract infection)


Status: Acute   





(2) Abdominal pain


Status: Acute   





(3) Kidney stone


Status: Acute   





(4) BPH (benign prostatic hypertrophy)


Status: Chronic   





(5) Diabetes mellitus


Status: Acute   





- Assessment and Plan (Free Text)


Plan: 





CONTINUE iv ANTIBIOTICS.





DC IV  MERREM 1 G EVERY 12HRLY  1/14/19 ( 1/19/19 )


DC IV FLAGYL 500MG IVPB [id number] 1/18/19








ON IV DIFLUCAN 200MG IVPB DAILY 1/19/19  DAY3





SWITCH TO PO IN AM 200MG DIFLUCAN PO OD DAILY X 5 DAYS.


CASE DISCUSSED WITH ATTENDING.





PT TO F/U WITH PMD .

## 2019-01-21 NOTE — CP.PCM.PN
Subjective





- Date & Time of Evaluation


Date of Evaluation: 01/21/19


Time of Evaluation: 11:34





- Subjective


Subjective: 





                                    Afebrle. MRI report pending. WBC ct shows 

lymphocytosis, and monocytosisi. ON IV Diflucan, Merrem, and Flagyl.





Objective





- Vital Signs/Intake and Output


Vital Signs (last 24 hours): 


                                        











Temp Pulse Resp BP Pulse Ox


 


 97.9 F   82   20   116/75   97 


 


 01/21/19 08:44  01/21/19 09:26  01/21/19 08:44  01/21/19 09:26  01/21/19 09:26











- Medications


Medications: 


                               Current Medications





Aspirin (Ecotrin)  81 mg PO DAILY [facility]


   Last Admin: 01/21/19 09:17 Dose:  81 mg


Carvedilol (Coreg)  6.25 mg PO BID [facility]


   Last Admin: 01/21/19 09:17 Dose:  6.25 mg


Docusate Sodium (Colace)  100 mg PO BID [facility]


   Last Admin: 01/21/19 09:17 Dose:  100 mg


Ferrous Sulfate (Feosol)  325 mg PO BID [facility]


   Last Admin: 01/21/19 09:17 Dose:  325 mg


Finasteride (Proscar)  5 mg PO DAILY [facility]


   Last Admin: 01/21/19 09:17 Dose:  5 mg


Furosemide (Lasix)  40 mg PO DAILY [facility]


   Last Admin: 01/21/19 09:19 Dose:  Not Given


Meropenem 1 gm/ Sodium (Chloride)  100 mls @ 100 mls/hr IVPB Q12H [facility]; Protocol


   Last Admin: 01/21/19 04:31 Dose:  100 mls/hr


Fluconazole (Diflucan Iv 200 Mg/100 Ml Ns)  100 mls @ 100 mls/hr IVPB Q24H [facility]; 

Protocol


   Last Admin: 01/20/19 19:13 Dose:  100 mls/hr


Metronidazole (Flagyl)  500 mg in 100 mls @ 100 mls/hr IVPB Q12 [facility]; Protocol


   Last Admin: 01/21/19 09:16 Dose:  100 mls/hr


Losartan Potassium (Cozaar)  50 mg PO DAILY [facility]


   Last Admin: 01/21/19 09:17 Dose:  50 mg


Ondansetron HCl (Zofran Tab)  4 mg PO Q6 PRN


   PRN Reason: Nausea/Vomiting


   Last Admin: 01/18/19 12:53 Dose:  4 mg


Pantoprazole Sodium (Protonix Ec Tab)  40 mg PO DAILY [facility]


   Last Admin: 01/21/19 09:17 Dose:  40 mg


Rosuvastatin Calcium (Crestor)  10 mg PO HS [facility]


   Last Admin: 01/20/19 21:36 Dose:  10 mg


Tamsulosin HCl (Flomax)  0.4 mg PO DAILY [facility]


   Last Admin: 01/21/19 09:17 Dose:  0.4 mg


Ticagrelor (Brilinta)  90 mg PO BID [facility]


   Last Admin: 01/21/19 09:17 Dose:  90 mg











- Labs


Labs: 


                                        





                                 01/21/19 08:06 





                                 01/21/19 08:06 











- Constitutional


Appears: No Acute Distress





- Head Exam


Head Exam: ATRAUMATIC, NORMAL INSPECTION, NORMOCEPHALIC





- Eye Exam


Eye Exam: EOMI, Normal appearance


Pupil Exam: PERRL





- ENT Exam


ENT Exam: Mucous Membranes Moist





- Neck Exam


Neck Exam: Full ROM





- Respiratory Exam


Respiratory Exam: Clear to Ausculation Bilateral, NORMAL BREATHING PATTERN





- Cardiovascular Exam


Cardiovascular Exam: REGULAR RHYTHM, +S1, +S2





- GI/Abdominal Exam


GI & Abdominal Exam: Soft





- Rectal Exam


Rectal Exam: Deferred





- Extremities Exam


Extremities Exam: Full ROM





- Back Exam


Back Exam: Full ROM, NORMAL INSPECTION





- Neurological Exam


Neurological Exam: Alert, Awake, Oriented x3





- Psychiatric Exam


Psychiatric exam: Normal Affect, Normal Mood





- Skin


Skin Exam: Intact, Normal Color, Warm





Assessment and Plan


(1) Acute pyelonephritis


Status: Acute   





(2) Abdominal pain


Status: Acute   





(3) Anemia associated with acute blood loss


Status: Chronic   





(4) Hypertension


Status: Chronic   





(5) Pre-diabetes


Status: Chronic   





(6) Ventral hernia without obstruction or gangrene


Status: Chronic   





- Assessment and Plan (Free Text)


Assessment: 





                                      Assessment:





        


         Acute UTI





       CAD





      HTN





      BPH





      Prediabetes.


Plan: 





                                   Plan:





 


         Continue IV MErrem, Flagyl and Diflucan


.

## 2019-01-22 VITALS — OXYGEN SATURATION: 97 % | TEMPERATURE: 97.8 F | RESPIRATION RATE: 20 BRPM

## 2019-01-22 VITALS — HEART RATE: 89 BPM | SYSTOLIC BLOOD PRESSURE: 126 MMHG | DIASTOLIC BLOOD PRESSURE: 73 MMHG

## 2019-01-22 RX ADMIN — PANTOPRAZOLE SODIUM SCH MG: 40 TABLET, DELAYED RELEASE ORAL at 09:12

## 2019-01-22 NOTE — PCM.URO
Urology Progress Note





- General


General: No Complaints, Tolerating Diet





- Subjective


Abdominal Pain: No (Prev abd pain resolved, periumbilical.)


Flank Pain: No


Nausea: No


Vomiting: No


Voiding Well: Yes


Dysuria: No


Hematuria: No


Good Stream: No


Stone Passed: No


Dsypnea: No


Chest Pain: No


Fever & Chills: No


Other: feeling well





- Objective


Lab Studies: Reviewed (Creat=1.4 WBC=4,300)


Intake & Output: 


                                 Intake & Output











 01/21/19 01/22/19 01/22/19





 18:59 06:59 18:59


 


Intake Total 450 450 


 


Balance 450 450 


 


Intake:   


 


  Intake, IV Amount 100  


 


    Left Antecubital 100  


 


  Oral 350 450 


 


Other:   


 


  # Voids   


 


    Urine, Voided 1 4 


 


  # Bowel Movements 0 0 











Vital Signs: 


                               Vital Signs - 24 hr











  01/21/19 01/21/19 01/21/19





  09:26 15:49 18:04


 


Temperature  97.7 F 


 


Pulse Rate 82 77 71


 


Respiratory  20 20





Rate   


 


Blood Pressure 116/75 120/77 149/72


 


O2 Sat by Pulse 97 97 





Oximetry   














  01/21/19 01/22/19





  23:30 07:15


 


Temperature 97.7 F 97.8 F


 


Pulse Rate 98 H 88


 


Respiratory 18 20





Rate  


 


Blood Pressure 130/77 145/82


 


O2 Sat by Pulse 98 97





Oximetry  











Imaging Studies: Reviewed (CT scan reveals 2 small LLP renal calculi, 3mm and 

6mm. Ventral hernia, containing bowel, is present.)





- Physical Exam


Abdominal Exam: Soft, Non-Tender, Non-Distended


Back: No CVA Tenderness





- Plan


Additional Information: IMP:  uti, clinically improved.  + cultures, Ecoli and 

thern yeast.  Ventral hernia.  Urolithiasis.  P/Rec:  Antibiotic as per ID.  

Will discuss re hernia and stones.  YS





- Date & Time of Note


Date: 01/21/19


Time: 09:20

## 2019-01-22 NOTE — RAD
Date of service: 



01/22/2019



HISTORY:

 Urolithiasis. L renal stones 



COMPARISON:

11/26/2018



FINDINGS:



BOWEL:

Left and right stool retention.  No obstruction. No free air. 



BONES:

Mild lumbar marginal [id number] facet hypertrophic arthrosis.



OTHER FINDINGS:

The prior left ureteral stent is no longer present.  Prior 

conglomerate hyperdensity compatible with prior left renal clumping 

calculi are no longer seen as such.



Bilateral hemipelvic vascular calcifications and descending abdominal 

aortic vascular calcifications noted



IMPRESSION:

Interval left ureteral stent removal.  Prior left clumping renal 

calculi appearance also no longer seen.



Atherosclerotic vascular calcifications present.

## 2022-04-20 NOTE — PCM.URO
Urology Progress Note





- General


General: No Complaints, Tolerating Diet





- Subjective


Abdominal Pain: No


Nausea: No (Taking well po.. Had BM)


Vomiting: No


Hematuria: No


Dsypnea: No


Chest Pain: No


Fever & Chills: No





- Objective


Lab Studies: Reviewed


Intake & Output: 


 Intake & Output











 08/07/18 08/08/18 08/08/18





 18:59 06:59 18:59


 


Intake Total 530 1540 


 


Output Total 700 2900 


 


Balance -170 -1360 


 


Intake:   


 


  Intake, IV Amount 530 960 


 


    Left Antecubital 530 960 


 


  Oral  580 


 


Output:   


 


  Urine 700 2900 


 


    Urethral ([place]) 700 2900 


 


Other:   


 


  # Bowel Movements  1 











Vital Signs: 


 Vital Signs - 24 hr











  08/07/18 08/08/18 08/08/18





  15:00 00:30 00:52


 


Temperature 98.7 F 98.5 F 98.7 F


 


Pulse Rate 61 70 


 


Respiratory 20 20 





Rate   


 


Blood Pressure 142/68 155/80 H 


 


O2 Sat by Pulse 99 98 





Oximetry   














  08/08/18





  08:00


 


Temperature 97.3 F L


 


Pulse Rate 74


 


Respiratory 20





Rate 


 


Blood Pressure 142/73


 


O2 Sat by Pulse 98





Oximetry 














- Physical Exam


Abdominal Exam: Soft, Non-Tender, Non-Distended


Back: No CVA Tenderness


Genitalia: Without Inflammation


Urinary Catheter Draining Well: Yes


Urine Color: Yellow





- Plan


Catheter Care: Yes


Additional Information: IMP:  doing well.  P?Rec:  de leon cath in place.  Path 

pending.  resume anticoag.  Discussed w primary MD and w pt.  Home today.  

outpt f/u





- Date & Time of Note


Date: 08/08/18


Time: 13:02 20-Apr-2022 15:06

## 2024-02-28 ENCOUNTER — NEW PATIENT COMPREHENSIVE (OUTPATIENT)
Dept: URBAN - METROPOLITAN AREA CLINIC 110 | Facility: CLINIC | Age: 74
End: 2024-02-28

## 2024-02-28 DIAGNOSIS — H40.1133: ICD-10-CM

## 2024-02-28 DIAGNOSIS — H43.813: ICD-10-CM

## 2024-02-28 DIAGNOSIS — H04.123: ICD-10-CM

## 2024-02-28 PROCEDURE — 76514 ECHO EXAM OF EYE THICKNESS: CPT

## 2024-02-28 PROCEDURE — G2211 COMPLEX E/M VISIT ADD ON: HCPCS

## 2024-02-28 PROCEDURE — 92020 GONIOSCOPY: CPT

## 2024-02-28 PROCEDURE — 99205 OFFICE O/P NEW HI 60 MIN: CPT

## 2024-02-28 PROCEDURE — 92133 CPTRZD OPH DX IMG PST SGM ON: CPT

## 2024-02-28 ASSESSMENT — PACHYMETRY
OS_CT_UM: 483
OD_CT_UM: 492

## 2024-02-28 ASSESSMENT — TONOMETRY
OS_IOP_MMHG: 15
OD_IOP_MMHG: 28

## 2024-02-28 ASSESSMENT — VISUAL ACUITY
OU_CC: J1-2
OD_CC: 20/70
OS_CC: 20/40-1
OD_PH: 20/60

## 2024-04-01 ENCOUNTER — SURGERY/PROCEDURE (OUTPATIENT)
Dept: URBAN - METROPOLITAN AREA SURGICAL CENTER 32 | Facility: SURGICAL CENTER | Age: 74
End: 2024-04-01

## 2024-04-01 DIAGNOSIS — H40.1133: ICD-10-CM

## 2024-04-01 PROCEDURE — 66180 AQUEOUS SHUNT EYE W/GRAFT: CPT

## 2024-04-02 ENCOUNTER — 1 DAY POST-OP (OUTPATIENT)
Dept: URBAN - METROPOLITAN AREA CLINIC 21 | Facility: CLINIC | Age: 74
End: 2024-04-02

## 2024-04-02 DIAGNOSIS — Z98.890: ICD-10-CM

## 2024-04-02 PROCEDURE — 99024 POSTOP FOLLOW-UP VISIT: CPT

## 2024-04-02 ASSESSMENT — VISUAL ACUITY
OD_CC: 20/60
OD_PH: 20/50-2

## 2024-04-02 ASSESSMENT — TONOMETRY
OD_IOP_MMHG: 15
OS_IOP_MMHG: 17

## 2024-04-09 ENCOUNTER — 1 WEEK POST-OP (OUTPATIENT)
Dept: URBAN - METROPOLITAN AREA CLINIC 21 | Facility: CLINIC | Age: 74
End: 2024-04-09

## 2024-04-09 DIAGNOSIS — Z98.890: ICD-10-CM

## 2024-04-09 PROCEDURE — 99024 POSTOP FOLLOW-UP VISIT: CPT

## 2024-04-09 ASSESSMENT — VISUAL ACUITY
OD_CC: 20/60
OS_CC: 20/30

## 2024-04-09 ASSESSMENT — TONOMETRY
OS_IOP_MMHG: 12
OD_IOP_MMHG: 21

## 2024-05-10 ENCOUNTER — 1 MONTH POST-OP (OUTPATIENT)
Dept: URBAN - METROPOLITAN AREA CLINIC 21 | Facility: CLINIC | Age: 74
End: 2024-05-10

## 2024-05-10 DIAGNOSIS — Z98.890: ICD-10-CM

## 2024-05-10 PROCEDURE — 99024 POSTOP FOLLOW-UP VISIT: CPT

## 2024-05-10 ASSESSMENT — VISUAL ACUITY
OS_CC: 20/30+2
OD_CC: 20/50-2

## 2024-05-10 ASSESSMENT — TONOMETRY
OD_IOP_MMHG: 16
OS_IOP_MMHG: 12

## 2024-07-09 ENCOUNTER — POST-OP CHECK (OUTPATIENT)
Dept: URBAN - METROPOLITAN AREA CLINIC 21 | Facility: CLINIC | Age: 74
End: 2024-07-09

## 2024-07-09 DIAGNOSIS — H40.1133: ICD-10-CM

## 2024-07-09 DIAGNOSIS — H02.411: ICD-10-CM

## 2024-07-09 PROCEDURE — 99024 POSTOP FOLLOW-UP VISIT: CPT

## 2024-07-09 ASSESSMENT — VISUAL ACUITY
OS_CC: 20/30-2
OD_CC: 20/60
OD_PH: 20/50-3

## 2024-07-09 ASSESSMENT — TONOMETRY
OS_IOP_MMHG: 16
OD_IOP_MMHG: 14

## 2024-08-10 NOTE — RAD
Date of service: 



11/24/2018



HISTORY:

 Pt going to OR 



COMPARISON:

8/24/2018 



FINDINGS:



LUNGS:

No active pulmonary disease.



PLEURA:

No significant pleural effusion identified, no pneumothorax apparent.



CARDIOVASCULAR:

No aortic atherosclerotic calcification present.



Normal cardiac size. No pulmonary vascular congestion. 



OSSEOUS STRUCTURES:

No significant abnormalities.



VISUALIZED UPPER ABDOMEN:

Normal.



OTHER FINDINGS:

None.



IMPRESSION:

No active disease. Yes

## 2024-08-28 ENCOUNTER — APPOINTMENT (OUTPATIENT)
Facility: HOSPITAL | Age: 74
End: 2024-08-28
Payer: MEDICARE

## 2024-08-28 ENCOUNTER — HOSPITAL ENCOUNTER (EMERGENCY)
Facility: HOSPITAL | Age: 74
Discharge: ANOTHER HOSPITAL | End: 2024-08-29
Attending: EMERGENCY MEDICINE
Payer: MEDICARE

## 2024-08-28 DIAGNOSIS — R31.0 GROSS HEMATURIA: Primary | ICD-10-CM

## 2024-08-28 DIAGNOSIS — N13.9 ACUTE BILATERAL OBSTRUCTIVE UROPATHY: ICD-10-CM

## 2024-08-28 LAB
ALBUMIN SERPL-MCNC: 3 G/DL (ref 3.5–5)
ALBUMIN/GLOB SERPL: 0.8 (ref 1.1–2.2)
ALP SERPL-CCNC: 62 U/L (ref 45–117)
ALT SERPL-CCNC: 15 U/L (ref 12–78)
ANION GAP SERPL CALC-SCNC: 10 MMOL/L (ref 5–15)
AST SERPL W P-5'-P-CCNC: 20 U/L (ref 15–37)
BASOPHILS # BLD: 0 K/UL (ref 0–0.1)
BASOPHILS NFR BLD: 0 % (ref 0–1)
BILIRUB DIRECT SERPL-MCNC: 0.3 MG/DL (ref 0–0.2)
BILIRUB SERPL-MCNC: 1 MG/DL (ref 0.2–1)
BUN SERPL-MCNC: 19 MG/DL (ref 6–20)
BUN/CREAT SERPL: 13 (ref 12–20)
CA-I BLD-MCNC: 9.1 MG/DL (ref 8.5–10.1)
CHLORIDE SERPL-SCNC: 101 MMOL/L (ref 97–108)
CO2 SERPL-SCNC: 22 MMOL/L (ref 21–32)
CREAT SERPL-MCNC: 1.45 MG/DL (ref 0.7–1.3)
DIFFERENTIAL METHOD BLD: ABNORMAL
EOSINOPHIL # BLD: 0 K/UL (ref 0–0.4)
EOSINOPHIL NFR BLD: 0 % (ref 0–7)
ERYTHROCYTE [DISTWIDTH] IN BLOOD BY AUTOMATED COUNT: 13.6 % (ref 11.5–14.5)
GLOBULIN SER CALC-MCNC: 4 G/DL (ref 2–4)
GLUCOSE SERPL-MCNC: 131 MG/DL (ref 65–100)
HCT VFR BLD AUTO: 39.1 % (ref 36.6–50.3)
HGB BLD-MCNC: 12.6 G/DL (ref 12.1–17)
IMM GRANULOCYTES # BLD AUTO: 0 K/UL (ref 0–0.04)
IMM GRANULOCYTES NFR BLD AUTO: 0 % (ref 0–0.5)
LYMPHOCYTES # BLD: 0.6 K/UL (ref 0.8–3.5)
LYMPHOCYTES NFR BLD: 4 % (ref 12–49)
MCH RBC QN AUTO: 27.9 PG (ref 26–34)
MCHC RBC AUTO-ENTMCNC: 32.2 G/DL (ref 30–36.5)
MCV RBC AUTO: 86.5 FL (ref 80–99)
MONOCYTES # BLD: 0.7 K/UL (ref 0–1)
MONOCYTES NFR BLD: 5 % (ref 5–13)
NEUTS SEG # BLD: 13.6 K/UL (ref 1.8–8)
NEUTS SEG NFR BLD: 91 % (ref 32–75)
NRBC # BLD: 0 K/UL (ref 0–0.01)
NRBC BLD-RTO: 0 PER 100 WBC
PLATELET # BLD AUTO: 195 K/UL (ref 150–400)
PMV BLD AUTO: 11.2 FL (ref 8.9–12.9)
POTASSIUM SERPL-SCNC: 3.9 MMOL/L (ref 3.5–5.1)
PROT SERPL-MCNC: 7 G/DL (ref 6.4–8.2)
RBC # BLD AUTO: 4.52 M/UL (ref 4.1–5.7)
RBC MORPH BLD: ABNORMAL
SODIUM SERPL-SCNC: 133 MMOL/L (ref 136–145)
WBC # BLD AUTO: 14.9 K/UL (ref 4.1–11.1)

## 2024-08-28 PROCEDURE — 6360000002 HC RX W HCPCS: Performed by: EMERGENCY MEDICINE

## 2024-08-28 PROCEDURE — 74176 CT ABD & PELVIS W/O CONTRAST: CPT

## 2024-08-28 PROCEDURE — 99285 EMERGENCY DEPT VISIT HI MDM: CPT

## 2024-08-28 PROCEDURE — 80048 BASIC METABOLIC PNL TOTAL CA: CPT

## 2024-08-28 PROCEDURE — 85025 COMPLETE CBC W/AUTO DIFF WBC: CPT

## 2024-08-28 PROCEDURE — 96375 TX/PRO/DX INJ NEW DRUG ADDON: CPT

## 2024-08-28 PROCEDURE — 96374 THER/PROPH/DIAG INJ IV PUSH: CPT

## 2024-08-28 PROCEDURE — 36415 COLL VENOUS BLD VENIPUNCTURE: CPT

## 2024-08-28 PROCEDURE — 80076 HEPATIC FUNCTION PANEL: CPT

## 2024-08-28 RX ORDER — MORPHINE SULFATE 4 MG/ML
4 INJECTION, SOLUTION INTRAMUSCULAR; INTRAVENOUS
Status: COMPLETED | OUTPATIENT
Start: 2024-08-28 | End: 2024-08-28

## 2024-08-28 RX ORDER — ONDANSETRON 2 MG/ML
4 INJECTION INTRAMUSCULAR; INTRAVENOUS ONCE
Status: COMPLETED | OUTPATIENT
Start: 2024-08-28 | End: 2024-08-28

## 2024-08-28 RX ADMIN — ONDANSETRON 4 MG: 2 INJECTION INTRAMUSCULAR; INTRAVENOUS at 17:31

## 2024-08-28 RX ADMIN — MORPHINE SULFATE 4 MG: 4 INJECTION, SOLUTION INTRAMUSCULAR; INTRAVENOUS at 17:32

## 2024-08-28 ASSESSMENT — PAIN DESCRIPTION - ORIENTATION: ORIENTATION: RIGHT;LEFT

## 2024-08-28 ASSESSMENT — LIFESTYLE VARIABLES
HOW MANY STANDARD DRINKS CONTAINING ALCOHOL DO YOU HAVE ON A TYPICAL DAY: PATIENT DOES NOT DRINK
HOW OFTEN DO YOU HAVE A DRINK CONTAINING ALCOHOL: NEVER

## 2024-08-28 ASSESSMENT — PAIN SCALES - GENERAL
PAINLEVEL_OUTOF10: 10
PAINLEVEL_OUTOF10: 5
PAINLEVEL_OUTOF10: 10

## 2024-08-28 ASSESSMENT — PAIN DESCRIPTION - LOCATION: LOCATION: FLANK

## 2024-08-28 ASSESSMENT — PAIN - FUNCTIONAL ASSESSMENT: PAIN_FUNCTIONAL_ASSESSMENT: 0-10

## 2024-08-28 NOTE — ED TRIAGE NOTES
Pt reports onset of bilateral flank pain x 3 days with hematuria noted today. Pt also endorses difficulty starting stream

## 2024-08-28 NOTE — ED PROVIDER NOTES
[facility] EMERGENCY DEPT  EMERGENCY DEPARTMENT HISTORY AND PHYSICAL EXAM      Date: 8/28/2024  Patient Name: CAROL Alberto  MRN: [id number]  Birthdate 1950  Date of evaluation: 8/28/2024  Provider: Marcie Becerril MD   Note Started: 4:10 PM EDT 8/28/24    HISTORY OF PRESENT ILLNESS     Chief Complaint   Patient presents with    Hematuria    Flank Pain     Bilateral        History Provided By: Patient    HPI: CAROL Alberto is a 73 y.o. male patient presents with hematuria bilateral flank pain and some suprapubic pain with some difficulty voiding.  History of enlarged prostate.  Known prostate cancer as well as history of kidney stones.  Moderate to severe pain presently.    PAST MEDICAL HISTORY   Past Medical History:  Past Medical History:   Diagnosis Date    Enlarged prostate     High cholesterol     HTN (hypertension)     Pre-diabetes        Past Surgical History:  History reviewed. No pertinent surgical history.    Family History:  History reviewed. No pertinent family history.    Social History:  Social History     Tobacco Use    Smoking status: Never    Smokeless tobacco: Never   Substance Use Topics    Alcohol use: Never    Drug use: Never       Allergies:  No Known Allergies    PCP: No, Pcp    Current Meds:   No current facility-administered medications for this encounter.     No current outpatient medications on file.     Facility-Administered Medications Ordered in Other Encounters   Medication Dose Route Frequency Provider Last Rate Last Admin    sodium chloride flush 0.9 % injection 5-40 mL  5-40 mL IntraVENous 2 times per day Kamar Cervantes DO   10 mL at 08/29/24 0829    sodium chloride flush 0.9 % injection 5-40 mL  5-40 mL IntraVENous PRN Kamar Cervantes DO        0.9 % sodium chloride infusion   IntraVENous PRN Kamar Cervantes DO        ondansetron (ZOFRAN-ODT) disintegrating tablet 4 mg  4 mg Oral Q8H PRN Kamar Cervantes DO        Or    ondansetron (ZOFRAN) injection 4 mg  4 mg

## 2024-08-28 NOTE — ED NOTES
Advised patient of Dr. Valdez Standard note. Patient verbalized understanding  Patient stated he is okay with switching over to Novolog.      Dr. Edgardo Mendiola:   Please order Novolog insulin and send to pharmacy Patient transported to CT at this time.

## 2024-08-29 ENCOUNTER — HOSPITAL ENCOUNTER (INPATIENT)
Facility: HOSPITAL | Age: 74
LOS: 3 days | Discharge: HOME | DRG: 696 | End: 2024-09-01
Attending: STUDENT IN AN ORGANIZED HEALTH CARE EDUCATION/TRAINING PROGRAM | Admitting: STUDENT IN AN ORGANIZED HEALTH CARE EDUCATION/TRAINING PROGRAM
Payer: MEDICARE

## 2024-08-29 VITALS
BODY MASS INDEX: 26.6 KG/M2 | DIASTOLIC BLOOD PRESSURE: 73 MMHG | HEART RATE: 112 BPM | RESPIRATION RATE: 18 BRPM | TEMPERATURE: 99.3 F | HEIGHT: 71 IN | WEIGHT: 190 LBS | SYSTOLIC BLOOD PRESSURE: 133 MMHG | OXYGEN SATURATION: 95 %

## 2024-08-29 DIAGNOSIS — I50.9 CHRONIC CONGESTIVE HEART FAILURE, UNSPECIFIED HEART FAILURE TYPE (HCC): Primary | ICD-10-CM

## 2024-08-29 PROBLEM — R31.0 GROSS HEMATURIA: Status: ACTIVE | Noted: 2024-08-29

## 2024-08-29 LAB
ANION GAP SERPL CALC-SCNC: 7 MMOL/L (ref 5–15)
APPEARANCE UR: ABNORMAL
APTT PPP: 32.6 SEC (ref 22.1–31)
BACTERIA URNS QL MICRO: ABNORMAL /HPF
BASOPHILS # BLD: 0 K/UL (ref 0–0.1)
BASOPHILS NFR BLD: 0 % (ref 0–1)
BILIRUB UR QL CFM: POSITIVE
BUN SERPL-MCNC: 20 MG/DL (ref 6–20)
BUN/CREAT SERPL: 15 (ref 12–20)
CALCIUM SERPL-MCNC: 9.3 MG/DL (ref 8.5–10.1)
CHLORIDE SERPL-SCNC: 102 MMOL/L (ref 97–108)
CO2 SERPL-SCNC: 27 MMOL/L (ref 21–32)
COLOR UR: ABNORMAL
CREAT SERPL-MCNC: 1.35 MG/DL (ref 0.7–1.3)
DIFFERENTIAL METHOD BLD: ABNORMAL
EOSINOPHIL # BLD: 0 K/UL (ref 0–0.4)
EOSINOPHIL NFR BLD: 0 % (ref 0–7)
EPITH CASTS URNS QL MICRO: ABNORMAL /LPF
ERYTHROCYTE [DISTWIDTH] IN BLOOD BY AUTOMATED COUNT: 13.7 % (ref 11.5–14.5)
GLUCOSE SERPL-MCNC: 108 MG/DL (ref 65–100)
GLUCOSE UR STRIP.AUTO-MCNC: NEGATIVE MG/DL
HCT VFR BLD AUTO: 36.6 % (ref 36.6–50.3)
HGB BLD-MCNC: 11.9 G/DL (ref 12.1–17)
HGB UR QL STRIP: ABNORMAL
IMM GRANULOCYTES # BLD AUTO: 0 K/UL (ref 0–0.04)
IMM GRANULOCYTES NFR BLD AUTO: 0 % (ref 0–0.5)
INR PPP: 1.2 (ref 0.9–1.1)
KETONES UR QL STRIP.AUTO: NEGATIVE MG/DL
LEUKOCYTE ESTERASE UR QL STRIP.AUTO: ABNORMAL
LYMPHOCYTES # BLD: 0.6 K/UL (ref 0.8–3.5)
LYMPHOCYTES NFR BLD: 5 % (ref 12–49)
MCH RBC QN AUTO: 27.7 PG (ref 26–34)
MCHC RBC AUTO-ENTMCNC: 32.5 G/DL (ref 30–36.5)
MCV RBC AUTO: 85.1 FL (ref 80–99)
MONOCYTES # BLD: 0.9 K/UL (ref 0–1)
MONOCYTES NFR BLD: 8 % (ref 5–13)
NEUTS SEG # BLD: 9.9 K/UL (ref 1.8–8)
NEUTS SEG NFR BLD: 87 % (ref 32–75)
NITRITE UR QL STRIP.AUTO: POSITIVE
NRBC # BLD: 0 K/UL (ref 0–0.01)
NRBC BLD-RTO: 0 PER 100 WBC
PH UR STRIP: 5.5 (ref 5–8)
PLATELET # BLD AUTO: 189 K/UL (ref 150–400)
PMV BLD AUTO: 10.5 FL (ref 8.9–12.9)
POTASSIUM SERPL-SCNC: 3.6 MMOL/L (ref 3.5–5.1)
PROT UR STRIP-MCNC: 100 MG/DL
PROTHROMBIN TIME: 12.2 SEC (ref 9–11.1)
RBC # BLD AUTO: 4.3 M/UL (ref 4.1–5.7)
RBC #/AREA URNS HPF: >100 /HPF (ref 0–5)
RBC MORPH BLD: ABNORMAL
SODIUM SERPL-SCNC: 136 MMOL/L (ref 136–145)
SP GR UR REFRACTOMETRY: 1.01 (ref 1–1.03)
THERAPEUTIC RANGE: ABNORMAL SECS (ref 58–77)
URINE CULTURE IF INDICATED: ABNORMAL
UROBILINOGEN UR QL STRIP.AUTO: 0.2 EU/DL (ref 0.2–1)
WBC # BLD AUTO: 11.4 K/UL (ref 4.1–11.1)
WBC MORPH BLD: ABNORMAL
WBC URNS QL MICRO: ABNORMAL /HPF (ref 0–4)

## 2024-08-29 PROCEDURE — 85730 THROMBOPLASTIN TIME PARTIAL: CPT

## 2024-08-29 PROCEDURE — 80048 BASIC METABOLIC PNL TOTAL CA: CPT

## 2024-08-29 PROCEDURE — 85610 PROTHROMBIN TIME: CPT

## 2024-08-29 PROCEDURE — 87088 URINE BACTERIA CULTURE: CPT

## 2024-08-29 PROCEDURE — 81001 URINALYSIS AUTO W/SCOPE: CPT

## 2024-08-29 PROCEDURE — 2060000000 HC ICU INTERMEDIATE R&B

## 2024-08-29 PROCEDURE — 36415 COLL VENOUS BLD VENIPUNCTURE: CPT

## 2024-08-29 PROCEDURE — 6370000000 HC RX 637 (ALT 250 FOR IP): Performed by: STUDENT IN AN ORGANIZED HEALTH CARE EDUCATION/TRAINING PROGRAM

## 2024-08-29 PROCEDURE — 6360000002 HC RX W HCPCS: Performed by: STUDENT IN AN ORGANIZED HEALTH CARE EDUCATION/TRAINING PROGRAM

## 2024-08-29 PROCEDURE — 85025 COMPLETE CBC W/AUTO DIFF WBC: CPT

## 2024-08-29 PROCEDURE — 87086 URINE CULTURE/COLONY COUNT: CPT

## 2024-08-29 PROCEDURE — 2580000003 HC RX 258: Performed by: STUDENT IN AN ORGANIZED HEALTH CARE EDUCATION/TRAINING PROGRAM

## 2024-08-29 PROCEDURE — 87186 SC STD MICRODIL/AGAR DIL: CPT

## 2024-08-29 PROCEDURE — 51700 IRRIGATION OF BLADDER: CPT

## 2024-08-29 RX ORDER — CARVEDILOL 12.5 MG/1
25 TABLET ORAL 2 TIMES DAILY WITH MEALS
Status: DISCONTINUED | OUTPATIENT
Start: 2024-08-29 | End: 2024-08-29

## 2024-08-29 RX ORDER — SODIUM CHLORIDE 0.9 % (FLUSH) 0.9 %
5-40 SYRINGE (ML) INJECTION PRN
Status: DISCONTINUED | OUTPATIENT
Start: 2024-08-29 | End: 2024-09-01 | Stop reason: HOSPADM

## 2024-08-29 RX ORDER — PANTOPRAZOLE SODIUM 40 MG/1
40 TABLET, DELAYED RELEASE ORAL DAILY
Status: DISCONTINUED | OUTPATIENT
Start: 2024-08-29 | End: 2024-09-01 | Stop reason: HOSPADM

## 2024-08-29 RX ORDER — VALSARTAN 80 MG/1
80 TABLET ORAL DAILY
Status: ON HOLD | COMMUNITY
End: 2024-09-01 | Stop reason: HOSPADM

## 2024-08-29 RX ORDER — SODIUM CHLORIDE 9 MG/ML
INJECTION, SOLUTION INTRAVENOUS PRN
Status: DISCONTINUED | OUTPATIENT
Start: 2024-08-29 | End: 2024-09-01 | Stop reason: HOSPADM

## 2024-08-29 RX ORDER — ACETAMINOPHEN 650 MG/1
650 SUPPOSITORY RECTAL EVERY 6 HOURS PRN
Status: DISCONTINUED | OUTPATIENT
Start: 2024-08-29 | End: 2024-09-01 | Stop reason: HOSPADM

## 2024-08-29 RX ORDER — PANTOPRAZOLE SODIUM 20 MG/1
20 TABLET, DELAYED RELEASE ORAL DAILY
COMMUNITY

## 2024-08-29 RX ORDER — TAMSULOSIN HYDROCHLORIDE 0.4 MG/1
0.4 CAPSULE ORAL 2 TIMES DAILY
COMMUNITY

## 2024-08-29 RX ORDER — SODIUM CHLORIDE 0.9 % (FLUSH) 0.9 %
5-40 SYRINGE (ML) INJECTION EVERY 12 HOURS SCHEDULED
Status: DISCONTINUED | OUTPATIENT
Start: 2024-08-29 | End: 2024-09-01 | Stop reason: HOSPADM

## 2024-08-29 RX ORDER — LANOLIN ALCOHOL/MO/W.PET/CERES
3 CREAM (GRAM) TOPICAL NIGHTLY PRN
Status: DISCONTINUED | OUTPATIENT
Start: 2024-08-29 | End: 2024-09-01 | Stop reason: HOSPADM

## 2024-08-29 RX ORDER — ROSUVASTATIN CALCIUM 20 MG/1
20 TABLET, COATED ORAL DAILY
COMMUNITY

## 2024-08-29 RX ORDER — ONDANSETRON 2 MG/ML
4 INJECTION INTRAMUSCULAR; INTRAVENOUS EVERY 6 HOURS PRN
Status: DISCONTINUED | OUTPATIENT
Start: 2024-08-29 | End: 2024-09-01 | Stop reason: HOSPADM

## 2024-08-29 RX ORDER — FUROSEMIDE 40 MG
40 TABLET ORAL DAILY
Status: ON HOLD | COMMUNITY
End: 2024-09-01 | Stop reason: HOSPADM

## 2024-08-29 RX ORDER — OXYCODONE HYDROCHLORIDE 5 MG/1
5 TABLET ORAL EVERY 4 HOURS PRN
Status: DISCONTINUED | OUTPATIENT
Start: 2024-08-29 | End: 2024-09-01 | Stop reason: HOSPADM

## 2024-08-29 RX ORDER — TAMSULOSIN HYDROCHLORIDE 0.4 MG/1
0.4 CAPSULE ORAL 2 TIMES DAILY
Status: DISCONTINUED | OUTPATIENT
Start: 2024-08-29 | End: 2024-09-01 | Stop reason: HOSPADM

## 2024-08-29 RX ORDER — ACETAMINOPHEN 325 MG/1
650 TABLET ORAL EVERY 6 HOURS PRN
Status: DISCONTINUED | OUTPATIENT
Start: 2024-08-29 | End: 2024-09-01 | Stop reason: HOSPADM

## 2024-08-29 RX ORDER — FUROSEMIDE 40 MG
40 TABLET ORAL DAILY
Status: DISCONTINUED | OUTPATIENT
Start: 2024-08-29 | End: 2024-09-01 | Stop reason: HOSPADM

## 2024-08-29 RX ORDER — ONDANSETRON 4 MG/1
4 TABLET, ORALLY DISINTEGRATING ORAL EVERY 8 HOURS PRN
Status: DISCONTINUED | OUTPATIENT
Start: 2024-08-29 | End: 2024-09-01 | Stop reason: HOSPADM

## 2024-08-29 RX ORDER — CARVEDILOL 25 MG/1
25 TABLET ORAL 2 TIMES DAILY WITH MEALS
Status: ON HOLD | COMMUNITY
End: 2024-09-01 | Stop reason: HOSPADM

## 2024-08-29 RX ORDER — POLYETHYLENE GLYCOL 3350 17 G/17G
17 POWDER, FOR SOLUTION ORAL DAILY PRN
Status: DISCONTINUED | OUTPATIENT
Start: 2024-08-29 | End: 2024-09-01 | Stop reason: HOSPADM

## 2024-08-29 RX ORDER — ROSUVASTATIN CALCIUM 20 MG/1
20 TABLET, COATED ORAL DAILY
Status: DISCONTINUED | OUTPATIENT
Start: 2024-08-29 | End: 2024-09-01 | Stop reason: HOSPADM

## 2024-08-29 RX ADMIN — CARVEDILOL 25 MG: 12.5 TABLET, FILM COATED ORAL at 08:29

## 2024-08-29 RX ADMIN — PANTOPRAZOLE SODIUM 40 MG: 40 TABLET, DELAYED RELEASE ORAL at 08:27

## 2024-08-29 RX ADMIN — OXYCODONE HYDROCHLORIDE 5 MG: 5 TABLET ORAL at 08:28

## 2024-08-29 RX ADMIN — SODIUM CHLORIDE, PRESERVATIVE FREE 10 ML: 5 INJECTION INTRAVENOUS at 20:51

## 2024-08-29 RX ADMIN — FUROSEMIDE 40 MG: 40 TABLET ORAL at 08:29

## 2024-08-29 RX ADMIN — ACETAMINOPHEN 650 MG: 325 TABLET ORAL at 08:28

## 2024-08-29 RX ADMIN — ROSUVASTATIN 20 MG: 20 TABLET, FILM COATED ORAL at 08:29

## 2024-08-29 RX ADMIN — TAMSULOSIN HYDROCHLORIDE 0.4 MG: 0.4 CAPSULE ORAL at 20:51

## 2024-08-29 RX ADMIN — TAMSULOSIN HYDROCHLORIDE 0.4 MG: 0.4 CAPSULE ORAL at 08:27

## 2024-08-29 RX ADMIN — CEFTRIAXONE SODIUM 2000 MG: 2 INJECTION, POWDER, FOR SOLUTION INTRAMUSCULAR; INTRAVENOUS at 06:30

## 2024-08-29 RX ADMIN — SODIUM CHLORIDE, PRESERVATIVE FREE 10 ML: 5 INJECTION INTRAVENOUS at 08:29

## 2024-08-29 ASSESSMENT — PAIN SCALES - GENERAL
PAINLEVEL_OUTOF10: 0
PAINLEVEL_OUTOF10: 5
PAINLEVEL_OUTOF10: 0
PAINLEVEL_OUTOF10: 7
PAINLEVEL_OUTOF10: 2
PAINLEVEL_OUTOF10: 2

## 2024-08-29 ASSESSMENT — PAIN DESCRIPTION - LOCATION: LOCATION: PERINEUM

## 2024-08-29 ASSESSMENT — PAIN DESCRIPTION - DESCRIPTORS: DESCRIPTORS: ACHING;CRAMPING

## 2024-08-29 NOTE — H&P
Hospitalist Admission Note    NAME:  CAROL Alberto   :  1950   MRN:  [id number]     Date/Time:  2024 3:41 AM    Patient PCP: Miranda, Pcp    ______________________________________________________________________  Given the patient's current clinical presentation, I have a high level of concern for decompensation if discharged from the emergency department.  Complex decision making was performed, which includes reviewing the patient's available past medical records, laboratory results, and x-ray films.       My assessment of this patient's clinical condition and my plan of care is as follows.    Assessment / Plan:    Active Problems:  Gross hematuria with bladder outlet obstruction  Bilateral hydroureteronephrosis secondary to above  History of prostate cancer status post radiation and hormonal therapy  CAD status post YENI  Essential hypertension  Hyperlipidemia  Elevated creatinine with unknown baseline  GERD    Plan:  Gross hematuria with bladder outlet obstruction  Bilateral hydroureteronephrosis secondary to above  History of prostate cancer status post radiation and hormonal therapy  Admit to stepdown unit  Continuous bladder irrigation  Bladder scans every 4 hours with every 4 hours manual bladder irrigation  Urology consulted, greatly appreciate their expertise  Oxycodone as needed pain  Trend hemoglobin  Continue PTA tamsulosin  Check urinalysis    CAD status post YENI  Essential hypertension  Hyperlipidemia  Continue PTA carvedilol, furosemide, rosuvastatin, Brilinta    Elevated creatinine with unknown baseline  Hold PTA valsartan out of suspicion for postrenal CYNTHIA  Trend renal function  Renally dose medications and avoid nephrotoxic agents    GERD  Continue PTA Protonix    Medical Decision Making:   I personally reviewed labs: Yes, as listed below  I personally reviewed imaging: CT abdomen pelvis  Toxic drug monitoring: None  Discussed case with: ED provider. After discussion I am in agreement that

## 2024-08-29 NOTE — ED PROVIDER NOTES
ED Course as of 08/28/24 2106   Wed Aug 28, 2024   1706 Received in sign out. Hematuria, flank and suprapubic pain.  Workup pending. [LW]   2004 CT with hydro, mass vs clot in bladder.  On Brilenta, recent stent placement. 3 burger placed with return of grossly blood urine as patient was unable to void.  He is followed by urology at home in [place] and has had cystoscopy previously.  Trending his Hbg.  Will transfer for urology and cont bladder irrigation. [LW]   2105 Accepted for transfer by Dr. Cervantes. [LW]      ED Course User Index  [LW] Kim Farias MD         CRITICAL CARE NOTE :    9:06 PM    IMPENDING DETERIORATION -Respiratory, Metabolic, and Renal  ASSOCIATED RISK FACTORS - Bleeding  MANAGEMENT- Bedside Assessment and Transfer  INTERPRETATION -  CT Scan, Blood Pressure, and Cardiac Output Measures   INTERVENTIONS - hemodynamic mgmt  CASE REVIEW - Hospitalist/Intensivist  TREATMENT RESPONSE -Stable  PERFORMED BY - Self    NOTES:  I have spent 30 minutes of critical care time involved in lab review, consultations with specialist, family decision- making, bedside attention and documentation. Time is exclusive of EKG interpretation, imaging interpretation and separately billed procedures.  During this entire length of time I was immediately available to the patient.  MD Jarrett Maxwell Lauren W, MD  08/28/24 2107

## 2024-08-29 NOTE — CONSULTS
Urology Consult    Patient: CAROL Alberto MRN: [id number]  SSN: [id number]    YOB: 1950  Age: 73 y.o.  Sex: male          Date of Consultation:  August 29, 2024  Requesting Physician: Pablo Moran MD  Reason for Consultation: hematuria            Assessment/Plan:  Gross hematuria with clot burden-now on CBI  Possible UTI  Bilateral hydronephrosis 2/2 KIRAN  Prostate cancer with history of XRT  CAD with stents  Mild CYNTHIA    -Gross hematuria exacerbated by suspected radiation cystitis on AC and possible UTI  -Hold brilinta, recommend cardiology evaluation with CAD history  -Continue empiric antibiotics while awaiting cultures  -Continue to wean CBI to clear pink urine  -Discussed with nursing continue as needed manual irrigations for visualized clots, suprapubic pain, Riojas leakage, worsening hematuria.  Only resume CBI once ensured no clot burden  -q8 H/H  -Keep n.p.o. today as he has had multiple issues with clot retention this morning.  If issues continue this afternoon will consider cystoscopy, clot evacuation, fulguration tomorrow morning or sooner    ++GH stabilizing on slow cbi, okay to eat today, NPO after MN to reevaluate     Supervising MD Dr. Cole       History of Present Illness:  Patient is a 73 y.o. male admitted 8/29/2024 to the hospital for Gross hematuria [R31.0].  He has a past medical history listed below he was transferred from [facility] after presenting with complaints of flank pain and suprapubic pain.  He was found to be in urinary retention.  Prior to ED arrival he had been having 3 days of gross hematuria with progressive clot development.  Three-way Riojas placed in the ED and large volume urine retention drained with hematuria and clots.  He was manually irrigated and started on CBI.  Prior to Riojas placement a CT abdomen pelvis revealed p.o. and subsequent bilateral hydronephrosis with clot burden in bladder.  He has a history of prostate cancer

## 2024-08-30 LAB
ANION GAP SERPL CALC-SCNC: 8 MMOL/L (ref 5–15)
BASOPHILS # BLD: 0 K/UL (ref 0–0.1)
BASOPHILS NFR BLD: 0 % (ref 0–1)
BUN SERPL-MCNC: 23 MG/DL (ref 6–20)
BUN/CREAT SERPL: 15 (ref 12–20)
CALCIUM SERPL-MCNC: 8.4 MG/DL (ref 8.5–10.1)
CHLORIDE SERPL-SCNC: 102 MMOL/L (ref 97–108)
CO2 SERPL-SCNC: 25 MMOL/L (ref 21–32)
CREAT SERPL-MCNC: 1.54 MG/DL (ref 0.7–1.3)
DIFFERENTIAL METHOD BLD: ABNORMAL
EOSINOPHIL # BLD: 0 K/UL (ref 0–0.4)
EOSINOPHIL NFR BLD: 0 % (ref 0–7)
ERYTHROCYTE [DISTWIDTH] IN BLOOD BY AUTOMATED COUNT: 13.4 % (ref 11.5–14.5)
GLUCOSE SERPL-MCNC: 119 MG/DL (ref 65–100)
HCT VFR BLD AUTO: 31.5 % (ref 36.6–50.3)
HGB BLD-MCNC: 10.3 G/DL (ref 12.1–17)
IMM GRANULOCYTES # BLD AUTO: 0.1 K/UL (ref 0–0.04)
IMM GRANULOCYTES NFR BLD AUTO: 1 % (ref 0–0.5)
LYMPHOCYTES # BLD: 0.7 K/UL (ref 0.8–3.5)
LYMPHOCYTES NFR BLD: 8 % (ref 12–49)
MAGNESIUM SERPL-MCNC: 1.9 MG/DL (ref 1.6–2.4)
MCH RBC QN AUTO: 27.9 PG (ref 26–34)
MCHC RBC AUTO-ENTMCNC: 32.7 G/DL (ref 30–36.5)
MCV RBC AUTO: 85.4 FL (ref 80–99)
MONOCYTES # BLD: 1.1 K/UL (ref 0–1)
MONOCYTES NFR BLD: 13 % (ref 5–13)
NEUTS SEG # BLD: 6.9 K/UL (ref 1.8–8)
NEUTS SEG NFR BLD: 78 % (ref 32–75)
NRBC # BLD: 0 K/UL (ref 0–0.01)
NRBC BLD-RTO: 0 PER 100 WBC
NT PRO BNP: 902 PG/ML
PLATELET # BLD AUTO: 156 K/UL (ref 150–400)
PMV BLD AUTO: 10.8 FL (ref 8.9–12.9)
POTASSIUM SERPL-SCNC: 3.3 MMOL/L (ref 3.5–5.1)
RBC # BLD AUTO: 3.69 M/UL (ref 4.1–5.7)
SODIUM SERPL-SCNC: 135 MMOL/L (ref 136–145)
WBC # BLD AUTO: 8.8 K/UL (ref 4.1–11.1)

## 2024-08-30 PROCEDURE — 83735 ASSAY OF MAGNESIUM: CPT

## 2024-08-30 PROCEDURE — 6370000000 HC RX 637 (ALT 250 FOR IP): Performed by: INTERNAL MEDICINE

## 2024-08-30 PROCEDURE — 36415 COLL VENOUS BLD VENIPUNCTURE: CPT

## 2024-08-30 PROCEDURE — 2060000000 HC ICU INTERMEDIATE R&B

## 2024-08-30 PROCEDURE — 6370000000 HC RX 637 (ALT 250 FOR IP): Performed by: STUDENT IN AN ORGANIZED HEALTH CARE EDUCATION/TRAINING PROGRAM

## 2024-08-30 PROCEDURE — 82962 GLUCOSE BLOOD TEST: CPT

## 2024-08-30 PROCEDURE — 83880 ASSAY OF NATRIURETIC PEPTIDE: CPT

## 2024-08-30 PROCEDURE — 85025 COMPLETE CBC W/AUTO DIFF WBC: CPT

## 2024-08-30 PROCEDURE — 2580000003 HC RX 258: Performed by: INTERNAL MEDICINE

## 2024-08-30 PROCEDURE — 6360000002 HC RX W HCPCS: Performed by: STUDENT IN AN ORGANIZED HEALTH CARE EDUCATION/TRAINING PROGRAM

## 2024-08-30 PROCEDURE — 6360000002 HC RX W HCPCS: Performed by: INTERNAL MEDICINE

## 2024-08-30 PROCEDURE — 80048 BASIC METABOLIC PNL TOTAL CA: CPT

## 2024-08-30 PROCEDURE — 2580000003 HC RX 258: Performed by: STUDENT IN AN ORGANIZED HEALTH CARE EDUCATION/TRAINING PROGRAM

## 2024-08-30 RX ORDER — POTASSIUM CHLORIDE 750 MG/1
40 TABLET, EXTENDED RELEASE ORAL ONCE
Status: COMPLETED | OUTPATIENT
Start: 2024-08-30 | End: 2024-08-30

## 2024-08-30 RX ADMIN — SODIUM CHLORIDE 1000 MG: 900 INJECTION INTRAVENOUS at 05:56

## 2024-08-30 RX ADMIN — SODIUM CHLORIDE, PRESERVATIVE FREE 10 ML: 5 INJECTION INTRAVENOUS at 21:28

## 2024-08-30 RX ADMIN — ACETAMINOPHEN 650 MG: 325 TABLET ORAL at 01:57

## 2024-08-30 RX ADMIN — PANTOPRAZOLE SODIUM 40 MG: 40 TABLET, DELAYED RELEASE ORAL at 09:55

## 2024-08-30 RX ADMIN — SODIUM CHLORIDE 20 ML: 9 INJECTION, SOLUTION INTRAVENOUS at 05:55

## 2024-08-30 RX ADMIN — TAMSULOSIN HYDROCHLORIDE 0.4 MG: 0.4 CAPSULE ORAL at 21:28

## 2024-08-30 RX ADMIN — PIPERACILLIN AND TAZOBACTAM 3375 MG: 3; .375 INJECTION, POWDER, LYOPHILIZED, FOR SOLUTION INTRAVENOUS at 16:32

## 2024-08-30 RX ADMIN — PIPERACILLIN AND TAZOBACTAM 4500 MG: 4; .5 INJECTION, POWDER, LYOPHILIZED, FOR SOLUTION INTRAVENOUS at 10:09

## 2024-08-30 RX ADMIN — PIPERACILLIN AND TAZOBACTAM 3375 MG: 3; .375 INJECTION, POWDER, LYOPHILIZED, FOR SOLUTION INTRAVENOUS at 22:49

## 2024-08-30 RX ADMIN — TAMSULOSIN HYDROCHLORIDE 0.4 MG: 0.4 CAPSULE ORAL at 09:55

## 2024-08-30 RX ADMIN — ROSUVASTATIN 20 MG: 20 TABLET, FILM COATED ORAL at 09:54

## 2024-08-30 RX ADMIN — POTASSIUM CHLORIDE 40 MEQ: 750 TABLET, EXTENDED RELEASE ORAL at 09:55

## 2024-08-30 ASSESSMENT — PAIN SCALES - GENERAL
PAINLEVEL_OUTOF10: 5
PAINLEVEL_OUTOF10: 0

## 2024-08-30 ASSESSMENT — PAIN DESCRIPTION - ORIENTATION: ORIENTATION: MID

## 2024-08-30 ASSESSMENT — PAIN DESCRIPTION - LOCATION: LOCATION: HEAD

## 2024-08-30 ASSESSMENT — PAIN DESCRIPTION - DESCRIPTORS: DESCRIPTORS: ACHING

## 2024-08-30 ASSESSMENT — PAIN - FUNCTIONAL ASSESSMENT: PAIN_FUNCTIONAL_ASSESSMENT: ACTIVITIES ARE NOT PREVENTED

## 2024-08-31 LAB
ANION GAP SERPL CALC-SCNC: 7 MMOL/L (ref 5–15)
BACTERIA SPEC CULT: ABNORMAL
BASOPHILS # BLD: 0 K/UL (ref 0–0.1)
BASOPHILS NFR BLD: 1 % (ref 0–1)
BUN SERPL-MCNC: 16 MG/DL (ref 6–20)
BUN/CREAT SERPL: 12 (ref 12–20)
CALCIUM SERPL-MCNC: 8.7 MG/DL (ref 8.5–10.1)
CC UR VC: ABNORMAL
CHLORIDE SERPL-SCNC: 105 MMOL/L (ref 97–108)
CO2 SERPL-SCNC: 25 MMOL/L (ref 21–32)
CREAT SERPL-MCNC: 1.35 MG/DL (ref 0.7–1.3)
DIFFERENTIAL METHOD BLD: ABNORMAL
EOSINOPHIL # BLD: 0.1 K/UL (ref 0–0.4)
EOSINOPHIL NFR BLD: 3 % (ref 0–7)
ERYTHROCYTE [DISTWIDTH] IN BLOOD BY AUTOMATED COUNT: 13.3 % (ref 11.5–14.5)
GLUCOSE SERPL-MCNC: 117 MG/DL (ref 65–100)
HCT VFR BLD AUTO: 31.5 % (ref 36.6–50.3)
HGB BLD-MCNC: 10.2 G/DL (ref 12.1–17)
IMM GRANULOCYTES # BLD AUTO: 0 K/UL (ref 0–0.04)
IMM GRANULOCYTES NFR BLD AUTO: 1 % (ref 0–0.5)
LYMPHOCYTES # BLD: 0.6 K/UL (ref 0.8–3.5)
LYMPHOCYTES NFR BLD: 10 % (ref 12–49)
MCH RBC QN AUTO: 27.6 PG (ref 26–34)
MCHC RBC AUTO-ENTMCNC: 32.4 G/DL (ref 30–36.5)
MCV RBC AUTO: 85.1 FL (ref 80–99)
MONOCYTES # BLD: 0.8 K/UL (ref 0–1)
MONOCYTES NFR BLD: 14 % (ref 5–13)
NEUTS SEG # BLD: 3.9 K/UL (ref 1.8–8)
NEUTS SEG NFR BLD: 71 % (ref 32–75)
NRBC # BLD: 0 K/UL (ref 0–0.01)
NRBC BLD-RTO: 0 PER 100 WBC
PLATELET # BLD AUTO: 153 K/UL (ref 150–400)
PMV BLD AUTO: 10.8 FL (ref 8.9–12.9)
POTASSIUM SERPL-SCNC: 3.6 MMOL/L (ref 3.5–5.1)
RBC # BLD AUTO: 3.7 M/UL (ref 4.1–5.7)
SERVICE CMNT-IMP: ABNORMAL
SODIUM SERPL-SCNC: 137 MMOL/L (ref 136–145)
WBC # BLD AUTO: 5.5 K/UL (ref 4.1–11.1)

## 2024-08-31 PROCEDURE — 2060000000 HC ICU INTERMEDIATE R&B

## 2024-08-31 PROCEDURE — 6360000002 HC RX W HCPCS: Performed by: INTERNAL MEDICINE

## 2024-08-31 PROCEDURE — 80048 BASIC METABOLIC PNL TOTAL CA: CPT

## 2024-08-31 PROCEDURE — 85025 COMPLETE CBC W/AUTO DIFF WBC: CPT

## 2024-08-31 PROCEDURE — 36415 COLL VENOUS BLD VENIPUNCTURE: CPT

## 2024-08-31 PROCEDURE — 2580000003 HC RX 258: Performed by: INTERNAL MEDICINE

## 2024-08-31 PROCEDURE — 6370000000 HC RX 637 (ALT 250 FOR IP): Performed by: STUDENT IN AN ORGANIZED HEALTH CARE EDUCATION/TRAINING PROGRAM

## 2024-08-31 PROCEDURE — 6370000000 HC RX 637 (ALT 250 FOR IP): Performed by: INTERNAL MEDICINE

## 2024-08-31 PROCEDURE — 2580000003 HC RX 258: Performed by: STUDENT IN AN ORGANIZED HEALTH CARE EDUCATION/TRAINING PROGRAM

## 2024-08-31 RX ORDER — CARVEDILOL 6.25 MG/1
6.25 TABLET ORAL 2 TIMES DAILY WITH MEALS
Status: DISCONTINUED | OUTPATIENT
Start: 2024-08-31 | End: 2024-09-01 | Stop reason: HOSPADM

## 2024-08-31 RX ADMIN — ROSUVASTATIN 20 MG: 20 TABLET, FILM COATED ORAL at 09:22

## 2024-08-31 RX ADMIN — SODIUM CHLORIDE, PRESERVATIVE FREE 10 ML: 5 INJECTION INTRAVENOUS at 21:16

## 2024-08-31 RX ADMIN — TAMSULOSIN HYDROCHLORIDE 0.4 MG: 0.4 CAPSULE ORAL at 09:22

## 2024-08-31 RX ADMIN — PIPERACILLIN AND TAZOBACTAM 3375 MG: 3; .375 INJECTION, POWDER, LYOPHILIZED, FOR SOLUTION INTRAVENOUS at 06:46

## 2024-08-31 RX ADMIN — SODIUM CHLORIDE, PRESERVATIVE FREE 10 ML: 5 INJECTION INTRAVENOUS at 09:25

## 2024-08-31 RX ADMIN — PANTOPRAZOLE SODIUM 40 MG: 40 TABLET, DELAYED RELEASE ORAL at 09:22

## 2024-08-31 RX ADMIN — CARVEDILOL 6.25 MG: 6.25 TABLET, FILM COATED ORAL at 17:07

## 2024-08-31 RX ADMIN — ACETAMINOPHEN 650 MG: 325 TABLET ORAL at 01:45

## 2024-08-31 RX ADMIN — PIPERACILLIN AND TAZOBACTAM 3375 MG: 3; .375 INJECTION, POWDER, LYOPHILIZED, FOR SOLUTION INTRAVENOUS at 14:36

## 2024-08-31 RX ADMIN — TICAGRELOR 90 MG: 90 TABLET ORAL at 21:16

## 2024-08-31 RX ADMIN — TAMSULOSIN HYDROCHLORIDE 0.4 MG: 0.4 CAPSULE ORAL at 21:16

## 2024-08-31 ASSESSMENT — PAIN SCALES - GENERAL
PAINLEVEL_OUTOF10: 0
PAINLEVEL_OUTOF10: 6

## 2024-08-31 ASSESSMENT — PAIN DESCRIPTION - LOCATION: LOCATION: HEAD

## 2024-08-31 ASSESSMENT — PAIN DESCRIPTION - DESCRIPTORS: DESCRIPTORS: ACHING

## 2024-08-31 ASSESSMENT — PAIN - FUNCTIONAL ASSESSMENT: PAIN_FUNCTIONAL_ASSESSMENT: ACTIVITIES ARE NOT PREVENTED

## 2024-08-31 ASSESSMENT — PAIN DESCRIPTION - ORIENTATION: ORIENTATION: MID

## 2024-08-31 NOTE — CONSULTS
[facility]        Consult    NAME: CAROL Alebrto   :  1950   MRN:  [id number]     Date/Time:  2024 3:40 PM    Patient PCP: No primary care provider on file.  ________________________________________________________________________     Assessment:     Hematuria    CAD with hx of 4 stents, has been on brlinta since 2017  Chronic systolic chf  Sinus  HTN  Dyslipidemia  Prostate CA  , lives in [place], [facility]        Plan:     - No chest pain  - Euvolemic  - Sinus    - Has held brilinta for 5 days, and patient and wife wish to resume, they note he has cardiac events if he holds to long, they will follow up with cardiologist to see if can change to aspirin therapy alone  - Resume coreg, titrate back to usual dose  - Holding diovan, resume as able  - Holding lasix, resume as able  - Cont rosuvastatin    Call with cardiolgy concerns.       [x]       High complexity decision making was performed in this patient at high risk for decompensation with multiple organ involvement.      Subjective:   CHIEF COMPLAINT: Hematuria    HISTORY OF PRESENT ILLNESS:       CRAOL Alberto is a 73 y.o.  male with PMHx as listed below presenting as transfer from [facility] after presenting with complaints of bilateral flank pain and urinary retention found to have gross hematuria with large clot in bladder resulting in bladder outlet obstruction and bilateral hydroureteronephrosis.  Patient reports history of prostate cancer status post radiation and hormonal suppressive therapy now off medications undergoing PSA surveillance.  Reports prior history of gross hematuria requiring cystoscopy complicated by bladder rupture.  ROS otherwise negative.  Denies tobacco, alcohol, illicit drugs.  Takes Brilinta, but no true anticoagulants.     At OSH, patient tachycardic, normotensive, saturating well on room air.  CT abdomen pelvis demonstrating bilateral hydroureteronephrosis to the level of urinary bladder

## 2024-09-01 VITALS
OXYGEN SATURATION: 98 % | SYSTOLIC BLOOD PRESSURE: 133 MMHG | BODY MASS INDEX: 25.8 KG/M2 | TEMPERATURE: 98.3 F | HEIGHT: 71 IN | DIASTOLIC BLOOD PRESSURE: 61 MMHG | WEIGHT: 184.3 LBS | RESPIRATION RATE: 15 BRPM | HEART RATE: 78 BPM

## 2024-09-01 PROBLEM — R31.0 GROSS HEMATURIA: Status: RESOLVED | Noted: 2024-08-29 | Resolved: 2024-09-01

## 2024-09-01 LAB
ANION GAP SERPL CALC-SCNC: 6 MMOL/L (ref 5–15)
BASOPHILS # BLD: 0 K/UL (ref 0–0.1)
BASOPHILS NFR BLD: 1 % (ref 0–1)
BUN SERPL-MCNC: 16 MG/DL (ref 6–20)
BUN/CREAT SERPL: 12 (ref 12–20)
CALCIUM SERPL-MCNC: 9 MG/DL (ref 8.5–10.1)
CHLORIDE SERPL-SCNC: 107 MMOL/L (ref 97–108)
CO2 SERPL-SCNC: 25 MMOL/L (ref 21–32)
CREAT SERPL-MCNC: 1.33 MG/DL (ref 0.7–1.3)
DIFFERENTIAL METHOD BLD: ABNORMAL
EOSINOPHIL # BLD: 0.2 K/UL (ref 0–0.4)
EOSINOPHIL NFR BLD: 4 % (ref 0–7)
ERYTHROCYTE [DISTWIDTH] IN BLOOD BY AUTOMATED COUNT: 13.1 % (ref 11.5–14.5)
GLUCOSE SERPL-MCNC: 108 MG/DL (ref 65–100)
HCT VFR BLD AUTO: 30.5 % (ref 36.6–50.3)
HGB BLD-MCNC: 9.9 G/DL (ref 12.1–17)
IMM GRANULOCYTES # BLD AUTO: 0 K/UL (ref 0–0.04)
IMM GRANULOCYTES NFR BLD AUTO: 1 % (ref 0–0.5)
LYMPHOCYTES # BLD: 0.9 K/UL (ref 0.8–3.5)
LYMPHOCYTES NFR BLD: 18 % (ref 12–49)
MCH RBC QN AUTO: 27.3 PG (ref 26–34)
MCHC RBC AUTO-ENTMCNC: 32.5 G/DL (ref 30–36.5)
MCV RBC AUTO: 84 FL (ref 80–99)
MONOCYTES # BLD: 0.6 K/UL (ref 0–1)
MONOCYTES NFR BLD: 12 % (ref 5–13)
NEUTS SEG # BLD: 3.2 K/UL (ref 1.8–8)
NEUTS SEG NFR BLD: 64 % (ref 32–75)
NRBC # BLD: 0 K/UL (ref 0–0.01)
NRBC BLD-RTO: 0 PER 100 WBC
PLATELET # BLD AUTO: 192 K/UL (ref 150–400)
PMV BLD AUTO: 10.9 FL (ref 8.9–12.9)
POTASSIUM SERPL-SCNC: 3.6 MMOL/L (ref 3.5–5.1)
RBC # BLD AUTO: 3.63 M/UL (ref 4.1–5.7)
SODIUM SERPL-SCNC: 138 MMOL/L (ref 136–145)
WBC # BLD AUTO: 5 K/UL (ref 4.1–11.1)

## 2024-09-01 PROCEDURE — 6360000002 HC RX W HCPCS: Performed by: INTERNAL MEDICINE

## 2024-09-01 PROCEDURE — 6370000000 HC RX 637 (ALT 250 FOR IP): Performed by: INTERNAL MEDICINE

## 2024-09-01 PROCEDURE — 36415 COLL VENOUS BLD VENIPUNCTURE: CPT

## 2024-09-01 PROCEDURE — 2580000003 HC RX 258: Performed by: INTERNAL MEDICINE

## 2024-09-01 PROCEDURE — 6370000000 HC RX 637 (ALT 250 FOR IP): Performed by: STUDENT IN AN ORGANIZED HEALTH CARE EDUCATION/TRAINING PROGRAM

## 2024-09-01 PROCEDURE — 80048 BASIC METABOLIC PNL TOTAL CA: CPT

## 2024-09-01 PROCEDURE — 85025 COMPLETE CBC W/AUTO DIFF WBC: CPT

## 2024-09-01 RX ORDER — CARVEDILOL 25 MG/1
25 TABLET ORAL 2 TIMES DAILY WITH MEALS
Qty: 30 TABLET | Refills: 0 | Status: SHIPPED | OUTPATIENT
Start: 2024-09-01 | End: 2024-10-01

## 2024-09-01 RX ORDER — CARVEDILOL 6.25 MG/1
6.25 TABLET ORAL 2 TIMES DAILY WITH MEALS
Qty: 60 TABLET | Refills: 3 | Status: SHIPPED | OUTPATIENT
Start: 2024-09-01 | End: 2024-09-01

## 2024-09-01 RX ORDER — CEFDINIR 300 MG/1
300 CAPSULE ORAL 2 TIMES DAILY
Qty: 10 CAPSULE | Refills: 0 | Status: SHIPPED | OUTPATIENT
Start: 2024-09-01 | End: 2024-09-09

## 2024-09-01 RX ADMIN — TAMSULOSIN HYDROCHLORIDE 0.4 MG: 0.4 CAPSULE ORAL at 09:48

## 2024-09-01 RX ADMIN — PIPERACILLIN AND TAZOBACTAM 3375 MG: 3; .375 INJECTION, POWDER, LYOPHILIZED, FOR SOLUTION INTRAVENOUS at 09:51

## 2024-09-01 RX ADMIN — CARVEDILOL 6.25 MG: 6.25 TABLET, FILM COATED ORAL at 09:48

## 2024-09-01 RX ADMIN — PANTOPRAZOLE SODIUM 40 MG: 40 TABLET, DELAYED RELEASE ORAL at 09:48

## 2024-09-01 RX ADMIN — PIPERACILLIN AND TAZOBACTAM 3375 MG: 3; .375 INJECTION, POWDER, LYOPHILIZED, FOR SOLUTION INTRAVENOUS at 00:22

## 2024-09-01 RX ADMIN — TICAGRELOR 90 MG: 90 TABLET ORAL at 09:48

## 2024-09-01 RX ADMIN — ROSUVASTATIN 20 MG: 20 TABLET, FILM COATED ORAL at 09:48

## 2024-09-01 ASSESSMENT — PAIN SCALES - GENERAL
PAINLEVEL_OUTOF10: 0

## 2024-09-01 NOTE — DISCHARGE INSTRUCTIONS
HOSPITALIST DISCHARGE INSTRUCTIONS    NAME: CAROL Alberto   :  1950   MRN:  [id number]     Date/Time:  2024 2:38 PM    ADMIT DATE: 2024     DISCHARGE DATE: 2024     DISCHARGE DIAGNOSIS:  Gross hematuria with bladder outlet obstruction  Bilateral hydronephrosis  History of prostate cancer  Complicated urinary tract infection-  Quinolones resistant E Coli   History of coronary artery disease status post drug-eluting stent  Hypertension, currently borderline low  Dyslipidemia  GERD  Hypokalemia- resolved       MEDICATIONS:  As per medication reconciliation  list  It is important that you take the medication exactly as they are prescribed.   Keep your medication in the bottles provided by the pharmacist and keep a list of the medication names, dosages, and times to be taken in your wallet.   Do not take other medications without consulting your doctor.     Pain Management: per above medications    What to do at Home:  Lasix and Valsartan was hold due to borderline BP   Continue with Cefdinir for E Coli UTI   Follow up with Urology clinic for prostate cancer   Plavix was hold for 5 days and resumed due to hematuria   Follow up with cardio clinic     Recommended diet:  cardiac diet    Recommended activity: activity as tolerated    If you have questions regarding the hospital related prescriptions or hospital related issues please call at .    If you experience any of the following symptoms then please call your primary care physician or return to the emergency room if you cannot get hold of your doctor:  Fever, chills, nausea, vomiting, diarrhea, change in mentation, falling, bleeding, shortness of breath    Follow Up:   @PCP@  you are to call and set up an appointment to see them in 7-10 days.      Information obtained by :  I understand that if any problems occur once I am at home I am to contact my physician.    I understand and acknowledge receipt of the instructions indicated

## 2024-09-01 NOTE — DISCHARGE SUMMARY
Discharge Summary    Name: CAROL Alberto  [id number]  YOB: 1950 (Age: 73 y.o.)   Date of Admission: 8/29/2024  Date of Discharge: 9/1/2024  Attending Physician: Caterina Harper MD    Discharge Diagnosis:   Gross hematuria with bladder outlet obstruction  Bilateral hydronephrosis  History of prostate cancer  Complicated urinary tract infection-  Quinolones resistant E Coli   History of coronary artery disease status post drug-eluting stent  Hypertension, currently borderline low  Dyslipidemia  GERD  Hypokalemia- resolved             Consultations:  IP CONSULT TO UROLOGY  IP CONSULT TO CARDIOLOGY      Brief Admission History/Reason for Admission Per Kamar Cervantes, DO:   CAROL Alberto is a 73 y.o.  male with PMHx as listed below presenting as transfer from [facility] after presenting with complaints of bilateral flank pain and urinary retention found to have gross hematuria with large clot in bladder resulting in bladder outlet obstruction and bilateral hydroureteronephrosis.  Patient reports history of prostate cancer status post radiation and hormonal suppressive therapy now off medications undergoing PSA surveillance.  Reports prior history of gross hematuria requiring cystoscopy complicated by bladder rupture.  ROS otherwise negative.  Denies tobacco, alcohol, illicit drugs.  Takes Brilinta, but no true anticoagulants.     At OSH, patient tachycardic, normotensive, saturating well on room air.  CT abdomen pelvis demonstrating bilateral hydroureteronephrosis to the level of urinary bladder where there is high density mass representing hemorrhage/clot/mass with concern for enlarged prostate noted.  Labs demonstrated: Unremarkable LFTs, sodium 133, potassium 3.9, glucose 131, BUN 19, creatinine 1.45, WBC 14.9, hemoglobin 12.6, platelets 195.  Three-way catheter placed and patient started on continuous bladder irrigation.       Brief Hospital Course by

## 2024-09-01 NOTE — PLAN OF CARE
Problem: Discharge Planning  Goal: Discharge to home or other facility with appropriate resources  8/29/2024 1758 by Karin Mojica RN  Outcome: Progressing     Problem: Skin/Tissue Integrity  Goal: Absence of new skin breakdown  Description: 1.  Monitor for areas of redness and/or skin breakdown  2.  Assess vascular access sites hourly  3.  Every 4-6 hours minimum:  Change oxygen saturation probe site  4.  Every 4-6 hours:  If on nasal continuous positive airway pressure, respiratory therapy assess nares and determine need for appliance change or resting period.  8/30/2024 0241 by Mima Woodall RN  Outcome: Progressing  8/29/2024 1758 by Karin Mojica RN  Outcome: Progressing     Problem: ABCDS Injury Assessment  Goal: Absence of physical injury  8/29/2024 1758 by Karin Mojica RN  Outcome: Progressing     Problem: Pain  Goal: Verbalizes/displays adequate comfort level or baseline comfort level  8/30/2024 0241 by Mima Woodall RN  Outcome: Progressing  8/29/2024 1758 by Karin Mojica RN  Outcome: Progressing     Problem: Safety - Adult  Goal: Free from fall injury  8/30/2024 0241 by Mima Woodall RN  Outcome: Progressing  8/29/2024 1758 by Karin Mojica RN  Outcome: Progressing     
  Problem: Discharge Planning  Goal: Discharge to home or other facility with appropriate resources  8/29/2024 1758 by Karin Mojica RN  Outcome: Progressing     Problem: Skin/Tissue Integrity  Goal: Absence of new skin breakdown  Description: 1.  Monitor for areas of redness and/or skin breakdown  2.  Assess vascular access sites hourly  3.  Every 4-6 hours minimum:  Change oxygen saturation probe site  4.  Every 4-6 hours:  If on nasal continuous positive airway pressure, respiratory therapy assess nares and determine need for appliance change or resting period.  8/30/2024 0241 by Mima Woodall RN  Outcome: Progressing  8/29/2024 1758 by Karin Mojica RN  Outcome: Progressing     Problem: ABCDS Injury Assessment  Goal: Absence of physical injury  8/29/2024 1758 by Karin Mojica RN  Outcome: Progressing     Problem: Pain  Goal: Verbalizes/displays adequate comfort level or baseline comfort level  8/30/2024 0241 by Mima Woodall RN  Outcome: Progressing  8/29/2024 1758 by Karin Mojica RN  Outcome: Progressing     Problem: Safety - Adult  Goal: Free from fall injury  8/30/2024 0241 by Mima Woodall RN  Outcome: Progressing  8/29/2024 1758 by Karin Mojica RN  Outcome: Progressing     Problem: Cardiovascular - Adult  Goal: Maintains optimal cardiac output and hemodynamic stability  Outcome: Progressing     Problem: Genitourinary - Adult  Goal: Urinary catheter remains patent  Outcome: Progressing     Problem: Infection - Adult  Goal: Absence of infection at discharge  Outcome: Progressing  Goal: Absence of infection during hospitalization  Outcome: Progressing     Problem: Metabolic/Fluid and Electrolytes - Adult  Goal: Electrolytes maintained within normal limits  Outcome: Progressing     
  Problem: Discharge Planning  Goal: Discharge to home or other facility with appropriate resources  8/30/2024 1835 by Savita Martinez RN  Outcome: Progressing     Problem: Skin/Tissue Integrity  Goal: Absence of new skin breakdown  Description: 1.  Monitor for areas of redness and/or skin breakdown  2.  Assess vascular access sites hourly  3.  Every 4-6 hours minimum:  Change oxygen saturation probe site  4.  Every 4-6 hours:  If on nasal continuous positive airway pressure, respiratory therapy assess nares and determine need for appliance change or resting period.  8/31/2024 0046 by Mima Woodall RN  Outcome: Progressing  8/30/2024 1835 by Savita Martinez RN  Outcome: Progressing     Problem: ABCDS Injury Assessment  Goal: Absence of physical injury  8/31/2024 0046 by Mima Woodall RN  Outcome: Progressing  8/30/2024 1835 by Savita Martinez RN  Outcome: Progressing     Problem: Pain  Goal: Verbalizes/displays adequate comfort level or baseline comfort level  8/31/2024 0046 by Mima Woodall RN  Outcome: Progressing  8/30/2024 1835 by Savita Martinez RN  Outcome: Progressing     Problem: Safety - Adult  Goal: Free from fall injury  8/31/2024 0046 by Mima Woodall RN  Outcome: Progressing  8/30/2024 1835 by Savita Martinez RN  Outcome: Progressing     Problem: Cardiovascular - Adult  Goal: Maintains optimal cardiac output and hemodynamic stability  8/31/2024 0046 by Mima Woodall RN  Outcome: Progressing  8/30/2024 1835 by Savita Martinez RN  Outcome: Progressing     Problem: Genitourinary - Adult  Goal: Urinary catheter remains patent  8/31/2024 0046 by Mima Woodall RN  Outcome: Progressing  8/30/2024 1835 by Savita Martinez RN  Outcome: Progressing     Problem: Infection - Adult  Goal: Absence of infection at discharge  8/31/2024 0046 by Mima Woodall RN  Outcome: Progressing  Flowsheets (Taken 8/30/2024 1920)  Absence of infection at discharge:   Assess and monitor for signs and symptoms of infection   Monitor 
  Problem: Discharge Planning  Goal: Discharge to home or other facility with appropriate resources  9/1/2024 1456 by Ania Terrell RN  Outcome: Completed  9/1/2024 0829 by Ania Terrell RN  Outcome: Progressing  Flowsheets  Taken 9/1/2024 0745 by Ania Terrell RN  Discharge to home or other facility with appropriate resources:   Identify barriers to discharge with patient and caregiver   Arrange for needed discharge resources and transportation as appropriate   Identify discharge learning needs (meds, wound care, etc)   Arrange for interpreters to assist at discharge as needed   Refer to discharge planning if patient needs post-hospital services based on physician order or complex needs related to functional status, cognitive ability or social support system  Taken 8/31/2024 1905 by Mima Woodall RN  Discharge to home or other facility with appropriate resources: Identify barriers to discharge with patient and caregiver     Problem: Skin/Tissue Integrity  Goal: Absence of new skin breakdown  Description: 1.  Monitor for areas of redness and/or skin breakdown  2.  Assess vascular access sites hourly  3.  Every 4-6 hours minimum:  Change oxygen saturation probe site  4.  Every 4-6 hours:  If on nasal continuous positive airway pressure, respiratory therapy assess nares and determine need for appliance change or resting period.  9/1/2024 1456 by Ania Terrell RN  Outcome: Completed  9/1/2024 0829 by Ania Terrell RN  Outcome: Progressing     Problem: ABCDS Injury Assessment  Goal: Absence of physical injury  9/1/2024 1456 by Ania Terrell RN  Outcome: Completed  9/1/2024 0829 by Ania Terrell RN  Outcome: Progressing     Problem: Pain  Goal: Verbalizes/displays adequate comfort level or baseline comfort level  9/1/2024 1456 by Ania Terrell RN  Outcome: Completed  9/1/2024 0829 by Ania Terrell RN  Outcome: Progressing  Flowsheets (Taken 9/1/2024 0730)  Verbalizes/displays adequate comfort level or baseline 
  Problem: Discharge Planning  Goal: Discharge to home or other facility with appropriate resources  Outcome: Progressing     Problem: Skin/Tissue Integrity  Goal: Absence of new skin breakdown  Description: 1.  Monitor for areas of redness and/or skin breakdown  2.  Assess vascular access sites hourly  3.  Every 4-6 hours minimum:  Change oxygen saturation probe site  4.  Every 4-6 hours:  If on nasal continuous positive airway pressure, respiratory therapy assess nares and determine need for appliance change or resting period.  8/31/2024 1308 by Ania Terrell RN  Outcome: Progressing  8/31/2024 0046 by Mima Woodall RN  Outcome: Progressing     Problem: ABCDS Injury Assessment  Goal: Absence of physical injury  8/31/2024 1308 by Ania Terrell RN  Outcome: Progressing  8/31/2024 0046 by Mima Woodall RN  Outcome: Progressing     Problem: Pain  Goal: Verbalizes/displays adequate comfort level or baseline comfort level  8/31/2024 1308 by Ania Terrell RN  Outcome: Progressing  8/31/2024 0046 by Mima Woodall RN  Outcome: Progressing     Problem: Safety - Adult  Goal: Free from fall injury  8/31/2024 1308 by Ania Terrell RN  Outcome: Progressing  8/31/2024 0046 by Mima Woodall RN  Outcome: Progressing     Problem: Cardiovascular - Adult  Goal: Maintains optimal cardiac output and hemodynamic stability  8/31/2024 1308 by Ania Terrell RN  Outcome: Progressing  8/31/2024 0046 by Mima Woodall RN  Outcome: Progressing     Problem: Genitourinary - Adult  Goal: Urinary catheter remains patent  8/31/2024 1308 by Ania Terrell RN  Outcome: Progressing  8/31/2024 0046 by Mima Woodall RN  Outcome: Progressing     Problem: Infection - Adult  Goal: Absence of infection at discharge  8/31/2024 1308 by Ania Terrell RN  Outcome: Progressing  8/31/2024 0046 by Mima Woodall RN  Outcome: Progressing  Flowsheets (Taken 8/30/2024 1920)  Absence of infection at discharge:   Assess and monitor for signs and symptoms of 
  Problem: Discharge Planning  Goal: Discharge to home or other facility with appropriate resources  Outcome: Progressing     Problem: Skin/Tissue Integrity  Goal: Absence of new skin breakdown  Description: 1.  Monitor for areas of redness and/or skin breakdown  2.  Assess vascular access sites hourly  3.  Every 4-6 hours minimum:  Change oxygen saturation probe site  4.  Every 4-6 hours:  If on nasal continuous positive airway pressure, respiratory therapy assess nares and determine need for appliance change or resting period.  Outcome: Progressing     Problem: ABCDS Injury Assessment  Goal: Absence of physical injury  Outcome: Progressing     
  Problem: Discharge Planning  Goal: Discharge to home or other facility with appropriate resources  Outcome: Progressing     Problem: Skin/Tissue Integrity  Goal: Absence of new skin breakdown  Description: 1.  Monitor for areas of redness and/or skin breakdown  2.  Assess vascular access sites hourly  3.  Every 4-6 hours minimum:  Change oxygen saturation probe site  4.  Every 4-6 hours:  If on nasal continuous positive airway pressure, respiratory therapy assess nares and determine need for appliance change or resting period.  Outcome: Progressing     Problem: ABCDS Injury Assessment  Goal: Absence of physical injury  Outcome: Progressing     Problem: Pain  Goal: Verbalizes/displays adequate comfort level or baseline comfort level  Outcome: Progressing     Problem: Safety - Adult  Goal: Free from fall injury  Outcome: Progressing     Problem: Cardiovascular - Adult  Goal: Maintains optimal cardiac output and hemodynamic stability  Outcome: Progressing     Problem: Genitourinary - Adult  Goal: Urinary catheter remains patent  Outcome: Progressing     Problem: Infection - Adult  Goal: Absence of infection at discharge  Outcome: Progressing  Goal: Absence of infection during hospitalization  Outcome: Progressing     Problem: Metabolic/Fluid and Electrolytes - Adult  Goal: Electrolytes maintained within normal limits  Outcome: Progressing     
  Problem: Discharge Planning  Goal: Discharge to home or other facility with appropriate resources  Outcome: Progressing  Flowsheets (Taken 8/31/2024 1905 by Mima Woodall RN)  Discharge to home or other facility with appropriate resources: Identify barriers to discharge with patient and caregiver     Problem: Skin/Tissue Integrity  Goal: Absence of new skin breakdown  Description: 1.  Monitor for areas of redness and/or skin breakdown  2.  Assess vascular access sites hourly  3.  Every 4-6 hours minimum:  Change oxygen saturation probe site  4.  Every 4-6 hours:  If on nasal continuous positive airway pressure, respiratory therapy assess nares and determine need for appliance change or resting period.  9/1/2024 0829 by Ania Terrell RN  Outcome: Progressing  9/1/2024 0046 by Mima Woodall RN  Outcome: Progressing     Problem: ABCDS Injury Assessment  Goal: Absence of physical injury  9/1/2024 0829 by Ania Terrell RN  Outcome: Progressing  9/1/2024 0046 by Mima Woodall RN  Outcome: Progressing     Problem: Pain  Goal: Verbalizes/displays adequate comfort level or baseline comfort level  9/1/2024 0829 by Ania Terrell RN  Outcome: Progressing  9/1/2024 0046 by Mima Woodall RN  Outcome: Progressing     Problem: Safety - Adult  Goal: Free from fall injury  9/1/2024 0829 by Ania Terrell RN  Outcome: Progressing  9/1/2024 0046 by Mima Woodall RN  Outcome: Progressing     Problem: Cardiovascular - Adult  Goal: Maintains optimal cardiac output and hemodynamic stability  9/1/2024 0829 by Ania Terrell RN  Outcome: Progressing  9/1/2024 0046 by Mima Woodall RN  Outcome: Progressing  Flowsheets (Taken 8/31/2024 1905)  Maintains optimal cardiac output and hemodynamic stability: Monitor blood pressure and heart rate     Problem: Genitourinary - Adult  Goal: Urinary catheter remains patent  9/1/2024 0829 by Ania Terrell RN  Outcome: Progressing  9/1/2024 0046 by Mima Woodall RN  Outcome: Progressing  Flowsheets 
  Problem: Discharge Planning  Goal: Discharge to home or other facility with appropriate resources  Recent Flowsheet Documentation  Taken 8/31/2024 1905 by Mima Woodall RN  Discharge to home or other facility with appropriate resources: Identify barriers to discharge with patient and caregiver  8/31/2024 1308 by Ania Terrell RN  Outcome: Progressing  Flowsheets (Taken 8/31/2024 0799)  Discharge to home or other facility with appropriate resources:   Identify barriers to discharge with patient and caregiver   Arrange for needed discharge resources and transportation as appropriate   Identify discharge learning needs (meds, wound care, etc)   Refer to discharge planning if patient needs post-hospital services based on physician order or complex needs related to functional status, cognitive ability or social support system     Problem: Skin/Tissue Integrity  Goal: Absence of new skin breakdown  Description: 1.  Monitor for areas of redness and/or skin breakdown  2.  Assess vascular access sites hourly  3.  Every 4-6 hours minimum:  Change oxygen saturation probe site  4.  Every 4-6 hours:  If on nasal continuous positive airway pressure, respiratory therapy assess nares and determine need for appliance change or resting period.  9/1/2024 0046 by Mima Woodall RN  Outcome: Progressing  8/31/2024 1308 by Ania Terrell RN  Outcome: Progressing     Problem: ABCDS Injury Assessment  Goal: Absence of physical injury  9/1/2024 0046 by Mima Woodall RN  Outcome: Progressing  8/31/2024 1308 by Ania Terrell RN  Outcome: Progressing     Problem: Pain  Goal: Verbalizes/displays adequate comfort level or baseline comfort level  9/1/2024 0046 by Mima Woodall RN  Outcome: Progressing  8/31/2024 1308 by Ania Terrell RN  Outcome: Progressing  Flowsheets (Taken 8/31/2024 0859)  Verbalizes/displays adequate comfort level or baseline comfort level:   Encourage patient to monitor pain and request assistance   Assess pain using 
urinary catheter   Irrigate catheter per Licensed Independent Practitioner order if indicated and notify Licensed Independent Practitioner if unable to irrigate     Problem: Infection - Adult  Goal: Absence of infection at discharge  8/30/2024 0422 by Mima Woodall RN  Outcome: Progressing  8/30/2024 0241 by Mima Woodall RN  Outcome: Progressing  Flowsheets (Taken 8/29/2024 1915)  Absence of infection at discharge:   Assess and monitor for signs and symptoms of infection   Monitor lab/diagnostic results   Monitor all insertion sites i.e., indwelling lines, tubes and drains  Goal: Absence of infection during hospitalization  8/30/2024 0422 by Mima Woodall RN  Outcome: Progressing  8/30/2024 0241 by Mima Woodall RN  Outcome: Progressing  Flowsheets (Taken 8/29/2024 1915)  Absence of infection during hospitalization:   Assess and monitor for signs and symptoms of infection   Monitor lab/diagnostic results     Problem: Metabolic/Fluid and Electrolytes - Adult  Goal: Electrolytes maintained within normal limits  8/30/2024 0422 by Mima Woodall RN  Outcome: Progressing  8/30/2024 0241 by Mima Woodall RN  Outcome: Progressing  Flowsheets (Taken 8/29/2024 1915)  Electrolytes maintained within normal limits: Monitor labs and assess patient for signs and symptoms of electrolyte imbalances

## 2024-09-01 NOTE — CARE COORDINATION
Chart reviewed. Order seen in chart. 2nd IM given.    CM met with patient and spouse. Has transportation home. Denies any dc needs requiring CM assistance.     Patient my dc from CM standpoint.       09/01/24 1527   Services At/After Discharge   Transition of Care Consult (CM Consult) Discharge Planning   Services At/After Discharge None    Resource Information Provided? No   Mode of Transport at Discharge Other (see comment)  (spouse)   Confirm Follow Up Transport Family   Condition of Participation: Discharge Planning   The Plan for Transition of Care is related to the following treatment goals: Patient will return home with spouse. Will follow up with physicians, when he returns to [place].   The Patient and/or Patient Representative was provided with a Choice of Provider?   (na)   Freedom of Choice list was provided with basic dialogue that supports the patient's individualized plan of care/goals, treatment preferences, and shares the quality data associated with the providers?    (na)     Ysabel Bullock RN  Care Manager  
  Today we documented Decision Maker(s) consistent with Legal Next of Kin hierarchy.  Content/Action Overview:  DECLINED ACP Conversation - will revisit periodically  Reviewed DNR/DNI and patient elects Full Code (Attempt Resuscitation)    Length of Voluntary ACP Conversation in minutes:  <16 minutes (Non-Billable)    Liliana Chavarria LMSW  Supervisee in Social Work  Care Management, [facility]  [id number]

## 2024-09-01 NOTE — PROGRESS NOTES
Progress Note    Patient: CAROL Alberto MRN: [id number]  SSN: [id number]    YOB: 1950  Age: 73 y.o.  Sex: male          ADMITTED:  2024 to Pablo Moran MD  for Gross hematuria [R31.0]           CAROL Alberto was admitted for Gross hematuria [R31.0].    Urology follow up for GH on CBI. Brilinta has been held >48 hrs now. Urine cx  prelim GNR.   Hgb 11.9->10.3 however urine remained clear overnight on minimal CBI.  No issues with clot retention overnight or suprapubic pain   24hr tmax 100.6 with other VSS   Creat 1.35->1.54  Weaning cbi   Issues with hypotension yesterday, now recovered     Vitals:  Temp (24hrs), Av °F (37.2 °C), Min:98 °F (36.7 °C), Max:100.6 °F (38.1 °C)     Blood pressure (!) 116/40, pulse 90, temperature 98.8 °F (37.1 °C), temperature source Oral, resp. rate 20, height 1.803 m (5' 10.98\"), weight 86.2 kg (190 lb), SpO2 98%.      I&O's:   1901 -  0700  In: 1003.3 [P.O.:550; I.V.:10]  Out: -440    No intake/output data recorded.     Exam:   NAD. abdomen soft, NT     Labs:   Recent Labs     24  1619 24  0437 24  0531   WBC 14.9* 11.4* 8.8   HGB 12.6 11.9* 10.3*   HCT 39.1 36.6 31.5*    189 156     Recent Labs     24  1619 24  0437 24  0531   * 136 135*   K 3.9 3.6 3.3*    102 102   CO2 22 27 25   BUN 19 20 23*        Cultures:        Imaging:       Assessment:     - Principal Problem:    Gross hematuria  Resolved Problems:    * No resolved hospital problems. *      Plan:     - hematuria mostly resolved overnight, CBI almost off. Okay to eat today.  -suspect hgb drop dilutional or delay from clots yesterday as his hematuria greatly improved now  - If able to titrate off cbi today and keep off overnight, can attempt 6am voiding trial in AM and trial of resuming brilinta tomorrow, pending cardiology review  -continue empiric abx  -nursing aware of PRN manual flush of burger 
      Hospitalist Progress Note    NAME:   CAROL Alberto   : 1950   MRN: [id number]     Date/Time: 2024 9:52 PM  Patient PCP: No primary care provider on file.    Estimated discharge date:   Barriers: Allergy clearance      Assessment / Plan:    Gross hematuria with bladder outlet obstruction  Bilateral hydronephrosis  History of prostate cancer  Complicated urinary tract infection  -Urology following.  On continuous bladder irrigation.  -Due to fever in spite of antibiotics including ceftriaxone, will change antibiotic to Zosyn.  Follow urine culture results.  -Closely monitor blood pressure.  Blood pressure borderline low  -Continue Flomax    History of coronary artery disease status post drug-eluting stent  Hypertension, currently borderline low  Dyslipidemia  GERD  -Last stent was in .  Holding Brilinta.  Cardiology consulted  -Hold all home antihypertensives due to borderline low blood pressure  -Continue Crestor and Protonix  -Resume Lasix once appropriate    Hypokalemia  -KCl replaced will recheck in a.m. tomorrow    ?  On Lasix at home   -Question of congestive heart failure.  proBNP is 900.  Check 2D echocardiogram.  -He is currently on room air  -Currently blood pressure is okay but if he drops, consider IV fluids with caution        Medical Decision Making:   I personally reviewed labs: CBC, BMP  I personally reviewed imaging: CT abdomen  I personally reviewed EKG: Telemetry monitoring  Toxic drug monitoring:   Discussed case with: Urology        Code Status: Full code  DVT Prophylaxis: SCDs  GI Prophylaxis:    Subjective:     Chief Complaint / Reason for Physician Visit  Had 1 episode of fever last night.  Reports some lower abdominal discomfort.  Urine clearing up and is light pinkish  No fever  Overnight events noted      Objective:     VITALS:   Last 24hrs VS reviewed since prior progress note. Most recent are:  Patient Vitals for the past 24 hrs:   BP Temp Temp src Pulse Resp SpO2 
  Pharmacy Note - Extended Infusion Beta-Lactam Adjustment    Piperacillin/Tazobactam  2250 mg IV q12h  for treatment of Urinary tract infection. Per [facility] Extended Infusion Beta-Lactam Policy, piperacillin/tazobactam will be changed to 4500mg loading dose followed by 3375mg Q8h extended infusion    Estimated Creatinine Clearance: Estimated Creatinine Clearance: 46 mL/min (A) (based on SCr of 1.54 mg/dL (H)).    BMI: Body mass index is 26.51 kg/m².    Please call with any questions.    Thank you,    ARTUR MEDINA, [facility]    
Attempted to reach out to [facility] in [place] to obtain medical records regarding patients history of stent placement and prescription for brilinta.  Spoke with nursing supervisor at [facility] and was advised to call back during business hours Monday-Friday.   
Cardiology consult placed at 1215 PM on 8/29  Consult received at 1915 on 8/30.  Will be seen tomorrow.    In the interim please obtain records from the outside Cardiologist as to why the pt is on DAPT.    I spoke with the nurse twice and advised her to get outside MD/DO names.numbers and to have the floor work on getting records.  
End of Shift Note    Bedside shift change report given to  (oncoming nurse) by Mima Woodall RN (offgoing nurse).  Report included the following information SBAR    Shift worked:  0500 Urine continues to be mikael color no visible signs of bleeding .       Shift summary and any significant changes:          Concerns for physician to address:       Zone phone for oncoming shift:          Activity:     Number times ambulated in hallways past shift: 0  Number of times OOB to chair past shift: 0    Cardiac:   Cardiac Monitoring: Yes           Access:  Current line(s): PIV     Genitourinary:   Urinary status: voiding    Respiratory:      Chronic home O2 use?: NO  Incentive spirometer at bedside: YES       GI:     Current diet:  ADULT DIET; Regular; 4 carb choices (60 gm/meal); Low Fat/Low Chol/High Fiber/JONATHAN  Passing flatus: YES  Tolerating current diet: YES       Pain Management:   Patient states pain is manageable on current regimen: YES    Skin:     Interventions: increase time out of bed    Patient Safety:  Fall Score:    Interventions: bed/chair alarm, assistive device (walker, cane. etc), gripper socks, pt to call before getting OOB, and stay with me (per policy)       Length of Stay:  Expected LOS: 3  Actual LOS: 3      Mima Woodall, RN                            
End of Shift Note    Bedside shift change report given to  (oncoming nurse) by Mima Woodall RN (offgoing nurse).  Report included the following information SBAR    Shift worked:  CBI in progress urine clearing up ,slight pink tinged      Shift summary and any significant changes:     Per bedside report PT NPO after 0000 for cardio consult      Concerns for physician to address:  0010 Temp 100.6 oral   I repeated  0015 -99.2   Repeated again 0030 -99.0  0300 98.8   Messaged NP no new orders    Zone phone for oncoming shift:          Activity:     Number times ambulated in hallways past shift: 0  Number of times OOB to chair past shift: 0    Cardiac:   Cardiac Monitoring: Yes           Access:  Current line(s): PIV     Genitourinary:   Urinary status: burger    Respiratory:      Chronic home O2 use?: NO  Incentive spirometer at bedside: NO       GI:     Current diet:  ADULT DIET; Regular; 4 carb choices (60 gm/meal); Low Fat/Low Chol/High Fiber/JONATHAN  Passing flatus: YES  Tolerating current diet: YES       Pain Management:   Patient states pain is manageable on current regimen: YES    Skin:     Interventions: increase time out of bed    Patient Safety:  Fall Score:    Interventions: bed/chair alarm, assistive device (walker, cane. etc), gripper socks, pt to call before getting OOB, and stay with me (per policy)       Length of Stay:  Expected LOS: 2  Actual LOS: 1      Mima Woodall RN                            
End of Shift Note    Bedside shift change report given to  (oncoming nurse) by Mima Woodall RN (offgoing nurse).  Report included the following information SBAR    Shift worked:  Cardiac consult called today by day RN spoke with Dr Tsai regarding resuming anticoagulation ,he will see pt in am I placed SCDs on Pt   As per bedside report CBI clamped as per urology  because urine has cleared up   Burger patent draining mikael urine few small flecks of red ,burger care done ,Pt feels ok no pain .     Shift summary and any significant changes:          Concerns for physician to address:       Zone phone for oncoming shift:          Activity:     Number times ambulated in hallways past shift: 0  Number of times OOB to chair past shift: 0    Cardiac:   Cardiac Monitoring: Yes           Access:  Current line(s): PIV     Genitourinary:   Urinary status: burger    Respiratory:      Chronic home O2 use?: NO  Incentive spirometer at bedside: NO       GI:     Current diet:  ADULT DIET; Regular; 4 carb choices (60 gm/meal); Low Fat/Low Chol/High Fiber/JONATHAN  Passing flatus: YES  Tolerating current diet: YES       Pain Management:   Patient states pain is manageable on current regimen: YES    Skin:     Interventions: specialty bed, float heels, increase time out of bed, internal/external urinary devices, and nutritional support    Patient Safety:  Fall Score:    Interventions: bed/chair alarm, assistive device (walker, cane. etc), gripper socks, pt to call before getting OOB, and stay with me (per policy)       Length of Stay:  Expected LOS: 2  Actual LOS: 2      Mima Woodall RN                            
End of Shift Note    Bedside shift change report given to  (oncoming nurse) by Savita Martinez RN (offgoing nurse).  Report included the following information SBAR, Kardex, Intake/Output, and MAR    Shift worked:  7a-7p       Shift summary and any significant changes:     CBI was clamped at shift change per urology in response to clear urine in CBI collection bag.      Concerns for physician to address:       Zone phone for oncoming shift:                Savita Martinez, RN                           
Gross hematuria with bladder outlet obstruction  Bilateral hydronephrosis  History of prostate cancer  Borderline hypotension    Urology following.  Continue bladder irrigation  Holding Lasix and also Coreg due to borderline hypotension  Patient does have a history of congestive heart failure but we do not have any records.  Check proBNP.  Check 2D echocardiogram.  Hold Brilinta.  Cardiology consulted.  Hemoglobin stable at 11.9    Nonbillable note      
PERFECT SERVE MESSAGE:  TO: Dr.. Luisa CHANDLER:   Patients blood pressures are runing soft ()see vitals flowsheet), hes asymptomatic. He's still bleeding and passing clots through Riojas w/ CBI. Urology hasn't seen him yet today. I confirmed consult  was called this morning.       ZAID Mojica RN  
Pt  got up to go to the bathroom and passed 2 moderate clots around the burger. Burger was manually irrigated and  3 or 4 tiny clots were removed.   
08/31/2024 05:40 AM    BUN 16 08/31/2024 05:40 AM     Assessment/Plan:   Gross hematuria  - No urgent urological surgical intervention advised at this time  - Nursing staff to remove indwelling urethral Riojas catheter and perform void trial; patient needs to urinate within 4 to 5 hours after Riojas catheter removal; encourage p.o. fluids; if he has not urinated perform bladder scan; if bladder scan greater than 400 mL reinsert Riojas catheter.  - If successful void trial primary team to resume Brilinta at their discretion  - Concern for recurrence of gross hematuria if Brilinta is resumed please be aware  - Continue empiric antibiotics  - Urology signing off; available for questions or concerns.    D/w Dr. Abhilash Deal.    Supervising MD, Dr. Abhilash Deal  Signed By: ANANTH CREWS - NP - August 31, 2024   
25   GLUCOSE 131* 108* 119* 117*   BUN 19 20 23* 16   CREATININE 1.45* 1.35* 1.54* 1.35*   CALCIUM 9.1 9.3 8.4* 8.7   MG  --   --  1.9  --    BILITOT 1.0  --   --   --    AST 20  --   --   --    ALT 15  --   --   --    INR  --  1.2*  --   --        Signed: Caterina Harper MD

## 2024-09-03 LAB
GLUCOSE BLD STRIP.AUTO-MCNC: 130 MG/DL (ref 65–117)
SERVICE CMNT-IMP: ABNORMAL

## 2024-10-11 ENCOUNTER — FOLLOW UP (OUTPATIENT)
Dept: URBAN - METROPOLITAN AREA CLINIC 21 | Facility: CLINIC | Age: 74
End: 2024-10-11

## 2024-10-11 DIAGNOSIS — H40.1133: ICD-10-CM

## 2024-10-11 DIAGNOSIS — H02.411: ICD-10-CM

## 2024-10-11 PROCEDURE — 99214 OFFICE O/P EST MOD 30 MIN: CPT

## 2024-10-11 PROCEDURE — 92083 EXTENDED VISUAL FIELD XM: CPT

## 2024-10-11 ASSESSMENT — VISUAL ACUITY
OS_CC: 20/30
OD_CC: 20/60-2

## 2024-10-11 ASSESSMENT — TONOMETRY
OS_IOP_MMHG: 16
OD_IOP_MMHG: 17

## 2025-03-26 ENCOUNTER — ESTABLISHED COMPREHENSIVE EXAM (OUTPATIENT)
Dept: URBAN - METROPOLITAN AREA CLINIC 110 | Facility: CLINIC | Age: 75
End: 2025-03-26

## 2025-03-26 ASSESSMENT — VISUAL ACUITY
OS_CC: 20/25-3
OD_CC: 20/40-1
OU_CC: J1+

## 2025-03-26 ASSESSMENT — TONOMETRY
OS_IOP_MMHG: 15
OD_IOP_MMHG: 17